# Patient Record
Sex: MALE | Race: BLACK OR AFRICAN AMERICAN | NOT HISPANIC OR LATINO
[De-identification: names, ages, dates, MRNs, and addresses within clinical notes are randomized per-mention and may not be internally consistent; named-entity substitution may affect disease eponyms.]

---

## 2021-12-15 ENCOUNTER — LABORATORY RESULT (OUTPATIENT)
Age: 71
End: 2021-12-15

## 2021-12-16 ENCOUNTER — MED ADMIN CHARGE (OUTPATIENT)
Age: 71
End: 2021-12-16

## 2021-12-16 ENCOUNTER — NON-APPOINTMENT (OUTPATIENT)
Age: 71
End: 2021-12-16

## 2021-12-16 ENCOUNTER — OUTPATIENT (OUTPATIENT)
Dept: OUTPATIENT SERVICES | Facility: HOSPITAL | Age: 71
LOS: 1 days | End: 2021-12-16
Payer: MEDICARE

## 2021-12-16 ENCOUNTER — APPOINTMENT (OUTPATIENT)
Dept: INTERNAL MEDICINE | Facility: CLINIC | Age: 71
End: 2021-12-16

## 2021-12-16 VITALS
HEIGHT: 71 IN | WEIGHT: 160 LBS | BODY MASS INDEX: 22.4 KG/M2 | OXYGEN SATURATION: 99 % | DIASTOLIC BLOOD PRESSURE: 64 MMHG | HEART RATE: 90 BPM | SYSTOLIC BLOOD PRESSURE: 128 MMHG

## 2021-12-16 DIAGNOSIS — I10 ESSENTIAL (PRIMARY) HYPERTENSION: ICD-10-CM

## 2021-12-16 DIAGNOSIS — R20.2 PARESTHESIA OF SKIN: ICD-10-CM

## 2021-12-16 DIAGNOSIS — Z23 ENCOUNTER FOR IMMUNIZATION: ICD-10-CM

## 2021-12-16 DIAGNOSIS — E11.9 TYPE 2 DIABETES MELLITUS WITHOUT COMPLICATIONS: ICD-10-CM

## 2021-12-16 PROCEDURE — 86617 LYME DISEASE ANTIBODY: CPT

## 2021-12-16 PROCEDURE — 84443 ASSAY THYROID STIM HORMONE: CPT

## 2021-12-16 PROCEDURE — 82746 ASSAY OF FOLIC ACID SERUM: CPT

## 2021-12-16 PROCEDURE — 87389 HIV-1 AG W/HIV-1&-2 AB AG IA: CPT

## 2021-12-16 PROCEDURE — 80061 LIPID PANEL: CPT

## 2021-12-16 PROCEDURE — 86618 LYME DISEASE ANTIBODY: CPT

## 2021-12-16 PROCEDURE — 85027 COMPLETE CBC AUTOMATED: CPT

## 2021-12-16 PROCEDURE — 86780 TREPONEMA PALLIDUM: CPT

## 2021-12-16 PROCEDURE — G0438: CPT | Mod: 25

## 2021-12-16 PROCEDURE — 83036 HEMOGLOBIN GLYCOSYLATED A1C: CPT

## 2021-12-16 PROCEDURE — 90715 TDAP VACCINE 7 YRS/> IM: CPT

## 2021-12-16 PROCEDURE — 90471 IMMUNIZATION ADMIN: CPT

## 2021-12-16 PROCEDURE — 82607 VITAMIN B-12: CPT

## 2021-12-16 PROCEDURE — 80053 COMPREHEN METABOLIC PANEL: CPT

## 2021-12-17 LAB
A1C WITH ESTIMATED AVERAGE GLUCOSE RESULT: 6.7 % — HIGH (ref 4–5.6)
ALBUMIN SERPL ELPH-MCNC: 4.5 G/DL — SIGNIFICANT CHANGE UP (ref 3.3–5)
ALP SERPL-CCNC: 78 U/L — SIGNIFICANT CHANGE UP (ref 40–120)
ALT FLD-CCNC: 12 U/L — SIGNIFICANT CHANGE UP (ref 10–45)
ANION GAP SERPL CALC-SCNC: 15 MMOL/L — SIGNIFICANT CHANGE UP (ref 5–17)
AST SERPL-CCNC: 15 U/L — SIGNIFICANT CHANGE UP (ref 10–40)
B BURGDOR C6 AB SER-ACNC: POSITIVE
B BURGDOR IGG+IGM SER-ACNC: 2.97 INDEX — HIGH (ref 0.01–0.89)
BILIRUB SERPL-MCNC: 1 MG/DL — SIGNIFICANT CHANGE UP (ref 0.2–1.2)
BUN SERPL-MCNC: 13 MG/DL — SIGNIFICANT CHANGE UP (ref 7–23)
CALCIUM SERPL-MCNC: 9.3 MG/DL — SIGNIFICANT CHANGE UP (ref 8.4–10.5)
CHLORIDE SERPL-SCNC: 101 MMOL/L — SIGNIFICANT CHANGE UP (ref 96–108)
CHOLEST SERPL-MCNC: 91 MG/DL — SIGNIFICANT CHANGE UP
CO2 SERPL-SCNC: 23 MMOL/L — SIGNIFICANT CHANGE UP (ref 22–31)
CREAT SERPL-MCNC: 0.88 MG/DL — SIGNIFICANT CHANGE UP (ref 0.5–1.3)
ESTIMATED AVERAGE GLUCOSE: 146 MG/DL — HIGH (ref 68–114)
FOLATE SERPL-MCNC: 3.3 NG/ML — LOW
GLUCOSE SERPL-MCNC: 101 MG/DL — HIGH (ref 70–99)
HCT VFR BLD CALC: 26.7 % — LOW (ref 39–50)
HDLC SERPL-MCNC: 39 MG/DL — LOW
HGB BLD-MCNC: 8 G/DL — LOW (ref 13–17)
HIV 1+2 AB+HIV1 P24 AG SERPL QL IA: SIGNIFICANT CHANGE UP
LIPID PNL WITH DIRECT LDL SERPL: 43 MG/DL — SIGNIFICANT CHANGE UP
LYME IGG AB: 0.13 INDEX — SIGNIFICANT CHANGE UP (ref 0.01–0.89)
LYME IGG INTERP: NEGATIVE — SIGNIFICANT CHANGE UP
LYME IGM AB: 3.59 INDEX — HIGH (ref 0.01–0.89)
LYME IGM INTERP: POSITIVE
MCHC RBC-ENTMCNC: 25.6 PG — LOW (ref 27–34)
MCHC RBC-ENTMCNC: 30 GM/DL — LOW (ref 32–36)
MCV RBC AUTO: 85.6 FL — SIGNIFICANT CHANGE UP (ref 80–100)
NON HDL CHOLESTEROL: 52 MG/DL — SIGNIFICANT CHANGE UP
NRBC # BLD: 7 /100 WBCS — HIGH (ref 0–0)
PLATELET # BLD AUTO: 468 K/UL — HIGH (ref 150–400)
POTASSIUM SERPL-MCNC: 5.4 MMOL/L — HIGH (ref 3.5–5.3)
POTASSIUM SERPL-SCNC: 5.4 MMOL/L — HIGH (ref 3.5–5.3)
PROT SERPL-MCNC: 7.6 G/DL — SIGNIFICANT CHANGE UP (ref 6–8.3)
RBC # BLD: 3.12 M/UL — LOW (ref 4.2–5.8)
RBC # FLD: 33.3 % — HIGH (ref 10.3–14.5)
SODIUM SERPL-SCNC: 139 MMOL/L — SIGNIFICANT CHANGE UP (ref 135–145)
T4 FREE+ TSH PNL SERPL: 1.19 UIU/ML — SIGNIFICANT CHANGE UP (ref 0.27–4.2)
TRIGL SERPL-MCNC: 43 MG/DL — SIGNIFICANT CHANGE UP
VIT B12 SERPL-MCNC: 1229 PG/ML — SIGNIFICANT CHANGE UP (ref 232–1245)
WBC # BLD: 5.7 K/UL — SIGNIFICANT CHANGE UP (ref 3.8–10.5)
WBC # FLD AUTO: 5.7 K/UL — SIGNIFICANT CHANGE UP (ref 3.8–10.5)

## 2021-12-21 LAB — T PALLIDUM AB TITR SER: NEGATIVE — SIGNIFICANT CHANGE UP

## 2021-12-31 NOTE — HISTORY OF PRESENT ILLNESS
[FreeTextEntry1] : CPE/re-establish care [de-identified] : 71M w/ no significant PMH, presenting for CPE/re-establish care after being lost to follow up after 2016. Pt notes he has not changed his PCP but merely did not follow-up, partly due to the COVID-19 pandemic. Today, pt reports that his main concern is that he is "colonized with flies on his head." States it started in Sep 2020 without an inciting event. He reports the flies crawl on his head and "attack it," but he cannot see them because they are "too small" and they also "lay eggs" and "settle in the head." Notes they only come out when it is warm like over 70 degrees F, or depending on what he puts in his mouth. Happens with certain foods, mouthwash, toothpaste. States they come out of his scalp as well as sides of head and can travel down to his legs. Reports that he has tried "everything" to get rid of these flies, including creams, bug sprays, and shaving his head every few weeks. Denies any itching. Denies it is lice or bed bugs. Denies infestation related to his place of residence. Lives in an apartment by himself in a building for senior residents. States he brought the flies with him to [facility] when he traveled there in Sep 2021 (returned in Nov 2021). Pt saw a doctor in [facility] who reportedly provoked his anger because the doctor told him the issue was with his mind, which pt does not believe. Denies f/c/n/v, CP, SOB, cough, abdominal pain, dysuria, hematuria, hematochezia, melena, headache, weakness, numbness, unsteady gait, skin rashes, or sick contacts. Notes L-eye nearsightedness and R-eye farsightedness that is corrected with "special glasses" from his eye doctor.

## 2021-12-31 NOTE — ASSESSMENT
[FreeTextEntry1] : 71M w/ no significant PMH, presenting for CPE/re-establish care after being lost to follow up after 2016, now with concerns that he is "colonized with flies on his head" since Sep 2020.\par \par #Formication\par - pt reports feelings of flies crawling and attacking his skin, mainly colonizing his head for >1 yr\par - unclear underlying etiology, possibly 2/2 metabolic vs psychiatric/neurologic\par - unremarkable physical exam, no signs of Parkinson's or Alzheimer's, no signs suggestive of lice, beg bugs, or other bug bites\par - recent travel to [facility], but symptoms started prior\par - f/u labs to workup for reversible causes: CBC, CMP, TSH, A1c, lipid panel, B12, folate, HIV, Syphilis screen, Lyme serologies\par \par #HCM\par - Depression screening: PHQ-2 score of 0 today\par - Immunizations: flu shot and Tdap today (last Tdap in Jul 2011); already vaccinated for COVID-19 x3 doses\par - Colon cancer screening: last colonoscopy in 3/24/10 showed one 4mm polyp in sigmoid colon but poor prep; due for repeat colonoscopy but pt would like to figure out his problem with the flies first\par \par RTC in 5 weeks for follow-up of symptoms\par d/w Dr. Merlos\par

## 2021-12-31 NOTE — PHYSICAL EXAM
[Normal Sclera/Conjunctiva] : normal sclera/conjunctiva [PERRL] : pupils equal round and reactive to light [EOMI] : extraocular movements intact [No Lymphadenopathy] : no lymphadenopathy [Supple] : supple [Thyroid Normal, No Nodules] : the thyroid was normal and there were no nodules present [Pedal Pulses Present] : the pedal pulses are present [No Edema] : there was no peripheral edema [No Extremity Clubbing/Cyanosis] : no extremity clubbing/cyanosis [Soft] : abdomen soft [Non Tender] : non-tender [Non-distended] : non-distended [Normal Bowel Sounds] : normal bowel sounds [Normal] : no rash [No Focal Deficits] : no focal deficits [Normal Gait] : normal gait [de-identified] : no resting tremor, no bradykinesia [de-identified] : +formication, fixated on being colonized with flies in his head that are too small to be seen

## 2021-12-31 NOTE — HEALTH RISK ASSESSMENT
[Never] : Never [0-4] : 0-4 [No] : No [1 or 2 (0 pts)] : 1 or 2 (0 points) [Never (0 pts)] : Never (0 points) [No falls in past year] : Patient reported no falls in the past year [0] : 2) Feeling down, depressed, or hopeless: Not at all (0) [PHQ-2 Negative - No further assessment needed] : PHQ-2 Negative - No further assessment needed [Alone] : lives alone [Fully functional (bathing, dressing, toileting, transferring, walking, feeding)] : Fully functional (bathing, dressing, toileting, transferring, walking, feeding) [Fully functional (using the telephone, shopping, preparing meals, housekeeping, doing laundry, using] : Fully functional and needs no help or supervision to perform IADLs (using the telephone, shopping, preparing meals, housekeeping, doing laundry, using transportation, managing medications and managing finances) [FreeTextEntry1] : "colonized with flies in the head" [de-identified] : walks 1-2 hours/day [Audit-CScore] : 0 [de-identified] : brown rice, stew, brown bread, fruits especially cantaloupe and honeydew, vegetables, a lot of fish almost everyday [[id number]] : 0 [Reports changes in hearing] : Reports no changes in hearing [ColonoscopyDate] : 03/10 [ColonoscopyComments] : poor prep; one 4mm polyp in sigmoid colon [de-identified] : in senior living residence [de-identified] : vision issues as noted in HPI

## 2021-12-31 NOTE — END OF VISIT
[] : Resident [FreeTextEntry3] : C/O sensation of bugs crawling on his head with normal PE.  Will r/o organic causes but need to consider a psychiatric cause given overall demeanor.

## 2022-01-13 ENCOUNTER — APPOINTMENT (OUTPATIENT)
Dept: INFECTIOUS DISEASE | Facility: CLINIC | Age: 72
End: 2022-01-13
Payer: MEDICARE

## 2022-01-13 VITALS
OXYGEN SATURATION: 99 % | HEIGHT: 71 IN | HEART RATE: 75 BPM | SYSTOLIC BLOOD PRESSURE: 144 MMHG | WEIGHT: 160 LBS | DIASTOLIC BLOOD PRESSURE: 70 MMHG | BODY MASS INDEX: 22.4 KG/M2 | TEMPERATURE: 97.9 F

## 2022-01-13 PROCEDURE — 99202 OFFICE O/P NEW SF 15 MIN: CPT

## 2022-01-14 NOTE — ASSESSMENT
[FreeTextEntry1] : 72 y/o male comes for visit for positive Lyme IgM.\par \par No specific lyme symptoms and no significant risk factors suggestive of Lyme.\par \par No obvious infection of his scalp area.\par \par Hold off abx.\par \par Will check lyme western blot.

## 2022-01-14 NOTE — PHYSICAL EXAM
[General Appearance - Alert] : alert [General Appearance - In No Acute Distress] : in no acute distress [Sclera] : the sclera and conjunctiva were normal [PERRL With Normal Accommodation] : pupils were equal in size, round, reactive to light [Extraocular Movements] : extraocular movements were intact [Outer Ear] : the ears and nose were normal in appearance [Oropharynx] : the oropharynx was normal with no thrush [Neck Appearance] : the appearance of the neck was normal [Neck Cervical Mass (___cm)] : no neck mass was observed [Jugular Venous Distention Increased] : there was no jugular-venous distention [Thyroid Diffuse Enlargement] : the thyroid was not enlarged [Auscultation Breath Sounds / Voice Sounds] : lungs were clear to auscultation bilaterally [Heart Sounds] : normal S1 and S2 [Full Pulse] : the pedal pulses are present [Edema] : there was no peripheral edema [Bowel Sounds] : normal bowel sounds [Abdomen Soft] : soft [Abdomen Tenderness] : non-tender [Abdomen Mass (___ Cm)] : no abdominal mass palpated [Costovertebral Angle Tenderness] : no CVA tenderness [Musculoskeletal - Swelling] : no joint swelling [Skin Color & Pigmentation] : normal skin color and pigmentation [] : no rash [No Focal Deficits] : no focal deficits [Oriented To Time, Place, And Person] : oriented to person, place, and time

## 2022-01-14 NOTE — HISTORY OF PRESENT ILLNESS
[FreeTextEntry1] : 72 y/o male comes for positive lyme serology\par \par His primary complaint is feeling bugs inside his head. No fever or bug bites. Has no pets.\par \par No travel. Walks in the park for exercise. No rash or hunting/coming or trips to [facility] or [facility]. \par \par His workup showed a positive Lyme IgM. Not on abx.\par \par No Cp, sob, nausea, vomiting, diarrhea, abd pain, sore throat. No eye symptoms. No dysuria or hematuria. \par

## 2022-01-20 ENCOUNTER — NON-APPOINTMENT (OUTPATIENT)
Age: 72
End: 2022-01-20

## 2022-01-21 ENCOUNTER — LABORATORY RESULT (OUTPATIENT)
Age: 72
End: 2022-01-21

## 2022-01-21 ENCOUNTER — OUTPATIENT (OUTPATIENT)
Dept: OUTPATIENT SERVICES | Facility: HOSPITAL | Age: 72
LOS: 1 days | End: 2022-01-21
Payer: MEDICARE

## 2022-01-21 ENCOUNTER — APPOINTMENT (OUTPATIENT)
Dept: INTERNAL MEDICINE | Facility: CLINIC | Age: 72
End: 2022-01-21
Payer: COMMERCIAL

## 2022-01-21 VITALS
SYSTOLIC BLOOD PRESSURE: 118 MMHG | BODY MASS INDEX: 22.68 KG/M2 | OXYGEN SATURATION: 98 % | DIASTOLIC BLOOD PRESSURE: 60 MMHG | WEIGHT: 162 LBS | HEART RATE: 71 BPM | HEIGHT: 71 IN

## 2022-01-21 DIAGNOSIS — I10 ESSENTIAL (PRIMARY) HYPERTENSION: ICD-10-CM

## 2022-01-21 DIAGNOSIS — R76.8 OTHER SPECIFIED ABNORMAL IMMUNOLOGICAL FINDINGS IN SERUM: ICD-10-CM

## 2022-01-21 DIAGNOSIS — A69.20 LYME DISEASE, UNSPECIFIED: ICD-10-CM

## 2022-01-21 PROCEDURE — ZZZZZ: CPT

## 2022-01-22 ENCOUNTER — LABORATORY RESULT (OUTPATIENT)
Age: 72
End: 2022-01-22

## 2022-01-22 LAB
ANION GAP SERPL CALC-SCNC: 10 MMOL/L — SIGNIFICANT CHANGE UP (ref 5–17)
BILIRUB SERPL-MCNC: 1 MG/DL — SIGNIFICANT CHANGE UP (ref 0.2–1.2)
BUN SERPL-MCNC: 14 MG/DL — SIGNIFICANT CHANGE UP (ref 7–23)
CALCIUM SERPL-MCNC: 9.1 MG/DL — SIGNIFICANT CHANGE UP (ref 8.4–10.5)
CHLORIDE SERPL-SCNC: 102 MMOL/L — SIGNIFICANT CHANGE UP (ref 96–108)
CO2 SERPL-SCNC: 26 MMOL/L — SIGNIFICANT CHANGE UP (ref 22–31)
CREAT SERPL-MCNC: 0.97 MG/DL — SIGNIFICANT CHANGE UP (ref 0.5–1.3)
FERRITIN SERPL-MCNC: 485 NG/ML — HIGH (ref 30–400)
FOLATE SERPL-MCNC: >20 NG/ML — SIGNIFICANT CHANGE UP
GLUCOSE SERPL-MCNC: 97 MG/DL — SIGNIFICANT CHANGE UP (ref 70–99)
HAPTOGLOB SERPL-MCNC: 74 MG/DL — SIGNIFICANT CHANGE UP (ref 34–200)
HCT VFR BLD CALC: 28.5 % — LOW (ref 39–50)
HGB BLD-MCNC: 8.6 G/DL — LOW (ref 13–17)
IRON SATN MFR SERPL: 32 % — SIGNIFICANT CHANGE UP (ref 16–55)
IRON SATN MFR SERPL: 71 UG/DL — SIGNIFICANT CHANGE UP (ref 45–165)
LDH SERPL L TO P-CCNC: 776 U/L — HIGH (ref 50–242)
MCHC RBC-ENTMCNC: 25.4 PG — LOW (ref 27–34)
MCHC RBC-ENTMCNC: 30.2 GM/DL — LOW (ref 32–36)
MCV RBC AUTO: 84.1 FL — SIGNIFICANT CHANGE UP (ref 80–100)
NRBC # BLD: 6 /100 WBCS — HIGH (ref 0–0)
PLATELET # BLD AUTO: 506 K/UL — HIGH (ref 150–400)
POTASSIUM SERPL-MCNC: 5.7 MMOL/L — HIGH (ref 3.5–5.3)
POTASSIUM SERPL-SCNC: 5.7 MMOL/L — HIGH (ref 3.5–5.3)
RBC # BLD: 3.39 M/UL — LOW (ref 4.2–5.8)
RBC # BLD: 3.39 M/UL — LOW (ref 4.2–5.8)
RBC # FLD: 31.8 % — HIGH (ref 10.3–14.5)
RETICS #: 41.7 K/UL — SIGNIFICANT CHANGE UP (ref 25–125)
RETICS/RBC NFR: 1.3 % — SIGNIFICANT CHANGE UP (ref 0.5–2.5)
SODIUM SERPL-SCNC: 139 MMOL/L — SIGNIFICANT CHANGE UP (ref 135–145)
TIBC SERPL-MCNC: 225 UG/DL — SIGNIFICANT CHANGE UP (ref 220–430)
TRANSFERRIN SERPL-MCNC: 173 MG/DL — LOW (ref 200–360)
UIBC SERPL-MCNC: 154 UG/DL — SIGNIFICANT CHANGE UP (ref 110–370)
WBC # BLD: 6.1 K/UL — SIGNIFICANT CHANGE UP (ref 3.8–10.5)
WBC # FLD AUTO: 6.1 K/UL — SIGNIFICANT CHANGE UP (ref 3.8–10.5)

## 2022-01-22 PROCEDURE — 80048 BASIC METABOLIC PNL TOTAL CA: CPT

## 2022-01-22 PROCEDURE — 82746 ASSAY OF FOLIC ACID SERUM: CPT

## 2022-01-22 PROCEDURE — 84466 ASSAY OF TRANSFERRIN: CPT

## 2022-01-22 PROCEDURE — 83010 ASSAY OF HAPTOGLOBIN QUANT: CPT

## 2022-01-22 PROCEDURE — 85027 COMPLETE CBC AUTOMATED: CPT

## 2022-01-22 PROCEDURE — 86880 COOMBS TEST DIRECT: CPT

## 2022-01-22 PROCEDURE — 83540 ASSAY OF IRON: CPT

## 2022-01-22 PROCEDURE — 82728 ASSAY OF FERRITIN: CPT

## 2022-01-22 PROCEDURE — 85045 AUTOMATED RETICULOCYTE COUNT: CPT

## 2022-01-22 PROCEDURE — 83550 IRON BINDING TEST: CPT

## 2022-01-22 PROCEDURE — G0463: CPT

## 2022-01-22 PROCEDURE — 82247 BILIRUBIN TOTAL: CPT

## 2022-01-22 PROCEDURE — 83615 LACTATE (LD) (LDH) ENZYME: CPT

## 2022-01-24 PROBLEM — R76.8 POSITIVE LYME DISEASE SEROLOGY: Status: ACTIVE | Noted: 2022-01-13

## 2022-01-24 PROBLEM — A69.20 LYME DISEASE: Noted: 2021-12-28

## 2022-01-24 LAB
B BURGDOR AB SER-IMP: NEGATIVE
B BURGDOR IGM PATRN SER IB-IMP: POSITIVE
B BURGDOR18KD IGG SER QL IB: PRESENT
B BURGDOR23KD IGG SER QL IB: NORMAL
B BURGDOR23KD IGM SER QL IB: NORMAL
B BURGDOR28KD IGG SER QL IB: NORMAL
B BURGDOR30KD IGG SER QL IB: NORMAL
B BURGDOR31KD IGG SER QL IB: NORMAL
B BURGDOR39KD IGG SER QL IB: NORMAL
B BURGDOR39KD IGM SER QL IB: PRESENT
B BURGDOR41KD IGG SER QL IB: PRESENT
B BURGDOR41KD IGM SER QL IB: PRESENT
B BURGDOR45KD IGG SER QL IB: NORMAL
B BURGDOR58KD IGG SER QL IB: NORMAL
B BURGDOR66KD IGG SER QL IB: NORMAL
B BURGDOR93KD IGG SER QL IB: NORMAL

## 2022-01-24 NOTE — PHYSICAL EXAM
[Normal TMs] : both tympanic membranes were normal [Normal] : normal rate, regular rhythm, normal S1 and S2 and no murmur heard [Pedal Pulses Present] : the pedal pulses are present [No Edema] : there was no peripheral edema [No Extremity Clubbing/Cyanosis] : no extremity clubbing/cyanosis [Soft] : abdomen soft [Non Tender] : non-tender [Non-distended] : non-distended [Normal Bowel Sounds] : normal bowel sounds [No Rash] : no rash [No Focal Deficits] : no focal deficits [Normal Gait] : normal gait [de-identified] : no resting tremor, no bradykinesia [de-identified] : +formication, fixated on being colonized with flies in his head that are too small to be seen

## 2022-01-24 NOTE — PHYSICAL EXAM
[Normal TMs] : both tympanic membranes were normal [Normal] : normal rate, regular rhythm, normal S1 and S2 and no murmur heard [Pedal Pulses Present] : the pedal pulses are present [No Edema] : there was no peripheral edema [No Extremity Clubbing/Cyanosis] : no extremity clubbing/cyanosis [Soft] : abdomen soft [Non Tender] : non-tender [Non-distended] : non-distended [Normal Bowel Sounds] : normal bowel sounds [No Rash] : no rash [No Focal Deficits] : no focal deficits [Normal Gait] : normal gait [de-identified] : no resting tremor, no bradykinesia [de-identified] : +formication, fixated on being colonized with flies in his head that are too small to be seen

## 2022-01-24 NOTE — REVIEW OF SYSTEMS
[Fever] : no fever [Chills] : no chills [Recent Change In Weight] : ~T no recent weight change [Vision Problems] : no vision problems [Hearing Loss] : no hearing loss [Sore Throat] : no sore throat [Chest Pain] : no chest pain [Palpitations] : no palpitations [Lower Ext Edema] : no lower extremity edema [Shortness Of Breath] : no shortness of breath [Wheezing] : no wheezing [Cough] : no cough [Abdominal Pain] : no abdominal pain [Nausea] : no nausea [Constipation] : no constipation [Diarrhea] : no diarrhea [Vomiting] : no vomiting [Melena] : no melena [Dysuria] : no dysuria [Hematuria] : no hematuria [Joint Pain] : no joint pain [Back Pain] : no back pain [Itching] : no itching [Skin Rash] : no skin rash [Headache] : no headache [Dizziness] : no dizziness [de-identified] : + [de-identified] : +"bugs on skin" sensation (see HPI)

## 2022-01-24 NOTE — ASSESSMENT
[FreeTextEntry1] : 71M w/ previously no significant PMH, but recently diagnosed with DM2, anemia, folate deficiency, positive Lyme IgM serology, presenting for follow-up of his formication symptoms since Sep 2021 (previously explained it started Sep 2020 but today states it started Sep 2021). \par \par #Formication\par - pt reports symptoms of being "colonized with flies in his head" are unchanged despite taking daily folate supplements as directed\par - unclear underlying etiology, possibly 2/2 folate deficiency vs other metabolic etiology vs psychiatric/neurologic vs structural vs malignancy vs unlikely Lyme disease\par - physical exam continues to be unremarkable, no clear signs of Parkinson's or Alzheimer's, no signs suggestive of lice, beg bugs, or other bug bites\par - offered to speak to pt's daughter for collateral information, but pt seemed unwilling and stated she has not been to his apartment since it started\par - pt was previously resistant to idea of primary psych issue, but today was more open to starting psych meds if it turns out to be the case\par - previous workup found negative HIV, TSH, B12, syphilis screen, though positive lyme IgM serology (see below)\par - will obtain further labs (namely repeat folate) today and obtain MRI head if unrevealing\par \par #Positive Lyme IgM serology\par - recently seen by ID (Dr. Kayden Pagan), obtained Lyme western blot which showed IgM positive but IgG negative (despite reports of symptoms for several months)\par - discussed case with Dr. Pagan today shortly, unlikely to be active Lyme infection given no specific symptoms and no significant risk factors\par - suspect results are false positives\par \par #Anemia\par - Last CBC on 12/16/21 found Hgb 8.0, which is significantly lowered than previous baseline of Hgb 11-12 in ~2016\par - MCV in normal range despite folate deficiency, presumably due to mixed macrocytic/microcytic picture with possible iron deficiency\par - remains asymptomatic; denies weight loss, lack of appetite, fatigue, [place], and any bleeding\par - concern for occult blood loss 2/2 possible colon cancer (had 4mm sigmoid colon polyp in the past)\par - will pursue stool FIT test at this time, but also advised to schedule earliest appointment for colonoscopy with GI (referral given and tasked GI fellow to see if able to expedite)\par - f/u repeat CBC, iron panel, hemolysis labs\par \par #Folate deficiency \par - folate level 3.3 on 12/16/21\par - c/w supplemental folic acid 1mg daily\par - f/u repeat folate level today\par \par #DM, recent dx\par - A1c 6.7% on 12/16/21\par - advised to continue lifestyle modifications including improving diet/exercise (pt reports he has increased vegetable intake and reduced carbs/sugary drinks)\par - A1c to be repeated in ~March 2022\par \par #Hyperkalemia, mild\par - Last CMP on 12/16/21 found K 5.4\par - advised to reduce intake of high potassium foods such as bananas\par - f/u repeat BMP\par \par #HCM\par - Depression screening: PHQ-2 score of 0 on 12/16/21\par - Immunizations: vaccinated for flu and Tdap on 12/16/21; already vaccinated for COVID-19 x3 doses (last in Dec 2021)\par - Colon cancer screening: last colonoscopy in 3/24/10 showed one 4mm polyp in sigmoid colon but poor prep; pt amenable to pursuing colonoscopy; GI referral given; FIT stool test also ordered in the meantime given unclear certainty of next available GI appointment\par \par RTC in 10 weeks for f/u of symptoms & DM management, or sooner as needed\par d/w Dr. Beckford\par

## 2022-01-24 NOTE — END OF VISIT
[] : Resident [FreeTextEntry3] : Folate deficiency (?etiology, B12 levels quite adequate) can result in neuropsychiatric disturbance, ?basis of the formication.  Has no EtOH hx, syphilis screen negative.  Has no other metabolic contributor found so far; does have a normal MCV anemia in the face of the folate deficiency, which is down trending.  Needs Fe indices, needs endoscopies upper and lower to work this up.  Not symptomatic enough for transfusion.  GI referral for asap scopes made.

## 2022-01-24 NOTE — HISTORY OF PRESENT ILLNESS
[FreeTextEntry1] : follow-up for formication [de-identified] : 71M w/ previously no significant PMH, but recently diagnosed with DM2, anemia, folate deficiency, positive Lyme IgM serology, presenting for follow-up of his formication symptoms since Sep 2021 (previously explained it started Sep 2020 but today states it started Sep 2021). \par \par #Formication\par - Pt continues to report being "colonized with flies on his head" and symptoms have not changed despite taking Folate supplements daily as directed. Continues to mainly be feelings of crawling, hitting, or stinging of his head, and at times moving to his legs. He still cannot see them. States no one else in his building deals with this issue, no one else has seen any flies or other bugs, and  that he hired couldn't find anything either. Last visit stated this has not happened before, but this time he notes that something similar (but still different) happened when he took some sort of medication in [facility] as a child to help study. Those symptoms stopped after taking the medication and he hasn't had anything similar until these symptoms. Continues to deny any smoking, drinking of EtOH, or use of any other drugs or medications aside from the prescribed folate. Last visit pt was resistant to the idea of a possible primary psych issue, but upon further discussion today, pt noted that he is open to the idea of psych issue, in which case he would be amenable to starting a medication since he just wants help with these symptoms.\par \par #Positive Lyme IgM serology\par - Saw ID (Dr. Kayden Pagan) since last visit for positive Lyme IgM serology. Obtained Lyme western blot which showed IgM positive but IgG negative. Discussed case with Dr. Pagan shortly prior to today's visit, unlikely to be active Lyme infection given no specific symptoms and no significant risk factors.\par \par #Anemia\par - Notes he has had a hx of anemia in the past but not as severe. Denies weight loss, lack of appetite, fatigue, or [place]. Denies f/c/n/v, CP, SOB, abdominal pain, dysuria, hematuria, hematochezia, melena, diarrhea, constipation. Of note, pt reports not eating red meat since ~1990. Still eats fish and chicken.\par \par #DM2\par - Since revealing pt he has DM, pt reports he has made changes to his diet to eat more vegetables and reduce intake of carbohydrates and sugary drinks.\par \par #HCM\par - initially, pt continued to stated he wanted to figure out this issue first prior to pursuing a colonoscopy. However, upon further discussion, pt was agreeable to schedule appointment with GI for colonoscopy. Pt also willing to do FIT stool test in the meantime given unclear certainty of next available GI appointment.\par

## 2022-01-24 NOTE — REVIEW OF SYSTEMS
[Fever] : no fever [Chills] : no chills [Recent Change In Weight] : ~T no recent weight change [Vision Problems] : no vision problems [Hearing Loss] : no hearing loss [Sore Throat] : no sore throat [Chest Pain] : no chest pain [Palpitations] : no palpitations [Lower Ext Edema] : no lower extremity edema [Shortness Of Breath] : no shortness of breath [Wheezing] : no wheezing [Cough] : no cough [Abdominal Pain] : no abdominal pain [Nausea] : no nausea [Constipation] : no constipation [Diarrhea] : no diarrhea [Vomiting] : no vomiting [Melena] : no melena [Dysuria] : no dysuria [Hematuria] : no hematuria [Joint Pain] : no joint pain [Back Pain] : no back pain [Itching] : no itching [Skin Rash] : no skin rash [Headache] : no headache [Dizziness] : no dizziness [de-identified] : + [de-identified] : +"bugs on skin" sensation (see HPI)

## 2022-01-24 NOTE — HISTORY OF PRESENT ILLNESS
[FreeTextEntry1] : follow-up for formication [de-identified] : 71M w/ previously no significant PMH, but recently diagnosed with DM2, anemia, folate deficiency, positive Lyme IgM serology, presenting for follow-up of his formication symptoms since Sep 2021 (previously explained it started Sep 2020 but today states it started Sep 2021). \par \par #Formication\par - Pt continues to report being "colonized with flies on his head" and symptoms have not changed despite taking Folate supplements daily as directed. Continues to mainly be feelings of crawling, hitting, or stinging of his head, and at times moving to his legs. He still cannot see them. States no one else in his building deals with this issue, no one else has seen any flies or other bugs, and  that he hired couldn't find anything either. Last visit stated this has not happened before, but this time he notes that something similar (but still different) happened when he took some sort of medication in [facility] as a child to help study. Those symptoms stopped after taking the medication and he hasn't had anything similar until these symptoms. Continues to deny any smoking, drinking of EtOH, or use of any other drugs or medications aside from the prescribed folate. Last visit pt was resistant to the idea of a possible primary psych issue, but upon further discussion today, pt noted that he is open to the idea of psych issue, in which case he would be amenable to starting a medication since he just wants help with these symptoms.\par \par #Positive Lyme IgM serology\par - Saw ID (Dr. Kayden Pagan) since last visit for positive Lyme IgM serology. Obtained Lyme western blot which showed IgM positive but IgG negative. Discussed case with Dr. Pagan shortly prior to today's visit, unlikely to be active Lyme infection given no specific symptoms and no significant risk factors.\par \par #Anemia\par - Notes he has had a hx of anemia in the past but not as severe. Denies weight loss, lack of appetite, fatigue, or [place]. Denies f/c/n/v, CP, SOB, abdominal pain, dysuria, hematuria, hematochezia, melena, diarrhea, constipation. Of note, pt reports not eating red meat since ~1990. Still eats fish and chicken.\par \par #DM2\par - Since revealing pt he has DM, pt reports he has made changes to his diet to eat more vegetables and reduce intake of carbohydrates and sugary drinks.\par \par #HCM\par - initially, pt continued to stated he wanted to figure out this issue first prior to pursuing a colonoscopy. However, upon further discussion, pt was agreeable to schedule appointment with GI for colonoscopy. Pt also willing to do FIT stool test in the meantime given unclear certainty of next available GI appointment.\par

## 2022-01-28 DIAGNOSIS — R20.2 PARESTHESIA OF SKIN: ICD-10-CM

## 2022-01-28 DIAGNOSIS — E11.9 TYPE 2 DIABETES MELLITUS WITHOUT COMPLICATIONS: ICD-10-CM

## 2022-01-28 DIAGNOSIS — E87.5 HYPERKALEMIA: ICD-10-CM

## 2022-01-28 DIAGNOSIS — R76.8 OTHER SPECIFIED ABNORMAL IMMUNOLOGICAL FINDINGS IN SERUM: ICD-10-CM

## 2022-01-28 DIAGNOSIS — R71.8 OTHER ABNORMALITY OF RED BLOOD CELLS: ICD-10-CM

## 2022-01-28 DIAGNOSIS — D64.9 ANEMIA, UNSPECIFIED: ICD-10-CM

## 2022-01-28 DIAGNOSIS — E53.8 DEFICIENCY OF OTHER SPECIFIED B GROUP VITAMINS: ICD-10-CM

## 2022-02-03 ENCOUNTER — OUTPATIENT (OUTPATIENT)
Dept: OUTPATIENT SERVICES | Facility: HOSPITAL | Age: 72
LOS: 1 days | Discharge: HOME | End: 2022-02-03

## 2022-02-03 DIAGNOSIS — D69.6 THROMBOCYTOPENIA, UNSPECIFIED: ICD-10-CM

## 2022-02-04 ENCOUNTER — RESULT REVIEW (OUTPATIENT)
Age: 72
End: 2022-02-04

## 2022-02-04 ENCOUNTER — APPOINTMENT (OUTPATIENT)
Dept: HEMATOLOGY ONCOLOGY | Facility: CLINIC | Age: 72
End: 2022-02-04
Payer: MEDICARE

## 2022-02-04 VITALS
WEIGHT: 165.35 LBS | OXYGEN SATURATION: 94 % | BODY MASS INDEX: 24.49 KG/M2 | HEIGHT: 69 IN | RESPIRATION RATE: 15 BRPM | TEMPERATURE: 97.9 F | DIASTOLIC BLOOD PRESSURE: 68 MMHG | SYSTOLIC BLOOD PRESSURE: 115 MMHG | HEART RATE: 76 BPM

## 2022-02-04 LAB
ANISOCYTOSIS BLD QL: SLIGHT — SIGNIFICANT CHANGE UP
BASOPHILS # BLD AUTO: 0 K/UL — SIGNIFICANT CHANGE UP (ref 0–0.2)
BASOPHILS NFR BLD AUTO: 0 % — SIGNIFICANT CHANGE UP (ref 0–2)
DACRYOCYTES BLD QL SMEAR: SLIGHT — SIGNIFICANT CHANGE UP
EOSINOPHIL # BLD AUTO: 0.05 K/UL — SIGNIFICANT CHANGE UP (ref 0–0.5)
EOSINOPHIL NFR BLD AUTO: 1 % — SIGNIFICANT CHANGE UP (ref 0–6)
HCT VFR BLD CALC: 28.2 % — LOW (ref 39–50)
HGB BLD-MCNC: 8.7 G/DL — LOW (ref 13–17)
LYMPHOCYTES # BLD AUTO: 0.66 K/UL — LOW (ref 1–3.3)
LYMPHOCYTES # BLD AUTO: 13 % — SIGNIFICANT CHANGE UP (ref 13–44)
MCHC RBC-ENTMCNC: 26.8 PG — LOW (ref 27–34)
MCHC RBC-ENTMCNC: 30.9 G/DL — LOW (ref 32–36)
MCV RBC AUTO: 86.8 FL — SIGNIFICANT CHANGE UP (ref 80–100)
METAMYELOCYTES # FLD: 3 % — HIGH (ref 0–0)
MICROCYTES BLD QL: SLIGHT — SIGNIFICANT CHANGE UP
MONOCYTES # BLD AUTO: 0.2 K/UL — SIGNIFICANT CHANGE UP (ref 0–0.9)
MONOCYTES NFR BLD AUTO: 4 % — SIGNIFICANT CHANGE UP (ref 2–14)
MYELOCYTES NFR BLD: 1 % — HIGH (ref 0–0)
NEUTROPHILS # BLD AUTO: 3.93 K/UL — SIGNIFICANT CHANGE UP (ref 1.8–7.4)
NEUTROPHILS NFR BLD AUTO: 78 % — HIGH (ref 43–77)
NRBC # BLD: 10 /100 — HIGH (ref 0–0)
NRBC # BLD: SIGNIFICANT CHANGE UP /100 WBCS (ref 0–0)
PLAT MORPH BLD: NORMAL — SIGNIFICANT CHANGE UP
PLATELET # BLD AUTO: 589 K/UL — HIGH (ref 150–400)
POIKILOCYTOSIS BLD QL AUTO: SLIGHT — SIGNIFICANT CHANGE UP
RBC # BLD: 3.25 M/UL — LOW (ref 4.2–5.8)
RBC # FLD: 31.4 % — HIGH (ref 10.3–14.5)
RBC BLD AUTO: ABNORMAL
RETICS #: 43.2 K/UL — SIGNIFICANT CHANGE UP (ref 25–125)
RETICS/RBC NFR: 1.3 % — SIGNIFICANT CHANGE UP (ref 0.5–2.5)
SCHISTOCYTES BLD QL AUTO: SLIGHT — SIGNIFICANT CHANGE UP
WBC # BLD: 5.04 K/UL — SIGNIFICANT CHANGE UP (ref 3.8–10.5)
WBC # FLD AUTO: 5.04 K/UL — SIGNIFICANT CHANGE UP (ref 3.8–10.5)

## 2022-02-04 PROCEDURE — 99205 OFFICE O/P NEW HI 60 MIN: CPT

## 2022-02-04 NOTE — CONSULT LETTER
[Dear  ___] : Dear  [unfilled], [Consult Letter:] : I had the pleasure of evaluating your patient, [unfilled]. [Please see my note below.] : Please see my note below. [Consult Closing:] : Thank you very much for allowing me to participate in the care of this patient.  If you have any questions, please do not hesitate to contact me. [Sincerely,] : Sincerely, [FreeTextEntry3] : Jose Mayers MD, PhD, MS \par Attending Physician \par Hematology-Oncology \par [facility]\par

## 2022-02-04 NOTE — ASSESSMENT
[FreeTextEntry1] : 71 year-old Mr. BAIN is seen in consult for anemia, thrombocytosis and rule out myelofibrosis. Patient is completely asymptomatic.His anemia is normocytic, he has had low folic acid that has normalized after folate supplementation. Noted to have mild high ferritin and moderately high LDH in the absence evidence of hemolysis. He does not have splenomegaly on physical exam. Intramedullary may have happened due to folate deficiency, however a primary bone marrow process cannot be ruled out. Will repeat some of the labs and plan on a bone marrow biopsy. Of note patient has some kind of hallucination/delusion and may benefit from a psych evaluation. \par \par # Normocytic Anemia (8.6 g) of unclear etiology \par - Patient is asymptomatic.\par - Repeat CBC, LDH, Folate\par - Send TSH, Copper, Zinc, EPO level \par - Cannot rule out prefibrotic PMF\par - Will consider a bone marrow biopsy after lab review \par - Continue Folic acid \par - RTC in 1 month\par  \par

## 2022-02-04 NOTE — HISTORY OF PRESENT ILLNESS
[de-identified] : 71 year-old Mr. BAIN is seen in consult for "anemia, thrombocytosis, r/o myelofibrosis". Patient states that since Sept 2021 he has been feeling like flies crawling inside his head when he is smelling food or eating; then the flies crawl to his legs. He went to his PCP for this symptom where after a lot of blood work he was found to be anemic, folate deficient. He has been on folic acid since mid January. Lab work also showed thrombocytosis (506). His Hgb was 8.6, MCV normal range, no  retic response, normal iron studies, high ferritin (485), normal  B12. Of note LDH was high at ~775, haptoglobin was normal range (~75). The patient denies any fatigue, weight loss, night sweats, fever. He has no complaints other than the "flies" which have long been abundant in his apartment and now they are crawling in his brain. Of note, patient has no other medical condition and he exercises regularly.

## 2022-02-04 NOTE — RESULTS/DATA
Anesthesia Transfer of Care Note    Patient: Naila Huerta    Procedure(s) Performed: Procedure(s) (LRB):  ARTHROSCOPY, KNEE (Left)  MENISCECTOMY, KNEE, MEDIAL AND LATERAL (Left)    Patient location: PACU    Anesthesia Type: general    Transport from OR: Transported from OR on room air with adequate spontaneous ventilation    Post pain: adequate analgesia    Post assessment: no apparent anesthetic complications    Post vital signs: stable    Level of consciousness: awake and alert    Nausea/Vomiting: no nausea/vomiting    Complications: none    Transfer of care protocol was followed      Last vitals:   Visit Vitals  /79 (BP Location: Right arm, Patient Position: Lying)   Pulse 67   Temp 36.5 °C (97.7 °F) (Oral)   Resp 16   Wt 65.8 kg (145 lb 1 oz)   SpO2 97%   BMI 22.72 kg/m²      [FreeTextEntry1] : 2/4/2022 \par Available labs reviewed, relevant data as below:\par Anemia, Hgb 8.6, normal MCV (84); no retic response,\par Normal iron studies, high ferritin (485), \par Low Folic acid  (3.3), normal  B12\par Thrombocytosis (506).  \par LDH high at ~750 \par Haptoglobin normal range.

## 2022-02-04 NOTE — REASON FOR VISIT
[Initial Consultation] : an initial consultation for [FreeTextEntry2] : Anemia, thrombocytosis, r/o myelofibrosis

## 2022-02-07 LAB
ALBUMIN SERPL ELPH-MCNC: 4.7 G/DL
ALP BLD-CCNC: 87 U/L
ALT SERPL-CCNC: 27 U/L
ANION GAP SERPL CALC-SCNC: 8 MMOL/L
AST SERPL-CCNC: 21 U/L
B19V IGG SER QL IA: 1.76 INDEX
B19V IGG+IGM SER-IMP: NORMAL
B19V IGG+IGM SER-IMP: POSITIVE
B19V IGM FLD-ACNC: 0.16 INDEX
B19V IGM SER-ACNC: NEGATIVE
BILIRUB SERPL-MCNC: 0.9 MG/DL
BUN SERPL-MCNC: 18 MG/DL
CALCIUM SERPL-MCNC: 8.9 MG/DL
CHLORIDE SERPL-SCNC: 106 MMOL/L
CO2 SERPL-SCNC: 26 MMOL/L
COPPER SERPL-MCNC: 121 UG/DL
CREAT SERPL-MCNC: 0.97 MG/DL
EPO SERPL-MCNC: 39 MIU/ML
FOLATE SERPL-MCNC: >20 NG/ML
GLUCOSE SERPL-MCNC: 189 MG/DL
LDH SERPL-CCNC: 739 U/L
POTASSIUM SERPL-SCNC: 4.8 MMOL/L
PROT SERPL-MCNC: 6.8 G/DL
SODIUM SERPL-SCNC: 140 MMOL/L
TSH SERPL-ACNC: 1.72 UIU/ML

## 2022-02-09 LAB
ANA PAT FLD IF-IMP: ABNORMAL
ANA SER IF-ACNC: ABNORMAL
ZINC SERPL-MCNC: 55 UG/DL

## 2022-02-25 ENCOUNTER — APPOINTMENT (OUTPATIENT)
Dept: RHEUMATOLOGY | Facility: CLINIC | Age: 72
End: 2022-02-25

## 2022-02-28 ENCOUNTER — APPOINTMENT (OUTPATIENT)
Dept: RHEUMATOLOGY | Facility: CLINIC | Age: 72
End: 2022-02-28
Payer: MEDICARE

## 2022-02-28 ENCOUNTER — LABORATORY RESULT (OUTPATIENT)
Age: 72
End: 2022-02-28

## 2022-02-28 VITALS
BODY MASS INDEX: 23.19 KG/M2 | HEART RATE: 88 BPM | HEIGHT: 70 IN | SYSTOLIC BLOOD PRESSURE: 137 MMHG | RESPIRATION RATE: 16 BRPM | TEMPERATURE: 97.2 F | OXYGEN SATURATION: 96 % | DIASTOLIC BLOOD PRESSURE: 73 MMHG | WEIGHT: 162 LBS

## 2022-02-28 DIAGNOSIS — R76.8 OTHER SPECIFIED ABNORMAL IMMUNOLOGICAL FINDINGS IN SERUM: ICD-10-CM

## 2022-02-28 PROCEDURE — 99204 OFFICE O/P NEW MOD 45 MIN: CPT

## 2022-03-04 LAB
ALBUMIN MFR SERPL ELPH: 53.8 %
ALBUMIN SERPL-MCNC: 3.8 G/DL
ALBUMIN/GLOB SERPL: 1.2 RATIO
ALPHA1 GLOB MFR SERPL ELPH: 5.1 %
ALPHA1 GLOB SERPL ELPH-MCNC: 0.4 G/DL
ALPHA2 GLOB MFR SERPL ELPH: 8.5 %
ALPHA2 GLOB SERPL ELPH-MCNC: 0.6 G/DL
ANA PAT FLD IF-IMP: ABNORMAL
ANA SER IF-ACNC: ABNORMAL
APPEARANCE: CLEAR
B-GLOBULIN MFR SERPL ELPH: 9.4 %
B-GLOBULIN SERPL ELPH-MCNC: 0.7 G/DL
B2 GLYCOPROT1 IGA SERPL IA-ACNC: 5.3 SAU
B2 GLYCOPROT1 IGG SER-ACNC: <5 SGU
B2 GLYCOPROT1 IGM SER-ACNC: 10.1 SMU
BILIRUBIN URINE: NEGATIVE
BLOOD URINE: NEGATIVE
C3 SERPL-MCNC: 83 MG/DL
C4 SERPL-MCNC: 28 MG/DL
CARDIOLIPIN IGM SER-MCNC: 44.6 MPL
CARDIOLIPIN IGM SER-MCNC: 7.4 GPL
CCP AB SER IA-ACNC: 15 UNITS
CENTROMERE IGG SER-ACNC: <0.2 CD:130001892
CK SERPL-CCNC: 68 U/L
COLOR: YELLOW
CONFIRM: 35.7 SEC
CREAT SPEC-SCNC: 102 MG/DL
CREAT/PROT UR: 0.2 RATIO
DEPRECATED CARDIOLIPIN IGA SER: <5 APL
DEPRECATED KAPPA LC FREE/LAMBDA SER: 1.08 RATIO
DRVVT IMM 1:2 NP PPP: NORMAL
DRVVT SCREEN TO CONFIRM RATIO: 0.92 RATIO
DSDNA AB SER-ACNC: <12 IU/ML
ENA RNP AB SER IA-ACNC: 0.2 AL
ENA SCL70 IGG SER IA-ACNC: <0.2 AL
ENA SM AB SER IA-ACNC: <0.2 AL
ENA SS-A AB SER IA-ACNC: <0.2 AL
ENA SS-B AB SER IA-ACNC: <0.2 AL
GAMMA GLOB FLD ELPH-MCNC: 1.6 G/DL
GAMMA GLOB MFR SERPL ELPH: 23.2 %
GLUCOSE QUALITATIVE U: NEGATIVE
IGA SER QL IEP: 122 MG/DL
IGG SER QL IEP: 1550 MG/DL
IGM SER QL IEP: 319 MG/DL
IL6 SERPL-MCNC: 5.8 PG/ML
INTERPRETATION SERPL IEP-IMP: NORMAL
KAPPA LC CSF-MCNC: 3.99 MG/DL
KAPPA LC SERPL-MCNC: 4.29 MG/DL
KETONES URINE: NEGATIVE
LEUKOCYTE ESTERASE URINE: NEGATIVE
M PROTEIN SPEC IFE-MCNC: NORMAL
MYELOPEROXIDASE AB SER QL IA: <5 UNITS
MYELOPEROXIDASE CELLS FLD QL: NEGATIVE
NITRITE URINE: NEGATIVE
PH URINE: 6
PROT SERPL-MCNC: 7 G/DL
PROT SERPL-MCNC: 7 G/DL
PROT UR-MCNC: 25 MG/DL
PROTEIN URINE: ABNORMAL
PROTEINASE3 AB SER IA-ACNC: 7 UNITS
PROTEINASE3 AB SER-ACNC: NEGATIVE
RF+CCP IGG SER-IMP: NEGATIVE
RHEUMATOID FACT SER QL: 14 IU/ML
SCREEN DRVVT: 44 SEC
SPECIFIC GRAVITY URINE: 1.02
THYROGLOB AB SERPL-ACNC: <20 IU/ML
THYROPEROXIDASE AB SERPL IA-ACNC: <10 IU/ML
UROBILINOGEN URINE: ABNORMAL

## 2022-03-08 LAB
IGG SUBSET TOTAL IGG: 1604 MG/DL
IGG1 SER-MCNC: 988 MG/DL
IGG2 SER-MCNC: 405 MG/DL
IGG3 SER-MCNC: >220 MG/DL
IGG4 SER-MCNC: 62 MG/DL

## 2022-03-17 ENCOUNTER — OUTPATIENT (OUTPATIENT)
Dept: OUTPATIENT SERVICES | Facility: HOSPITAL | Age: 72
LOS: 1 days | Discharge: HOME | End: 2022-03-17

## 2022-03-17 DIAGNOSIS — D69.6 THROMBOCYTOPENIA, UNSPECIFIED: ICD-10-CM

## 2022-03-21 ENCOUNTER — APPOINTMENT (OUTPATIENT)
Dept: HEMATOLOGY ONCOLOGY | Facility: CLINIC | Age: 72
End: 2022-03-21

## 2022-05-04 ENCOUNTER — OUTPATIENT (OUTPATIENT)
Dept: OUTPATIENT SERVICES | Facility: HOSPITAL | Age: 72
LOS: 1 days | End: 2022-05-04
Payer: MEDICARE

## 2022-05-04 ENCOUNTER — APPOINTMENT (OUTPATIENT)
Dept: INTERNAL MEDICINE | Facility: CLINIC | Age: 72
End: 2022-05-04
Payer: MEDICARE

## 2022-05-04 ENCOUNTER — LABORATORY RESULT (OUTPATIENT)
Age: 72
End: 2022-05-04

## 2022-05-04 VITALS
WEIGHT: 163 LBS | HEIGHT: 70 IN | BODY MASS INDEX: 23.34 KG/M2 | HEART RATE: 98 BPM | SYSTOLIC BLOOD PRESSURE: 126 MMHG | OXYGEN SATURATION: 98 % | DIASTOLIC BLOOD PRESSURE: 78 MMHG

## 2022-05-04 DIAGNOSIS — E53.8 DEFICIENCY OF OTHER SPECIFIED B GROUP VITAMINS: ICD-10-CM

## 2022-05-04 DIAGNOSIS — I10 ESSENTIAL (PRIMARY) HYPERTENSION: ICD-10-CM

## 2022-05-04 DIAGNOSIS — Z86.39 PERSONAL HISTORY OF OTHER ENDOCRINE, NUTRITIONAL AND METABOLIC DISEASE: ICD-10-CM

## 2022-05-04 PROCEDURE — G0463: CPT | Mod: 25

## 2022-05-04 PROCEDURE — 85025 COMPLETE CBC W/AUTO DIFF WBC: CPT

## 2022-05-04 PROCEDURE — G0009: CPT

## 2022-05-04 PROCEDURE — 90670 PCV13 VACCINE IM: CPT

## 2022-05-04 PROCEDURE — 99213 OFFICE O/P EST LOW 20 MIN: CPT | Mod: GE

## 2022-05-04 PROCEDURE — 83036 HEMOGLOBIN GLYCOSYLATED A1C: CPT

## 2022-05-06 ENCOUNTER — NON-APPOINTMENT (OUTPATIENT)
Age: 72
End: 2022-05-06

## 2022-05-06 LAB
BASOPHILS # BLD AUTO: 0.34 K/UL
BASOPHILS NFR BLD AUTO: 5.3 %
EOSINOPHIL # BLD AUTO: 0 K/UL
EOSINOPHIL NFR BLD AUTO: 0 %
HBA1C MFR BLD HPLC: 7.1
HCT VFR BLD CALC: 29.2 %
HGB BLD-MCNC: 8.7 G/DL
LYMPHOCYTES # BLD AUTO: 1.14 K/UL
LYMPHOCYTES NFR BLD AUTO: 17.5 %
MAN DIFF?: NORMAL
MCHC RBC-ENTMCNC: 26.2 PG
MCHC RBC-ENTMCNC: 29.8 GM/DL
MCV RBC AUTO: 88 FL
MONOCYTES # BLD AUTO: 0.17 K/UL
MONOCYTES NFR BLD AUTO: 2.6 %
NEUTROPHILS # BLD AUTO: 4.27 K/UL
NEUTROPHILS NFR BLD AUTO: 59.6 %
PLATELET # BLD AUTO: 425 K/UL
RBC # BLD: 3.32 M/UL
RBC # FLD: 30.8 %
WBC # FLD AUTO: 6.5 K/UL

## 2022-05-09 PROBLEM — E53.8 FOLATE DEFICIENCY: Status: ACTIVE | Noted: 2021-12-17

## 2022-05-09 PROBLEM — Z86.39 HISTORY OF HYPERKALEMIA: Status: RESOLVED | Noted: 2022-01-24 | Resolved: 2022-05-09

## 2022-05-09 NOTE — REVIEW OF SYSTEMS
[Fever] : no fever [Chills] : no chills [Recent Change In Weight] : ~T no recent weight change [Vision Problems] : no vision problems [Hearing Loss] : no hearing loss [Sore Throat] : no sore throat [Chest Pain] : no chest pain [Palpitations] : no palpitations [Lower Ext Edema] : no lower extremity edema [Shortness Of Breath] : no shortness of breath [Wheezing] : no wheezing [Cough] : no cough [Abdominal Pain] : no abdominal pain [Nausea] : no nausea [Constipation] : no constipation [Diarrhea] : no diarrhea [Vomiting] : no vomiting [Melena] : no melena [Dysuria] : no dysuria [Hematuria] : no hematuria [Joint Pain] : no joint pain [Back Pain] : no back pain [Itching] : no itching [Skin Rash] : no skin rash [Headache] : no headache [Dizziness] : no dizziness [de-identified] : +"bugs on skin" sensation (improved compared to prior)

## 2022-05-09 NOTE — HISTORY OF PRESENT ILLNESS
[FreeTextEntry1] : follow-up [de-identified] : 71M w/ hx of recently diagnosed DM2, normocytic anemia of unclear etiology, folate deficiency, presenting for follow-up of his formication symptoms since Sep 2021. Today, pt reports that although he is still having the same symptoms of "flies on his head," he is feeling better. States it is "10% of what it was" and also notes that he has been feeling more energy.\par \par #Formication\par - Still taking folic acid as directed. Since last visit, has been seen by Hematology and Rheumatology. Per Heme, cannot r/o prefibrotic primary myelofibrosis; will consider bone marrow biopsy. Per Rheum, doubt autoimmune condition and notes he will likely need brain imaging to address his concerns. Of note, pt states he will be flying to [facility] in 3 days and will be staying there for 3 months. Requesting larger supply of folic acid.\par \par #DM2\par - POCT A1c 7.1% today (was previously 6.7% in Dec 2021)\par - Reports exercising regularly and does not believe he has changed his diet much; notes he still has has brown rice, bread, and fruits, but has reduced the amount\par \par #HCM\par - already vaccinated for flu and Tdap in Dec 2021; also vaccinated for COVID-19 x3 doses (last in Dec 2021)\par - amenable to receiving [id number] vaccine today\par - GI referral previously given for colonoscopy but pt has not scheduled appointment; FIT test was negative in Jan 2022

## 2022-05-09 NOTE — ASSESSMENT
[FreeTextEntry1] : 71M w/ hx of recently diagnosed DM2, normocytic anemia of unclear etiology, folate deficiency, presenting for follow-up of his formication symptoms since Sep 2021. \par \par #Formication\par - reports symptoms of being "colonized with flies in his head" have significantly improved since last visit in Jan 2022; now only 10% of what it was\par - unclear underlying etiology, possibly 2/2 folate deficiency vs other metabolic etiology vs psychiatric/neurologic vs structural vs malignancy vs unlikely Lyme disease\par - physical exam continues to be unremarkable, no clear signs of Parkinson's or Alzheimer's, no signs suggestive of lice, beg bugs, or other bug bites\par - s/p extensive workup without convincing etiology thus far\par - will obtain CTH non con for now; may consider MRI head if needed\par - following with Heme and Rheum\par - c/w supplemental folic acid 1mg daily\par \par #Anemia, normocytic\par - possibly 2/2 primary heme etiology\par - less likely occult bleed given recent FIT test in Jan 2022 was negative\par - Last CBC in Feb 2022 was 8.7, appears to be stable but still significantly lower than previous baseline of Hgb 11-12 in ~2016\par - remains asymptomatic; denies weight loss, lack of appetite, fatigue, [place], and any bleeding\par - f/u Hematology, may consider BM biopsy\par - f/u repeat CBC today\par \par #Folate deficiency \par - folate level 3.3 in Dec 2021 but has since been normal x2 after folate supplementation\par - c/w supplemental folic acid 1mg daily\par \par #DM2\par - POCT A1c 7.1% today; last A1c 6.7% on 12/16/21\par - unclear accuracy of A1c given anemia\par - advised to continue lifestyle modifications for now including improving diet/exercise (pt reports he has increased vegetable intake and reduced carbs/sugary drinks)\par \par #HCM\par - Depression screening: PHQ-2 score of 0 today\par - Immunizations: received [id number] vaccine today; already vaccinated for flu and Tdap on 12/16/21; already vaccinated for COVID-19 x3 doses (last in Dec 2021)\par - Colon cancer screening: FIT stool test negative on 1/22/22; last colonoscopy in 3/24/10 showed one 4mm polyp in sigmoid colon but poor prep; GI referral was previously given as pt was amenable for repeat colonoscopy\par \par RTC in 3 months (or whenever pt returns after 3-month trip to [facility]) for f/u of symptoms and DM management\par d/w Dr. Hernandez\par \par

## 2022-05-09 NOTE — PHYSICAL EXAM
[Normal] : normal rate, regular rhythm, normal S1 and S2 and no murmur heard [Pedal Pulses Present] : the pedal pulses are present [No Edema] : there was no peripheral edema [No Extremity Clubbing/Cyanosis] : no extremity clubbing/cyanosis [Soft] : abdomen soft [Non Tender] : non-tender [Non-distended] : non-distended [Normal Bowel Sounds] : normal bowel sounds [No Rash] : no rash [No Focal Deficits] : no focal deficits [Normal Gait] : normal gait [de-identified] : no resting tremor, no bradykinesia [de-identified] : +formication (sensations have largely improved though still believes colonized with flies on head)

## 2022-05-10 DIAGNOSIS — D64.9 ANEMIA, UNSPECIFIED: ICD-10-CM

## 2022-05-10 DIAGNOSIS — E53.8 DEFICIENCY OF OTHER SPECIFIED B GROUP VITAMINS: ICD-10-CM

## 2022-05-10 DIAGNOSIS — Z23 ENCOUNTER FOR IMMUNIZATION: ICD-10-CM

## 2022-05-10 DIAGNOSIS — E11.9 TYPE 2 DIABETES MELLITUS WITHOUT COMPLICATIONS: ICD-10-CM

## 2022-05-10 DIAGNOSIS — R20.2 PARESTHESIA OF SKIN: ICD-10-CM

## 2022-08-24 ENCOUNTER — OUTPATIENT (OUTPATIENT)
Dept: OUTPATIENT SERVICES | Facility: HOSPITAL | Age: 72
LOS: 1 days | End: 2022-08-24
Payer: MEDICARE

## 2022-08-24 ENCOUNTER — APPOINTMENT (OUTPATIENT)
Dept: CT IMAGING | Facility: CLINIC | Age: 72
End: 2022-08-24

## 2022-08-24 DIAGNOSIS — R20.2 PARESTHESIA OF SKIN: ICD-10-CM

## 2022-08-24 PROCEDURE — 70450 CT HEAD/BRAIN W/O DYE: CPT | Mod: MG

## 2022-08-24 PROCEDURE — G1004: CPT

## 2022-08-24 PROCEDURE — 70450 CT HEAD/BRAIN W/O DYE: CPT | Mod: 26,MG

## 2023-07-03 ENCOUNTER — APPOINTMENT (OUTPATIENT)
Dept: INTERNAL MEDICINE | Facility: CLINIC | Age: 73
End: 2023-07-03

## 2023-08-21 ENCOUNTER — APPOINTMENT (OUTPATIENT)
Dept: INTERNAL MEDICINE | Facility: CLINIC | Age: 73
End: 2023-08-21
Payer: MEDICARE

## 2023-08-21 ENCOUNTER — OUTPATIENT (OUTPATIENT)
Dept: OUTPATIENT SERVICES | Facility: HOSPITAL | Age: 73
LOS: 1 days | End: 2023-08-21
Payer: MEDICARE

## 2023-08-21 VITALS
HEIGHT: 70 IN | OXYGEN SATURATION: 98 % | SYSTOLIC BLOOD PRESSURE: 140 MMHG | DIASTOLIC BLOOD PRESSURE: 70 MMHG | BODY MASS INDEX: 21.62 KG/M2 | HEART RATE: 85 BPM | WEIGHT: 151 LBS

## 2023-08-21 DIAGNOSIS — K63.5 POLYP OF COLON: ICD-10-CM

## 2023-08-21 DIAGNOSIS — E11.9 TYPE 2 DIABETES MELLITUS WITHOUT COMPLICATIONS: ICD-10-CM

## 2023-08-21 DIAGNOSIS — E11.9 TYPE 2 DIABETES MELLITUS W/OUT COMPLICATIONS: ICD-10-CM

## 2023-08-21 DIAGNOSIS — I10 ESSENTIAL (PRIMARY) HYPERTENSION: ICD-10-CM

## 2023-08-21 DIAGNOSIS — D64.9 ANEMIA, UNSPECIFIED: ICD-10-CM

## 2023-08-21 DIAGNOSIS — Z00.00 ENCOUNTER FOR GENERAL ADULT MEDICAL EXAMINATION WITHOUT ABNORMAL FINDINGS: ICD-10-CM

## 2023-08-21 DIAGNOSIS — A69.20 LYME DISEASE, UNSPECIFIED: ICD-10-CM

## 2023-08-21 DIAGNOSIS — Z00.00 ENCOUNTER FOR GENERAL ADULT MEDICAL EXAMINATION W/OUT ABNORMAL FINDINGS: ICD-10-CM

## 2023-08-21 LAB — HBA1C MFR BLD HPLC: 7

## 2023-08-21 PROCEDURE — G0463: CPT | Mod: 25

## 2023-08-21 PROCEDURE — 85025 COMPLETE CBC W/AUTO DIFF WBC: CPT

## 2023-08-21 PROCEDURE — 83036 HEMOGLOBIN GLYCOSYLATED A1C: CPT

## 2023-08-21 PROCEDURE — 83036 HEMOGLOBIN GLYCOSYLATED A1C: CPT | Mod: QW

## 2023-08-21 PROCEDURE — 99213 OFFICE O/P EST LOW 20 MIN: CPT | Mod: 25

## 2023-08-21 PROCEDURE — 82607 VITAMIN B-12: CPT

## 2023-08-21 NOTE — PHYSICAL EXAM
[No Acute Distress] : no acute distress [No Lymphadenopathy] : no lymphadenopathy [No Respiratory Distress] : no respiratory distress  [No Accessory Muscle Use] : no accessory muscle use [Clear to Auscultation] : lungs were clear to auscultation bilaterally [Normal Rate] : normal rate  [Regular Rhythm] : with a regular rhythm [Normal S1, S2] : normal S1 and S2 [Normal Supraclavicular Nodes] : no supraclavicular lymphadenopathy [Normal Posterior Cervical Nodes] : no posterior cervical lymphadenopathy [Normal Anterior Cervical Nodes] : no anterior cervical lymphadenopathy [Speech Grossly Normal] : speech grossly normal [Memory Grossly Normal] : memory grossly normal [Normal Affect] : the affect was normal

## 2023-08-22 PROBLEM — K63.5 COLON POLYP: Status: ACTIVE | Noted: 2023-08-21

## 2023-08-22 NOTE — END OF VISIT
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 will tx with  oral doxy x 14d risks discussed and pt would like ot proceed. fu neuro thereafter.  due for heme fu and colonoscopy

## 2023-08-22 NOTE — HISTORY OF PRESENT ILLNESS
[FreeTextEntry1] : Follow up [de-identified] : 73 M history DMT2, anemia, and chronic formication presenting for follow up regarding treatment for his formication (sensation of flies crawling on his scalp and legs), renewal of glaucoma medication from [facility], and whether he needs to continue taking folic acid. Patient explains that folic acid makes his stools hard, and he wants to know if taking folic acid is necessary.  He also states that he needs Timonidin eye drops renewed for his bilateral eye pressure. Patient says he obtained his prescription from an eye doctor in [facility]. It has been over a year since he last saw an eye doctor. Of note, patient says that although the sensation of flies crawling on his scalp has improved, it continues to negatively impact his life as it has been ongoing for nearly 3 years. He says that he does NOT see flies but feels it crawling on his scalp, and he touches it, then he feels similar formication but on his legs. Patient says that hot shower alleviates such sensation. Patient further recounted that in 1968, he had taken a drug/medication to that made him stay awake as schooling was very competitive in [facility]. He develops formication shortly thereafter. Then, a couple of decades later, after drinking [employer] coffee, he felt the formication return. Otherwise, he reports absence of fevers, chest pain, palpitations, abdominal pain, nausea, vomiting, hematochezia, melena, dysuria, hematuria, or weight loss. However, he notes that he has known lower extremity swelling that is worsen with sitting down but alleviated when elevating legs.

## 2023-08-22 NOTE — REVIEW OF SYSTEMS
[Lower Ext Edema] : lower extremity edema [Fever] : no fever [Chest Pain] : no chest pain [Abdominal Pain] : no abdominal pain [Nausea] : no nausea [Vomiting] : no vomiting [Melena] : no melena [Dysuria] : no dysuria [Hematuria] : no hematuria

## 2023-08-22 NOTE — ASSESSMENT
[FreeTextEntry1] : 73 M history DMT2, anemia, and chronic formication presenting for follow up regarding treatment for his formication (sensation of flies crawling on his scalp and legs), [facility], and whether he needs to continue taking folic acid. Found to have positive Lyme serology with positive Western blot and CBC manual differential concerning for possible early onset AML/PML.  #Anemia with CBC manual differential showing metamyelocytes and promyelocytes concerning for AML/PML and/or occult bleed  -Repeat CBC with manual differential -Heme/onc referral provided -Continue to monitor  #History of colonic polyp in 2010 on colonoscopy. Patient at risk for malignancy given anemia and colon polyp history -GI colonoscopy referral provided -Continue to monitor  #Lyme disease #Formication may be b12 related ? vs untreated Lyme disease -Hold folic acid for now as previous serum levels were wnl -Follow up serum B12 -Follow up serum folate -Prescribed doxycycline 100mg q12hrs x 14 days   #DMT2 non-insulin dependent -A1c ~7 and has been consistently 7's with prior results -Encourage patient to keep exercising   #Glaucoma -Ophthalmology referral provided -Continue to monitor  #History of lower extremity edema consistent with chronic venous insufficiency -Continue to monitor  #HCM -Ordered CBC with diff and manual diff.; CMP,  -Referrals provided  -Follow up as per routine or earlier as needed for medical concerns  Discussed case and management with Dr. Celena Reyes.

## 2023-08-25 LAB
FOLATE SERPL-MCNC: >20 NG/ML
VIT B12 SERPL-MCNC: >2000 PG/ML

## 2023-09-25 ENCOUNTER — OUTPATIENT (OUTPATIENT)
Dept: OUTPATIENT SERVICES | Facility: HOSPITAL | Age: 73
LOS: 1 days | End: 2023-09-25
Payer: MEDICARE

## 2023-09-25 ENCOUNTER — APPOINTMENT (OUTPATIENT)
Dept: INTERNAL MEDICINE | Facility: CLINIC | Age: 73
End: 2023-09-25
Payer: MEDICARE

## 2023-09-25 VITALS
DIASTOLIC BLOOD PRESSURE: 68 MMHG | HEART RATE: 85 BPM | OXYGEN SATURATION: 98 % | BODY MASS INDEX: 21.33 KG/M2 | SYSTOLIC BLOOD PRESSURE: 132 MMHG | WEIGHT: 149 LBS | HEIGHT: 70 IN

## 2023-09-25 DIAGNOSIS — R20.2 PARESTHESIA OF SKIN: ICD-10-CM

## 2023-09-25 DIAGNOSIS — A69.20 LYME DISEASE, UNSPECIFIED: ICD-10-CM

## 2023-09-25 DIAGNOSIS — I10 ESSENTIAL (PRIMARY) HYPERTENSION: ICD-10-CM

## 2023-09-25 PROCEDURE — G0463: CPT

## 2023-09-25 PROCEDURE — 99213 OFFICE O/P EST LOW 20 MIN: CPT | Mod: GE

## 2023-09-29 DIAGNOSIS — R20.2 PARESTHESIA OF SKIN: ICD-10-CM

## 2023-09-29 DIAGNOSIS — A69.20 LYME DISEASE, UNSPECIFIED: ICD-10-CM

## 2023-10-10 ENCOUNTER — OUTPATIENT (OUTPATIENT)
Dept: OUTPATIENT SERVICES | Facility: HOSPITAL | Age: 73
LOS: 1 days | Discharge: HOME | End: 2023-10-10

## 2023-10-10 DIAGNOSIS — D69.6 THROMBOCYTOPENIA, UNSPECIFIED: ICD-10-CM

## 2023-10-11 ENCOUNTER — RESULT REVIEW (OUTPATIENT)
Age: 73
End: 2023-10-11

## 2023-10-11 ENCOUNTER — NON-APPOINTMENT (OUTPATIENT)
Age: 73
End: 2023-10-11

## 2023-10-11 ENCOUNTER — APPOINTMENT (OUTPATIENT)
Dept: OPHTHALMOLOGY | Facility: CLINIC | Age: 73
End: 2023-10-11
Payer: MEDICARE

## 2023-10-11 ENCOUNTER — APPOINTMENT (OUTPATIENT)
Dept: HEMATOLOGY ONCOLOGY | Facility: CLINIC | Age: 73
End: 2023-10-11
Payer: MEDICARE

## 2023-10-11 VITALS
HEART RATE: 90 BPM | TEMPERATURE: 97.1 F | DIASTOLIC BLOOD PRESSURE: 71 MMHG | SYSTOLIC BLOOD PRESSURE: 144 MMHG | BODY MASS INDEX: 21.62 KG/M2 | RESPIRATION RATE: 17 BRPM | OXYGEN SATURATION: 98 % | HEIGHT: 70 IN | WEIGHT: 150.99 LBS

## 2023-10-11 LAB
ANISOCYTOSIS BLD QL: SLIGHT — SIGNIFICANT CHANGE UP
BASOPHILS # BLD AUTO: 0 K/UL — SIGNIFICANT CHANGE UP (ref 0–0.2)
BASOPHILS NFR BLD AUTO: 0 % — SIGNIFICANT CHANGE UP (ref 0–2)
BLASTS # FLD: 3.5 % — HIGH (ref 0–0)
DACRYOCYTES BLD QL SMEAR: SLIGHT — SIGNIFICANT CHANGE UP
ELLIPTOCYTES BLD QL SMEAR: SLIGHT — SIGNIFICANT CHANGE UP
EOSINOPHIL # BLD AUTO: 0.22 K/UL — SIGNIFICANT CHANGE UP (ref 0–0.5)
EOSINOPHIL NFR BLD AUTO: 4 % — SIGNIFICANT CHANGE UP (ref 0–6)
HCT VFR BLD CALC: 26.5 % — LOW (ref 39–50)
HGB BLD-MCNC: 8.5 G/DL — LOW (ref 13–17)
HYPOCHROMIA BLD QL: SLIGHT — SIGNIFICANT CHANGE UP
LG PLATELETS BLD QL AUTO: SLIGHT — SIGNIFICANT CHANGE UP
LYMPHOCYTES # BLD AUTO: 0.77 K/UL — LOW (ref 1–3.3)
LYMPHOCYTES # BLD AUTO: 14 % — SIGNIFICANT CHANGE UP (ref 13–44)
MCHC RBC-ENTMCNC: 26.8 PG — LOW (ref 27–34)
MCHC RBC-ENTMCNC: 32.1 G/DL — SIGNIFICANT CHANGE UP (ref 32–36)
MCV RBC AUTO: 83.6 FL — SIGNIFICANT CHANGE UP (ref 80–100)
METAMYELOCYTES # FLD: 3.5 % — HIGH (ref 0–0)
MONOCYTES # BLD AUTO: 0.19 K/UL — SIGNIFICANT CHANGE UP (ref 0–0.9)
MONOCYTES NFR BLD AUTO: 3.5 % — SIGNIFICANT CHANGE UP (ref 2–14)
MYELOCYTES NFR BLD: 11 % — HIGH (ref 0–0)
NEUTROPHILS # BLD AUTO: 3.32 K/UL — SIGNIFICANT CHANGE UP (ref 1.8–7.4)
NEUTROPHILS NFR BLD AUTO: 60.5 % — SIGNIFICANT CHANGE UP (ref 43–77)
NRBC # BLD: 10 /100 — HIGH (ref 0–0)
NRBC # BLD: SIGNIFICANT CHANGE UP /100 WBCS (ref 0–0)
PLAT MORPH BLD: ABNORMAL
PLATELET # BLD AUTO: 573 K/UL — HIGH (ref 150–400)
POIKILOCYTOSIS BLD QL AUTO: SIGNIFICANT CHANGE UP
POLYCHROMASIA BLD QL SMEAR: SLIGHT — SIGNIFICANT CHANGE UP
RBC # BLD: 3.17 M/UL — LOW (ref 4.2–5.8)
RBC # FLD: 30.7 % — HIGH (ref 10.3–14.5)
RBC BLD AUTO: ABNORMAL
RETICS #: 70.1 K/UL — SIGNIFICANT CHANGE UP (ref 25–125)
RETICS/RBC NFR: 2.2 % — SIGNIFICANT CHANGE UP (ref 0.5–2.5)
SCHISTOCYTES BLD QL AUTO: SLIGHT — SIGNIFICANT CHANGE UP
WBC # BLD: 5.49 K/UL — SIGNIFICANT CHANGE UP (ref 3.8–10.5)
WBC # FLD AUTO: 5.49 K/UL — SIGNIFICANT CHANGE UP (ref 3.8–10.5)

## 2023-10-11 PROCEDURE — 99214 OFFICE O/P EST MOD 30 MIN: CPT

## 2023-10-11 PROCEDURE — 92133 CPTRZD OPH DX IMG PST SGM ON: CPT

## 2023-10-11 PROCEDURE — 92004 COMPRE OPH EXAM NEW PT 1/>: CPT

## 2023-10-11 PROCEDURE — 92136 OPHTHALMIC BIOMETRY: CPT

## 2023-10-11 PROCEDURE — 76514 ECHO EXAM OF EYE THICKNESS: CPT

## 2023-10-12 LAB
ALBUMIN SERPL ELPH-MCNC: 4.5 G/DL
ALP BLD-CCNC: 92 U/L
ALT SERPL-CCNC: 13 U/L
ANION GAP SERPL CALC-SCNC: 13 MMOL/L
AST SERPL-CCNC: 18 U/L
BILIRUB SERPL-MCNC: 0.8 MG/DL
BUN SERPL-MCNC: 10 MG/DL
CALCIUM SERPL-MCNC: 8.4 MG/DL
CHLORIDE SERPL-SCNC: 103 MMOL/L
CO2 SERPL-SCNC: 24 MMOL/L
CREAT SERPL-MCNC: 0.89 MG/DL
EGFR: 90 ML/MIN/1.73M2
FERRITIN SERPL-MCNC: 483 NG/ML
FOLATE SERPL-MCNC: 18.1 NG/ML
GLUCOSE SERPL-MCNC: 172 MG/DL
IRON SATN MFR SERPL: 22 %
IRON SERPL-MCNC: 53 UG/DL
LDH SERPL-CCNC: 661 U/L
MANUAL DIF COMMENT BLD-IMP: SIGNIFICANT CHANGE UP
MANUAL DIF COMMENT BLD-IMP: SIGNIFICANT CHANGE UP
POTASSIUM SERPL-SCNC: 5 MMOL/L
PROT SERPL-MCNC: 6.9 G/DL
SODIUM SERPL-SCNC: 140 MMOL/L
TIBC SERPL-MCNC: 244 UG/DL
UIBC SERPL-MCNC: 191 UG/DL
VIT B12 SERPL-MCNC: >2000 PG/ML

## 2023-10-16 ENCOUNTER — RESULT REVIEW (OUTPATIENT)
Age: 73
End: 2023-10-16

## 2023-10-16 ENCOUNTER — LABORATORY RESULT (OUTPATIENT)
Age: 73
End: 2023-10-16

## 2023-10-16 ENCOUNTER — APPOINTMENT (OUTPATIENT)
Dept: HEMATOLOGY ONCOLOGY | Facility: CLINIC | Age: 73
End: 2023-10-16
Payer: MEDICARE

## 2023-10-16 VITALS
OXYGEN SATURATION: 96 % | TEMPERATURE: 96.8 F | RESPIRATION RATE: 16 BRPM | SYSTOLIC BLOOD PRESSURE: 118 MMHG | HEART RATE: 77 BPM | BODY MASS INDEX: 21.98 KG/M2 | WEIGHT: 153.22 LBS | DIASTOLIC BLOOD PRESSURE: 68 MMHG

## 2023-10-16 LAB
ANISOCYTOSIS BLD QL: SLIGHT — SIGNIFICANT CHANGE UP
ANISOCYTOSIS BLD QL: SLIGHT — SIGNIFICANT CHANGE UP
BASOPHILS # BLD AUTO: 0 K/UL — SIGNIFICANT CHANGE UP (ref 0–0.2)
BASOPHILS # BLD AUTO: 0 K/UL — SIGNIFICANT CHANGE UP (ref 0–0.2)
BASOPHILS NFR BLD AUTO: 0 % — SIGNIFICANT CHANGE UP (ref 0–2)
BASOPHILS NFR BLD AUTO: 0 % — SIGNIFICANT CHANGE UP (ref 0–2)
BLASTS # FLD: 3 % — HIGH (ref 0–0)
BLASTS # FLD: 3 % — HIGH (ref 0–0)
DACRYOCYTES BLD QL SMEAR: SLIGHT — SIGNIFICANT CHANGE UP
DACRYOCYTES BLD QL SMEAR: SLIGHT — SIGNIFICANT CHANGE UP
ELLIPTOCYTES BLD QL SMEAR: SLIGHT — SIGNIFICANT CHANGE UP
ELLIPTOCYTES BLD QL SMEAR: SLIGHT — SIGNIFICANT CHANGE UP
EOSINOPHIL # BLD AUTO: 0.11 K/UL — SIGNIFICANT CHANGE UP (ref 0–0.5)
EOSINOPHIL # BLD AUTO: 0.11 K/UL — SIGNIFICANT CHANGE UP (ref 0–0.5)
EOSINOPHIL NFR BLD AUTO: 2 % — SIGNIFICANT CHANGE UP (ref 0–6)
EOSINOPHIL NFR BLD AUTO: 2 % — SIGNIFICANT CHANGE UP (ref 0–6)
EPO SERPL-MCNC: 25.4 MIU/ML
HCT VFR BLD CALC: 25 % — LOW (ref 39–50)
HGB BLD-MCNC: 8.1 G/DL — LOW (ref 13–17)
HYPOCHROMIA BLD QL: SLIGHT — SIGNIFICANT CHANGE UP
HYPOCHROMIA BLD QL: SLIGHT — SIGNIFICANT CHANGE UP
LG PLATELETS BLD QL AUTO: SLIGHT — SIGNIFICANT CHANGE UP
LG PLATELETS BLD QL AUTO: SLIGHT — SIGNIFICANT CHANGE UP
LYMPHOCYTES # BLD AUTO: 1.01 K/UL — SIGNIFICANT CHANGE UP (ref 1–3.3)
LYMPHOCYTES # BLD AUTO: 1.01 K/UL — SIGNIFICANT CHANGE UP (ref 1–3.3)
LYMPHOCYTES # BLD AUTO: 19 % — SIGNIFICANT CHANGE UP (ref 13–44)
LYMPHOCYTES # BLD AUTO: 19 % — SIGNIFICANT CHANGE UP (ref 13–44)
MCHC RBC-ENTMCNC: 26.6 PG — LOW (ref 27–34)
MCHC RBC-ENTMCNC: 32.4 G/DL — SIGNIFICANT CHANGE UP (ref 32–36)
MCV RBC AUTO: 82 FL — SIGNIFICANT CHANGE UP (ref 80–100)
METAMYELOCYTES # FLD: 3 % — HIGH (ref 0–0)
METAMYELOCYTES # FLD: 3 % — HIGH (ref 0–0)
MONOCYTES # BLD AUTO: 0.11 K/UL — SIGNIFICANT CHANGE UP (ref 0–0.9)
MONOCYTES # BLD AUTO: 0.11 K/UL — SIGNIFICANT CHANGE UP (ref 0–0.9)
MONOCYTES NFR BLD AUTO: 2 % — SIGNIFICANT CHANGE UP (ref 2–14)
MONOCYTES NFR BLD AUTO: 2 % — SIGNIFICANT CHANGE UP (ref 2–14)
MYELOCYTES NFR BLD: 13 % — HIGH (ref 0–0)
MYELOCYTES NFR BLD: 13 % — HIGH (ref 0–0)
NEUTROPHILS # BLD AUTO: 3.1 K/UL — SIGNIFICANT CHANGE UP (ref 1.8–7.4)
NEUTROPHILS # BLD AUTO: 3.1 K/UL — SIGNIFICANT CHANGE UP (ref 1.8–7.4)
NEUTROPHILS NFR BLD AUTO: 58 % — SIGNIFICANT CHANGE UP (ref 43–77)
NEUTROPHILS NFR BLD AUTO: 58 % — SIGNIFICANT CHANGE UP (ref 43–77)
NRBC # BLD: 6 /100 — HIGH (ref 0–0)
NRBC # BLD: 6 /100 — HIGH (ref 0–0)
NRBC # BLD: SIGNIFICANT CHANGE UP /100 WBCS (ref 0–0)
NRBC # BLD: SIGNIFICANT CHANGE UP /100 WBCS (ref 0–0)
PLAT MORPH BLD: ABNORMAL
PLAT MORPH BLD: ABNORMAL
PLATELET # BLD AUTO: 471 K/UL — HIGH (ref 150–400)
POIKILOCYTOSIS BLD QL AUTO: SIGNIFICANT CHANGE UP
POIKILOCYTOSIS BLD QL AUTO: SIGNIFICANT CHANGE UP
POLYCHROMASIA BLD QL SMEAR: SLIGHT — SIGNIFICANT CHANGE UP
POLYCHROMASIA BLD QL SMEAR: SLIGHT — SIGNIFICANT CHANGE UP
RBC # BLD: 3.05 M/UL — LOW (ref 4.2–5.8)
RBC # FLD: 31.1 % — HIGH (ref 10.3–14.5)
RBC BLD AUTO: ABNORMAL
RBC BLD AUTO: ABNORMAL
SCHISTOCYTES BLD QL AUTO: SLIGHT — SIGNIFICANT CHANGE UP
SCHISTOCYTES BLD QL AUTO: SLIGHT — SIGNIFICANT CHANGE UP
SMUDGE CELLS # BLD: PRESENT — SIGNIFICANT CHANGE UP
SMUDGE CELLS # BLD: PRESENT — SIGNIFICANT CHANGE UP
STFR SERPL-MCNC: 39.6 NMOL/L
WBC # BLD: 5.34 K/UL — SIGNIFICANT CHANGE UP (ref 3.8–10.5)
WBC # FLD AUTO: 5.34 K/UL — SIGNIFICANT CHANGE UP (ref 3.8–10.5)

## 2023-10-16 PROCEDURE — 38222 DX BONE MARROW BX & ASPIR: CPT | Mod: LT

## 2023-10-16 RX ORDER — FOLIC ACID 1 MG/1
1 TABLET ORAL
Qty: 90 | Refills: 1 | Status: COMPLETED | COMMUNITY
Start: 2021-12-17 | End: 2023-10-16

## 2023-10-16 RX ORDER — DOXYCYCLINE HYCLATE 100 MG/1
100 CAPSULE ORAL
Qty: 28 | Refills: 0 | Status: COMPLETED | COMMUNITY
Start: 2023-08-21 | End: 2023-10-16

## 2023-10-17 LAB
JAK2 P.V617F BLD/T QL: NORMAL
REFLEX:: NORMAL

## 2023-11-08 ENCOUNTER — APPOINTMENT (OUTPATIENT)
Dept: OPHTHALMOLOGY | Facility: CLINIC | Age: 73
End: 2023-11-08

## 2023-11-08 ENCOUNTER — LABORATORY RESULT (OUTPATIENT)
Age: 73
End: 2023-11-08

## 2023-11-10 LAB
BASOPHILS # BLD AUTO: 0.21 K/UL
BASOPHILS NFR BLD AUTO: 3.6 %
EOSINOPHIL # BLD AUTO: 0.16 K/UL
EOSINOPHIL NFR BLD AUTO: 2.7 %
HCT VFR BLD CALC: 27 %
HGB BLD-MCNC: 8.5 G/DL
LYMPHOCYTES # BLD AUTO: 0.64 K/UL
LYMPHOCYTES NFR BLD AUTO: 10.8 %
MAN DIFF?: NORMAL
MCHC RBC-ENTMCNC: 26.6 PG
MCHC RBC-ENTMCNC: 31.5 GM/DL
MCV RBC AUTO: 84.6 FL
MONOCYTES # BLD AUTO: 0.11 K/UL
MONOCYTES NFR BLD AUTO: 1.8 %
NEUTROPHILS # BLD AUTO: 4.24 K/UL
NEUTROPHILS NFR BLD AUTO: 69.4 %
PLATELET # BLD AUTO: 628 K/UL
RBC # BLD: 3.19 M/UL
RBC # FLD: 31.4 %
WBC # FLD AUTO: 5.96 K/UL

## 2023-11-15 ENCOUNTER — APPOINTMENT (OUTPATIENT)
Dept: OPHTHALMOLOGY | Facility: CLINIC | Age: 73
End: 2023-11-15
Payer: MEDICARE

## 2023-11-15 ENCOUNTER — RESULT REVIEW (OUTPATIENT)
Age: 73
End: 2023-11-15

## 2023-11-15 ENCOUNTER — APPOINTMENT (OUTPATIENT)
Dept: HEMATOLOGY ONCOLOGY | Facility: CLINIC | Age: 73
End: 2023-11-15
Payer: MEDICARE

## 2023-11-15 ENCOUNTER — OUTPATIENT (OUTPATIENT)
Dept: OUTPATIENT SERVICES | Facility: HOSPITAL | Age: 73
LOS: 1 days | End: 2023-11-15
Payer: MEDICARE

## 2023-11-15 ENCOUNTER — NON-APPOINTMENT (OUTPATIENT)
Age: 73
End: 2023-11-15

## 2023-11-15 VITALS
HEART RATE: 72 BPM | WEIGHT: 150.99 LBS | BODY MASS INDEX: 21.67 KG/M2 | DIASTOLIC BLOOD PRESSURE: 72 MMHG | OXYGEN SATURATION: 98 % | SYSTOLIC BLOOD PRESSURE: 147 MMHG | RESPIRATION RATE: 18 BRPM | TEMPERATURE: 97.7 F

## 2023-11-15 DIAGNOSIS — C92.10 CHRONIC MYELOID LEUKEMIA, BCR/ABL-POSITIVE, NOT HAVING ACHIEVED REMISSION: ICD-10-CM

## 2023-11-15 LAB
ANISOCYTOSIS BLD QL: SLIGHT — SIGNIFICANT CHANGE UP
ANISOCYTOSIS BLD QL: SLIGHT — SIGNIFICANT CHANGE UP
BASOPHILS # BLD AUTO: 0 K/UL — SIGNIFICANT CHANGE UP (ref 0–0.2)
BASOPHILS # BLD AUTO: 0 K/UL — SIGNIFICANT CHANGE UP (ref 0–0.2)
BASOPHILS NFR BLD AUTO: 0 % — SIGNIFICANT CHANGE UP (ref 0–2)
BASOPHILS NFR BLD AUTO: 0 % — SIGNIFICANT CHANGE UP (ref 0–2)
BLASTS # FLD: 4 % — HIGH (ref 0–0)
BLASTS # FLD: 4 % — HIGH (ref 0–0)
DACRYOCYTES BLD QL SMEAR: SLIGHT — SIGNIFICANT CHANGE UP
DACRYOCYTES BLD QL SMEAR: SLIGHT — SIGNIFICANT CHANGE UP
ELLIPTOCYTES BLD QL SMEAR: SLIGHT — SIGNIFICANT CHANGE UP
ELLIPTOCYTES BLD QL SMEAR: SLIGHT — SIGNIFICANT CHANGE UP
EOSINOPHIL # BLD AUTO: 0.1 K/UL — SIGNIFICANT CHANGE UP (ref 0–0.5)
EOSINOPHIL # BLD AUTO: 0.1 K/UL — SIGNIFICANT CHANGE UP (ref 0–0.5)
EOSINOPHIL NFR BLD AUTO: 2 % — SIGNIFICANT CHANGE UP (ref 0–6)
EOSINOPHIL NFR BLD AUTO: 2 % — SIGNIFICANT CHANGE UP (ref 0–6)
HCT VFR BLD CALC: 28.2 % — LOW (ref 39–50)
HCT VFR BLD CALC: 28.2 % — LOW (ref 39–50)
HGB BLD-MCNC: 9 G/DL — LOW (ref 13–17)
HGB BLD-MCNC: 9 G/DL — LOW (ref 13–17)
HYPOCHROMIA BLD QL: SLIGHT — SIGNIFICANT CHANGE UP
HYPOCHROMIA BLD QL: SLIGHT — SIGNIFICANT CHANGE UP
LG PLATELETS BLD QL AUTO: SLIGHT — SIGNIFICANT CHANGE UP
LG PLATELETS BLD QL AUTO: SLIGHT — SIGNIFICANT CHANGE UP
LYMPHOCYTES # BLD AUTO: 0.89 K/UL — LOW (ref 1–3.3)
LYMPHOCYTES # BLD AUTO: 0.89 K/UL — LOW (ref 1–3.3)
LYMPHOCYTES # BLD AUTO: 18 % — SIGNIFICANT CHANGE UP (ref 13–44)
LYMPHOCYTES # BLD AUTO: 18 % — SIGNIFICANT CHANGE UP (ref 13–44)
MCHC RBC-ENTMCNC: 26.9 PG — LOW (ref 27–34)
MCHC RBC-ENTMCNC: 26.9 PG — LOW (ref 27–34)
MCHC RBC-ENTMCNC: 31.9 G/DL — LOW (ref 32–36)
MCHC RBC-ENTMCNC: 31.9 G/DL — LOW (ref 32–36)
MCV RBC AUTO: 84.4 FL — SIGNIFICANT CHANGE UP (ref 80–100)
MCV RBC AUTO: 84.4 FL — SIGNIFICANT CHANGE UP (ref 80–100)
MONOCYTES # BLD AUTO: 0.1 K/UL — SIGNIFICANT CHANGE UP (ref 0–0.9)
MONOCYTES # BLD AUTO: 0.1 K/UL — SIGNIFICANT CHANGE UP (ref 0–0.9)
MONOCYTES NFR BLD AUTO: 2 % — SIGNIFICANT CHANGE UP (ref 2–14)
MONOCYTES NFR BLD AUTO: 2 % — SIGNIFICANT CHANGE UP (ref 2–14)
MYELOCYTES NFR BLD: 10 % — HIGH (ref 0–0)
MYELOCYTES NFR BLD: 10 % — HIGH (ref 0–0)
NEUTROPHILS # BLD AUTO: 3.16 K/UL — SIGNIFICANT CHANGE UP (ref 1.8–7.4)
NEUTROPHILS # BLD AUTO: 3.16 K/UL — SIGNIFICANT CHANGE UP (ref 1.8–7.4)
NEUTROPHILS NFR BLD AUTO: 64 % — SIGNIFICANT CHANGE UP (ref 43–77)
NEUTROPHILS NFR BLD AUTO: 64 % — SIGNIFICANT CHANGE UP (ref 43–77)
NRBC # BLD: 5 /100 — HIGH (ref 0–0)
NRBC # BLD: 5 /100 — HIGH (ref 0–0)
NRBC # BLD: SIGNIFICANT CHANGE UP /100 WBCS (ref 0–0)
NRBC # BLD: SIGNIFICANT CHANGE UP /100 WBCS (ref 0–0)
PLAT MORPH BLD: ABNORMAL
PLAT MORPH BLD: ABNORMAL
PLATELET # BLD AUTO: 548 K/UL — HIGH (ref 150–400)
PLATELET # BLD AUTO: 548 K/UL — HIGH (ref 150–400)
POIKILOCYTOSIS BLD QL AUTO: SIGNIFICANT CHANGE UP
POIKILOCYTOSIS BLD QL AUTO: SIGNIFICANT CHANGE UP
POLYCHROMASIA BLD QL SMEAR: SLIGHT — SIGNIFICANT CHANGE UP
POLYCHROMASIA BLD QL SMEAR: SLIGHT — SIGNIFICANT CHANGE UP
RBC # BLD: 3.34 M/UL — LOW (ref 4.2–5.8)
RBC # BLD: 3.34 M/UL — LOW (ref 4.2–5.8)
RBC # FLD: 30.5 % — HIGH (ref 10.3–14.5)
RBC # FLD: 30.5 % — HIGH (ref 10.3–14.5)
RBC BLD AUTO: ABNORMAL
RBC BLD AUTO: ABNORMAL
SCHISTOCYTES BLD QL AUTO: SLIGHT — SIGNIFICANT CHANGE UP
SCHISTOCYTES BLD QL AUTO: SLIGHT — SIGNIFICANT CHANGE UP
WBC # BLD: 4.94 K/UL — SIGNIFICANT CHANGE UP (ref 3.8–10.5)
WBC # BLD: 4.94 K/UL — SIGNIFICANT CHANGE UP (ref 3.8–10.5)
WBC # FLD AUTO: 4.94 K/UL — SIGNIFICANT CHANGE UP (ref 3.8–10.5)
WBC # FLD AUTO: 4.94 K/UL — SIGNIFICANT CHANGE UP (ref 3.8–10.5)

## 2023-11-15 PROCEDURE — 92083 EXTENDED VISUAL FIELD XM: CPT

## 2023-11-15 PROCEDURE — 99215 OFFICE O/P EST HI 40 MIN: CPT

## 2023-11-15 PROCEDURE — 92014 COMPRE OPH EXAM EST PT 1/>: CPT

## 2023-11-20 LAB
ALBUMIN SERPL ELPH-MCNC: 4.4 G/DL
ALP BLD-CCNC: 81 U/L
ALT SERPL-CCNC: 13 U/L
ANION GAP SERPL CALC-SCNC: 12 MMOL/L
AST SERPL-CCNC: 15 U/L
BILIRUB SERPL-MCNC: 0.8 MG/DL
BUN SERPL-MCNC: 9 MG/DL
CALCIUM SERPL-MCNC: 8.5 MG/DL
CHLORIDE SERPL-SCNC: 105 MMOL/L
CO2 SERPL-SCNC: 25 MMOL/L
CREAT SERPL-MCNC: 0.8 MG/DL
EGFR: 93 ML/MIN/1.73M2
GLUCOSE SERPL-MCNC: 137 MG/DL
HGB A MFR BLD: 52.8 %
HGB A2 MFR BLD: 1.8 %
HGB F MFR BLD: 6.4 %
HGB FRACT BLD-IMP: NORMAL
HGB S MFR BLD: 39 %
LDH SERPL-CCNC: 629 U/L
POTASSIUM SERPL-SCNC: 4.2 MMOL/L
PROT SERPL-MCNC: 6.9 G/DL
SODIUM SERPL-SCNC: 142 MMOL/L
T(9;22)(ABL1,BCR)/CONTROL BLD/T: NORMAL

## 2023-11-22 ENCOUNTER — RESULT REVIEW (OUTPATIENT)
Age: 73
End: 2023-11-22

## 2023-11-22 ENCOUNTER — APPOINTMENT (OUTPATIENT)
Dept: HEMATOLOGY ONCOLOGY | Facility: CLINIC | Age: 73
End: 2023-11-22

## 2023-11-22 ENCOUNTER — APPOINTMENT (OUTPATIENT)
Dept: INFUSION THERAPY | Facility: HOSPITAL | Age: 73
End: 2023-11-22

## 2023-11-22 LAB
ANISOCYTOSIS BLD QL: SLIGHT — SIGNIFICANT CHANGE UP
ANISOCYTOSIS BLD QL: SLIGHT — SIGNIFICANT CHANGE UP
BASOPHILS # BLD AUTO: 0 K/UL — SIGNIFICANT CHANGE UP (ref 0–0.2)
BASOPHILS # BLD AUTO: 0 K/UL — SIGNIFICANT CHANGE UP (ref 0–0.2)
BASOPHILS NFR BLD AUTO: 0 % — SIGNIFICANT CHANGE UP (ref 0–2)
BASOPHILS NFR BLD AUTO: 0 % — SIGNIFICANT CHANGE UP (ref 0–2)
BLASTS # FLD: 2 % — HIGH (ref 0–0)
BLASTS # FLD: 2 % — HIGH (ref 0–0)
DACRYOCYTES BLD QL SMEAR: SLIGHT — SIGNIFICANT CHANGE UP
DACRYOCYTES BLD QL SMEAR: SLIGHT — SIGNIFICANT CHANGE UP
ELLIPTOCYTES BLD QL SMEAR: SLIGHT — SIGNIFICANT CHANGE UP
ELLIPTOCYTES BLD QL SMEAR: SLIGHT — SIGNIFICANT CHANGE UP
EOSINOPHIL # BLD AUTO: 0.17 K/UL — SIGNIFICANT CHANGE UP (ref 0–0.5)
EOSINOPHIL # BLD AUTO: 0.17 K/UL — SIGNIFICANT CHANGE UP (ref 0–0.5)
EOSINOPHIL NFR BLD AUTO: 3 % — SIGNIFICANT CHANGE UP (ref 0–6)
EOSINOPHIL NFR BLD AUTO: 3 % — SIGNIFICANT CHANGE UP (ref 0–6)
HCT VFR BLD CALC: 27.4 % — LOW (ref 39–50)
HCT VFR BLD CALC: 27.4 % — LOW (ref 39–50)
HGB BLD-MCNC: 8.6 G/DL — LOW (ref 13–17)
HGB BLD-MCNC: 8.6 G/DL — LOW (ref 13–17)
HYPOCHROMIA BLD QL: SLIGHT — SIGNIFICANT CHANGE UP
HYPOCHROMIA BLD QL: SLIGHT — SIGNIFICANT CHANGE UP
LG PLATELETS BLD QL AUTO: SLIGHT — SIGNIFICANT CHANGE UP
LG PLATELETS BLD QL AUTO: SLIGHT — SIGNIFICANT CHANGE UP
LYMPHOCYTES # BLD AUTO: 0.72 K/UL — LOW (ref 1–3.3)
LYMPHOCYTES # BLD AUTO: 0.72 K/UL — LOW (ref 1–3.3)
LYMPHOCYTES # BLD AUTO: 13 % — SIGNIFICANT CHANGE UP (ref 13–44)
LYMPHOCYTES # BLD AUTO: 13 % — SIGNIFICANT CHANGE UP (ref 13–44)
MCHC RBC-ENTMCNC: 26.5 PG — LOW (ref 27–34)
MCHC RBC-ENTMCNC: 26.5 PG — LOW (ref 27–34)
MCHC RBC-ENTMCNC: 31.4 G/DL — LOW (ref 32–36)
MCHC RBC-ENTMCNC: 31.4 G/DL — LOW (ref 32–36)
MCV RBC AUTO: 84.3 FL — SIGNIFICANT CHANGE UP (ref 80–100)
MCV RBC AUTO: 84.3 FL — SIGNIFICANT CHANGE UP (ref 80–100)
METAMYELOCYTES # FLD: 2 % — HIGH (ref 0–0)
METAMYELOCYTES # FLD: 2 % — HIGH (ref 0–0)
MONOCYTES # BLD AUTO: 0.06 K/UL — SIGNIFICANT CHANGE UP (ref 0–0.9)
MONOCYTES # BLD AUTO: 0.06 K/UL — SIGNIFICANT CHANGE UP (ref 0–0.9)
MONOCYTES NFR BLD AUTO: 1 % — LOW (ref 2–14)
MONOCYTES NFR BLD AUTO: 1 % — LOW (ref 2–14)
MYELOCYTES NFR BLD: 11 % — HIGH (ref 0–0)
MYELOCYTES NFR BLD: 11 % — HIGH (ref 0–0)
NEUTROPHILS # BLD AUTO: 3.77 K/UL — SIGNIFICANT CHANGE UP (ref 1.8–7.4)
NEUTROPHILS # BLD AUTO: 3.77 K/UL — SIGNIFICANT CHANGE UP (ref 1.8–7.4)
NEUTROPHILS NFR BLD AUTO: 68 % — SIGNIFICANT CHANGE UP (ref 43–77)
NEUTROPHILS NFR BLD AUTO: 68 % — SIGNIFICANT CHANGE UP (ref 43–77)
NRBC # BLD: 4 /100 — HIGH (ref 0–0)
NRBC # BLD: 4 /100 — HIGH (ref 0–0)
NRBC # BLD: SIGNIFICANT CHANGE UP /100 WBCS (ref 0–0)
NRBC # BLD: SIGNIFICANT CHANGE UP /100 WBCS (ref 0–0)
PLAT MORPH BLD: ABNORMAL
PLAT MORPH BLD: ABNORMAL
PLATELET # BLD AUTO: 635 K/UL — HIGH (ref 150–400)
PLATELET # BLD AUTO: 635 K/UL — HIGH (ref 150–400)
POIKILOCYTOSIS BLD QL AUTO: SIGNIFICANT CHANGE UP
POIKILOCYTOSIS BLD QL AUTO: SIGNIFICANT CHANGE UP
POLYCHROMASIA BLD QL SMEAR: SLIGHT — SIGNIFICANT CHANGE UP
POLYCHROMASIA BLD QL SMEAR: SLIGHT — SIGNIFICANT CHANGE UP
RBC # BLD: 3.25 M/UL — LOW (ref 4.2–5.8)
RBC # BLD: 3.25 M/UL — LOW (ref 4.2–5.8)
RBC # FLD: 30.7 % — HIGH (ref 10.3–14.5)
RBC # FLD: 30.7 % — HIGH (ref 10.3–14.5)
RBC BLD AUTO: ABNORMAL
RBC BLD AUTO: ABNORMAL
SCHISTOCYTES BLD QL AUTO: SLIGHT — SIGNIFICANT CHANGE UP
SCHISTOCYTES BLD QL AUTO: SLIGHT — SIGNIFICANT CHANGE UP
WBC # BLD: 5.55 K/UL — SIGNIFICANT CHANGE UP (ref 3.8–10.5)
WBC # BLD: 5.55 K/UL — SIGNIFICANT CHANGE UP (ref 3.8–10.5)
WBC # FLD AUTO: 5.55 K/UL — SIGNIFICANT CHANGE UP (ref 3.8–10.5)
WBC # FLD AUTO: 5.55 K/UL — SIGNIFICANT CHANGE UP (ref 3.8–10.5)

## 2023-11-27 ENCOUNTER — OUTPATIENT (OUTPATIENT)
Dept: OUTPATIENT SERVICES | Facility: HOSPITAL | Age: 73
LOS: 1 days | Discharge: HOME | End: 2023-11-27

## 2023-11-27 DIAGNOSIS — D69.6 THROMBOCYTOPENIA, UNSPECIFIED: ICD-10-CM

## 2023-11-29 ENCOUNTER — RESULT REVIEW (OUTPATIENT)
Age: 73
End: 2023-11-29

## 2023-11-29 ENCOUNTER — APPOINTMENT (OUTPATIENT)
Dept: HEMATOLOGY ONCOLOGY | Facility: CLINIC | Age: 73
End: 2023-11-29

## 2023-11-29 LAB
ANISOCYTOSIS BLD QL: SLIGHT — SIGNIFICANT CHANGE UP
ANISOCYTOSIS BLD QL: SLIGHT — SIGNIFICANT CHANGE UP
BASOPHILS # BLD AUTO: 0.17 K/UL — SIGNIFICANT CHANGE UP (ref 0–0.2)
BASOPHILS # BLD AUTO: 0.17 K/UL — SIGNIFICANT CHANGE UP (ref 0–0.2)
BASOPHILS NFR BLD AUTO: 3 % — HIGH (ref 0–2)
BASOPHILS NFR BLD AUTO: 3 % — HIGH (ref 0–2)
BLASTS # FLD: 2 % — HIGH (ref 0–0)
BLASTS # FLD: 2 % — HIGH (ref 0–0)
DACRYOCYTES BLD QL SMEAR: SIGNIFICANT CHANGE UP
DACRYOCYTES BLD QL SMEAR: SIGNIFICANT CHANGE UP
ELLIPTOCYTES BLD QL SMEAR: SLIGHT — SIGNIFICANT CHANGE UP
ELLIPTOCYTES BLD QL SMEAR: SLIGHT — SIGNIFICANT CHANGE UP
EOSINOPHIL # BLD AUTO: 0.23 K/UL — SIGNIFICANT CHANGE UP (ref 0–0.5)
EOSINOPHIL # BLD AUTO: 0.23 K/UL — SIGNIFICANT CHANGE UP (ref 0–0.5)
EOSINOPHIL NFR BLD AUTO: 4 % — SIGNIFICANT CHANGE UP (ref 0–6)
EOSINOPHIL NFR BLD AUTO: 4 % — SIGNIFICANT CHANGE UP (ref 0–6)
GIANT PLATELETS BLD QL SMEAR: PRESENT — SIGNIFICANT CHANGE UP
GIANT PLATELETS BLD QL SMEAR: PRESENT — SIGNIFICANT CHANGE UP
HCT VFR BLD CALC: 29.5 % — LOW (ref 39–50)
HCT VFR BLD CALC: 29.5 % — LOW (ref 39–50)
HGB BLD-MCNC: 9.5 G/DL — LOW (ref 13–17)
HGB BLD-MCNC: 9.5 G/DL — LOW (ref 13–17)
HYPOCHROMIA BLD QL: SLIGHT — SIGNIFICANT CHANGE UP
HYPOCHROMIA BLD QL: SLIGHT — SIGNIFICANT CHANGE UP
LG PLATELETS BLD QL AUTO: SLIGHT — SIGNIFICANT CHANGE UP
LG PLATELETS BLD QL AUTO: SLIGHT — SIGNIFICANT CHANGE UP
LYMPHOCYTES # BLD AUTO: 1.43 K/UL — SIGNIFICANT CHANGE UP (ref 1–3.3)
LYMPHOCYTES # BLD AUTO: 1.43 K/UL — SIGNIFICANT CHANGE UP (ref 1–3.3)
LYMPHOCYTES # BLD AUTO: 25 % — SIGNIFICANT CHANGE UP (ref 13–44)
LYMPHOCYTES # BLD AUTO: 25 % — SIGNIFICANT CHANGE UP (ref 13–44)
MCHC RBC-ENTMCNC: 26.7 PG — LOW (ref 27–34)
MCHC RBC-ENTMCNC: 26.7 PG — LOW (ref 27–34)
MCHC RBC-ENTMCNC: 32.2 G/DL — SIGNIFICANT CHANGE UP (ref 32–36)
MCHC RBC-ENTMCNC: 32.2 G/DL — SIGNIFICANT CHANGE UP (ref 32–36)
MCV RBC AUTO: 82.9 FL — SIGNIFICANT CHANGE UP (ref 80–100)
MCV RBC AUTO: 82.9 FL — SIGNIFICANT CHANGE UP (ref 80–100)
METAMYELOCYTES # FLD: 1 % — HIGH (ref 0–0)
METAMYELOCYTES # FLD: 1 % — HIGH (ref 0–0)
MONOCYTES # BLD AUTO: 0.17 K/UL — SIGNIFICANT CHANGE UP (ref 0–0.9)
MONOCYTES # BLD AUTO: 0.17 K/UL — SIGNIFICANT CHANGE UP (ref 0–0.9)
MONOCYTES NFR BLD AUTO: 3 % — SIGNIFICANT CHANGE UP (ref 2–14)
MONOCYTES NFR BLD AUTO: 3 % — SIGNIFICANT CHANGE UP (ref 2–14)
MYELOCYTES NFR BLD: 10 % — HIGH (ref 0–0)
MYELOCYTES NFR BLD: 10 % — HIGH (ref 0–0)
NEUTROPHILS # BLD AUTO: 2.98 K/UL — SIGNIFICANT CHANGE UP (ref 1.8–7.4)
NEUTROPHILS # BLD AUTO: 2.98 K/UL — SIGNIFICANT CHANGE UP (ref 1.8–7.4)
NEUTROPHILS NFR BLD AUTO: 52 % — SIGNIFICANT CHANGE UP (ref 43–77)
NEUTROPHILS NFR BLD AUTO: 52 % — SIGNIFICANT CHANGE UP (ref 43–77)
NRBC # BLD: 39 /100 — HIGH (ref 0–0)
NRBC # BLD: 39 /100 — HIGH (ref 0–0)
NRBC # BLD: SIGNIFICANT CHANGE UP /100 WBCS (ref 0–0)
NRBC # BLD: SIGNIFICANT CHANGE UP /100 WBCS (ref 0–0)
PLAT MORPH BLD: ABNORMAL
PLAT MORPH BLD: ABNORMAL
PLATELET # BLD AUTO: 549 K/UL — HIGH (ref 150–400)
PLATELET # BLD AUTO: 549 K/UL — HIGH (ref 150–400)
POIKILOCYTOSIS BLD QL AUTO: SIGNIFICANT CHANGE UP
POIKILOCYTOSIS BLD QL AUTO: SIGNIFICANT CHANGE UP
POLYCHROMASIA BLD QL SMEAR: SLIGHT — SIGNIFICANT CHANGE UP
POLYCHROMASIA BLD QL SMEAR: SLIGHT — SIGNIFICANT CHANGE UP
RBC # BLD: 3.56 M/UL — LOW (ref 4.2–5.8)
RBC # BLD: 3.56 M/UL — LOW (ref 4.2–5.8)
RBC # FLD: 31.1 % — HIGH (ref 10.3–14.5)
RBC # FLD: 31.1 % — HIGH (ref 10.3–14.5)
RBC BLD AUTO: ABNORMAL
RBC BLD AUTO: ABNORMAL
SCHISTOCYTES BLD QL AUTO: SIGNIFICANT CHANGE UP
SCHISTOCYTES BLD QL AUTO: SIGNIFICANT CHANGE UP
TARGETS BLD QL SMEAR: SLIGHT — SIGNIFICANT CHANGE UP
TARGETS BLD QL SMEAR: SLIGHT — SIGNIFICANT CHANGE UP
WBC # BLD: 5.73 K/UL — SIGNIFICANT CHANGE UP (ref 3.8–10.5)
WBC # BLD: 5.73 K/UL — SIGNIFICANT CHANGE UP (ref 3.8–10.5)
WBC # FLD AUTO: 5.73 K/UL — SIGNIFICANT CHANGE UP (ref 3.8–10.5)
WBC # FLD AUTO: 5.73 K/UL — SIGNIFICANT CHANGE UP (ref 3.8–10.5)

## 2023-12-06 ENCOUNTER — APPOINTMENT (OUTPATIENT)
Dept: HEMATOLOGY ONCOLOGY | Facility: CLINIC | Age: 73
End: 2023-12-06

## 2023-12-06 ENCOUNTER — RESULT REVIEW (OUTPATIENT)
Age: 73
End: 2023-12-06

## 2023-12-06 ENCOUNTER — APPOINTMENT (OUTPATIENT)
Dept: ULTRASOUND IMAGING | Facility: IMAGING CENTER | Age: 73
End: 2023-12-06
Payer: MEDICARE

## 2023-12-06 ENCOUNTER — OUTPATIENT (OUTPATIENT)
Dept: OUTPATIENT SERVICES | Facility: HOSPITAL | Age: 73
LOS: 1 days | End: 2023-12-06
Payer: MEDICARE

## 2023-12-06 ENCOUNTER — LABORATORY RESULT (OUTPATIENT)
Age: 73
End: 2023-12-06

## 2023-12-06 DIAGNOSIS — C92.10 CHRONIC MYELOID LEUKEMIA, BCR/ABL-POSITIVE, NOT HAVING ACHIEVED REMISSION: ICD-10-CM

## 2023-12-06 LAB
ANISOCYTOSIS BLD QL: SLIGHT — SIGNIFICANT CHANGE UP
ANISOCYTOSIS BLD QL: SLIGHT — SIGNIFICANT CHANGE UP
BASOPHILS # BLD AUTO: 0 K/UL — SIGNIFICANT CHANGE UP (ref 0–0.2)
BASOPHILS # BLD AUTO: 0 K/UL — SIGNIFICANT CHANGE UP (ref 0–0.2)
BASOPHILS NFR BLD AUTO: 0 % — SIGNIFICANT CHANGE UP (ref 0–2)
BASOPHILS NFR BLD AUTO: 0 % — SIGNIFICANT CHANGE UP (ref 0–2)
DACRYOCYTES BLD QL SMEAR: SIGNIFICANT CHANGE UP
DACRYOCYTES BLD QL SMEAR: SIGNIFICANT CHANGE UP
ELLIPTOCYTES BLD QL SMEAR: SLIGHT — SIGNIFICANT CHANGE UP
ELLIPTOCYTES BLD QL SMEAR: SLIGHT — SIGNIFICANT CHANGE UP
EOSINOPHIL # BLD AUTO: 0.05 K/UL — SIGNIFICANT CHANGE UP (ref 0–0.5)
EOSINOPHIL # BLD AUTO: 0.05 K/UL — SIGNIFICANT CHANGE UP (ref 0–0.5)
EOSINOPHIL NFR BLD AUTO: 1 % — SIGNIFICANT CHANGE UP (ref 0–6)
EOSINOPHIL NFR BLD AUTO: 1 % — SIGNIFICANT CHANGE UP (ref 0–6)
GIANT PLATELETS BLD QL SMEAR: PRESENT — SIGNIFICANT CHANGE UP
GIANT PLATELETS BLD QL SMEAR: PRESENT — SIGNIFICANT CHANGE UP
HCT VFR BLD CALC: 31.8 % — LOW (ref 39–50)
HCT VFR BLD CALC: 31.8 % — LOW (ref 39–50)
HGB BLD-MCNC: 9.9 G/DL — LOW (ref 13–17)
HGB BLD-MCNC: 9.9 G/DL — LOW (ref 13–17)
HYPOCHROMIA BLD QL: SLIGHT — SIGNIFICANT CHANGE UP
HYPOCHROMIA BLD QL: SLIGHT — SIGNIFICANT CHANGE UP
LG PLATELETS BLD QL AUTO: SLIGHT — SIGNIFICANT CHANGE UP
LG PLATELETS BLD QL AUTO: SLIGHT — SIGNIFICANT CHANGE UP
LYMPHOCYTES # BLD AUTO: 1.27 K/UL — SIGNIFICANT CHANGE UP (ref 1–3.3)
LYMPHOCYTES # BLD AUTO: 1.27 K/UL — SIGNIFICANT CHANGE UP (ref 1–3.3)
LYMPHOCYTES # BLD AUTO: 24 % — SIGNIFICANT CHANGE UP (ref 13–44)
LYMPHOCYTES # BLD AUTO: 24 % — SIGNIFICANT CHANGE UP (ref 13–44)
MCHC RBC-ENTMCNC: 26.6 PG — LOW (ref 27–34)
MCHC RBC-ENTMCNC: 26.6 PG — LOW (ref 27–34)
MCHC RBC-ENTMCNC: 31.1 G/DL — LOW (ref 32–36)
MCHC RBC-ENTMCNC: 31.1 G/DL — LOW (ref 32–36)
MCV RBC AUTO: 85.5 FL — SIGNIFICANT CHANGE UP (ref 80–100)
MCV RBC AUTO: 85.5 FL — SIGNIFICANT CHANGE UP (ref 80–100)
MONOCYTES # BLD AUTO: 0.21 K/UL — SIGNIFICANT CHANGE UP (ref 0–0.9)
MONOCYTES # BLD AUTO: 0.21 K/UL — SIGNIFICANT CHANGE UP (ref 0–0.9)
MONOCYTES NFR BLD AUTO: 4 % — SIGNIFICANT CHANGE UP (ref 2–14)
MONOCYTES NFR BLD AUTO: 4 % — SIGNIFICANT CHANGE UP (ref 2–14)
MYELOCYTES NFR BLD: 13 % — HIGH (ref 0–0)
MYELOCYTES NFR BLD: 13 % — HIGH (ref 0–0)
NEUTROPHILS # BLD AUTO: 3.07 K/UL — SIGNIFICANT CHANGE UP (ref 1.8–7.4)
NEUTROPHILS # BLD AUTO: 3.07 K/UL — SIGNIFICANT CHANGE UP (ref 1.8–7.4)
NEUTROPHILS NFR BLD AUTO: 58 % — SIGNIFICANT CHANGE UP (ref 43–77)
NEUTROPHILS NFR BLD AUTO: 58 % — SIGNIFICANT CHANGE UP (ref 43–77)
NRBC # BLD: 24 /100 — HIGH (ref 0–0)
NRBC # BLD: 24 /100 — HIGH (ref 0–0)
NRBC # BLD: SIGNIFICANT CHANGE UP /100 WBCS (ref 0–0)
NRBC # BLD: SIGNIFICANT CHANGE UP /100 WBCS (ref 0–0)
PLAT MORPH BLD: ABNORMAL
PLAT MORPH BLD: ABNORMAL
PLATELET # BLD AUTO: 410 K/UL — HIGH (ref 150–400)
PLATELET # BLD AUTO: 410 K/UL — HIGH (ref 150–400)
POIKILOCYTOSIS BLD QL AUTO: SIGNIFICANT CHANGE UP
POIKILOCYTOSIS BLD QL AUTO: SIGNIFICANT CHANGE UP
POLYCHROMASIA BLD QL SMEAR: SLIGHT — SIGNIFICANT CHANGE UP
POLYCHROMASIA BLD QL SMEAR: SLIGHT — SIGNIFICANT CHANGE UP
RBC # BLD: 3.72 M/UL — LOW (ref 4.2–5.8)
RBC # BLD: 3.72 M/UL — LOW (ref 4.2–5.8)
RBC # FLD: 32.3 % — HIGH (ref 10.3–14.5)
RBC # FLD: 32.3 % — HIGH (ref 10.3–14.5)
RBC BLD AUTO: ABNORMAL
RBC BLD AUTO: ABNORMAL
SCHISTOCYTES BLD QL AUTO: SIGNIFICANT CHANGE UP
SCHISTOCYTES BLD QL AUTO: SIGNIFICANT CHANGE UP
WBC # BLD: 5.29 K/UL — SIGNIFICANT CHANGE UP (ref 3.8–10.5)
WBC # BLD: 5.29 K/UL — SIGNIFICANT CHANGE UP (ref 3.8–10.5)
WBC # FLD AUTO: 5.29 K/UL — SIGNIFICANT CHANGE UP (ref 3.8–10.5)
WBC # FLD AUTO: 5.29 K/UL — SIGNIFICANT CHANGE UP (ref 3.8–10.5)

## 2023-12-06 PROCEDURE — 76700 US EXAM ABDOM COMPLETE: CPT

## 2023-12-06 PROCEDURE — 86850 RBC ANTIBODY SCREEN: CPT

## 2023-12-06 PROCEDURE — 76700 US EXAM ABDOM COMPLETE: CPT | Mod: 26

## 2023-12-06 PROCEDURE — 86901 BLOOD TYPING SEROLOGIC RH(D): CPT

## 2023-12-06 PROCEDURE — 86900 BLOOD TYPING SEROLOGIC ABO: CPT

## 2023-12-13 ENCOUNTER — RESULT REVIEW (OUTPATIENT)
Age: 73
End: 2023-12-13

## 2023-12-13 ENCOUNTER — APPOINTMENT (OUTPATIENT)
Dept: HEMATOLOGY ONCOLOGY | Facility: CLINIC | Age: 73
End: 2023-12-13
Payer: MEDICARE

## 2023-12-13 ENCOUNTER — APPOINTMENT (OUTPATIENT)
Dept: INFUSION THERAPY | Facility: HOSPITAL | Age: 73
End: 2023-12-13

## 2023-12-13 VITALS
OXYGEN SATURATION: 99 % | TEMPERATURE: 97.2 F | DIASTOLIC BLOOD PRESSURE: 71 MMHG | RESPIRATION RATE: 23 BRPM | SYSTOLIC BLOOD PRESSURE: 152 MMHG | BODY MASS INDEX: 22.11 KG/M2 | HEART RATE: 76 BPM | WEIGHT: 154.1 LBS

## 2023-12-13 LAB
ANISOCYTOSIS BLD QL: SIGNIFICANT CHANGE UP
BASOPHILS # BLD AUTO: 0.18 K/UL — SIGNIFICANT CHANGE UP (ref 0–0.2)
BASOPHILS NFR BLD AUTO: 3 % — HIGH (ref 0–2)
DACRYOCYTES BLD QL SMEAR: SIGNIFICANT CHANGE UP
ELLIPTOCYTES BLD QL SMEAR: SLIGHT — SIGNIFICANT CHANGE UP
EOSINOPHIL # BLD AUTO: 0.15 K/UL — SIGNIFICANT CHANGE UP (ref 0–0.5)
EOSINOPHIL NFR BLD AUTO: 2.5 % — SIGNIFICANT CHANGE UP (ref 0–6)
GIANT PLATELETS BLD QL SMEAR: PRESENT — SIGNIFICANT CHANGE UP
HCT VFR BLD CALC: 33.4 % — LOW (ref 39–50)
HGB BLD-MCNC: 10.4 G/DL — LOW (ref 13–17)
LG PLATELETS BLD QL AUTO: SIGNIFICANT CHANGE UP
LYMPHOCYTES # BLD AUTO: 0.54 K/UL — LOW (ref 1–3.3)
LYMPHOCYTES # BLD AUTO: 9 % — LOW (ref 13–44)
MCHC RBC-ENTMCNC: 26.6 PG — LOW (ref 27–34)
MCHC RBC-ENTMCNC: 31.1 G/DL — LOW (ref 32–36)
MCV RBC AUTO: 85.4 FL — SIGNIFICANT CHANGE UP (ref 80–100)
METAMYELOCYTES # FLD: 1.5 % — HIGH (ref 0–0)
MONOCYTES # BLD AUTO: 0.45 K/UL — SIGNIFICANT CHANGE UP (ref 0–0.9)
MONOCYTES NFR BLD AUTO: 7.5 % — SIGNIFICANT CHANGE UP (ref 2–14)
MYELOCYTES NFR BLD: 14.5 % — HIGH (ref 0–0)
NEUTROPHILS # BLD AUTO: 3.7 K/UL — SIGNIFICANT CHANGE UP (ref 1.8–7.4)
NEUTROPHILS NFR BLD AUTO: 62 % — SIGNIFICANT CHANGE UP (ref 43–77)
NRBC # BLD: 15 /100 — HIGH (ref 0–0)
NRBC # BLD: SIGNIFICANT CHANGE UP /100 WBCS (ref 0–0)
PLAT MORPH BLD: ABNORMAL
PLATELET # BLD AUTO: 450 K/UL — HIGH (ref 150–400)
POIKILOCYTOSIS BLD QL AUTO: SIGNIFICANT CHANGE UP
POLYCHROMASIA BLD QL SMEAR: SLIGHT — SIGNIFICANT CHANGE UP
RBC # BLD: 3.91 M/UL — LOW (ref 4.2–5.8)
RBC # FLD: 31.8 % — HIGH (ref 10.3–14.5)
RBC BLD AUTO: ABNORMAL
SCHISTOCYTES BLD QL AUTO: SIGNIFICANT CHANGE UP
TARGETS BLD QL SMEAR: SLIGHT — SIGNIFICANT CHANGE UP
WBC # BLD: 5.96 K/UL — SIGNIFICANT CHANGE UP (ref 3.8–10.5)
WBC # FLD AUTO: 5.96 K/UL — SIGNIFICANT CHANGE UP (ref 3.8–10.5)

## 2023-12-13 PROCEDURE — 99213 OFFICE O/P EST LOW 20 MIN: CPT

## 2023-12-13 RX ORDER — ASPIRIN ENTERIC COATED TABLETS 81 MG 81 MG/1
81 TABLET, DELAYED RELEASE ORAL
Qty: 30 | Refills: 3 | Status: ACTIVE | COMMUNITY
Start: 2023-11-15 | End: 1900-01-01

## 2023-12-13 NOTE — CONSULT LETTER
[Dear  ___] : Dear  [unfilled], [Consult Letter:] : I had the pleasure of evaluating your patient, [unfilled]. [Please see my note below.] : Please see my note below. [Consult Closing:] : Thank you very much for allowing me to participate in the care of this patient.  If you have any questions, please do not hesitate to contact me. [Sincerely,] : Sincerely, [FreeTextEntry3] : Jose Mayers MD, PhD, MS \par  Attending Physician \par  Hematology-Oncology \par  [facility]\par

## 2023-12-13 NOTE — REASON FOR VISIT
[Follow-Up Visit] : a follow-up visit for [FreeTextEntry2] : Anemia, thrombocytosis, r/o myelofibrosis

## 2023-12-13 NOTE — ASSESSMENT
[FreeTextEntry1] : 71-year-old Mr. BAIN is seen in follow up for anemia, thrombocytosis and rule out myelofibrosis. Patient was initially seen more than a year ago (02/2022). He was and continues to be completely asymptomatic. His anemia was normocytic, he has had low folic acid that normalized after folate supplementation. Noted to have mild high ferritin and persistent moderately high LDH in the absence evidence of hemolysis. He did not have splenomegaly on physical exam (02/2022). Intramedullary hemolysis may have happened due to folate deficiency; However, a primary bone marrow process like PMF could not be ruled out. This year (10/2023) he returned for further w/u.  He underwent a bone marrow biopsy. Results are interesting:  Bone marrow biopsy and bone marrow aspirate - Myeloid neoplasm with concurrent BCR-ABL1 translocation, and JAK2 and SF3B1 co-mutations and severe fibrosis, dyserythropoiesis (ring sideroblasts), markedly increased in megakaryocytes, atypical megakaryocytes  (some small some enlarged with multi-lobated nuclei, some naked nuclei) no excess blasts.   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 Mayers

## 2023-12-13 NOTE — HISTORY OF PRESENT ILLNESS
[de-identified] : 71 year-old Mr. BAIN is seen in consult for "anemia, thrombocytosis, r/o myelofibrosis". Patient states that since Sept 2021 he has been feeling like flies crawling inside his head when he is smelling food or eating; then the flies crawl to his legs. He went to his PCP for this symptom where after a lot of blood work he was found to be anemic, folate deficient. He has been on folic acid since mid January. Lab work also showed thrombocytosis (506). His Hgb was 8.6, MCV normal range, no  retic response, normal iron studies, high ferritin (485), normal  B12. Of note LDH was high at ~775, haptoglobin was normal range (~75). The patient denies any fatigue, weight loss, night sweats, fever. He has no complaints other than the "flies" which have long been abundant in his apartment and now they are crawling in his brain. Of note, patient has no other medical condition and he exercises regularly.   [de-identified] : 10/11/2023 Patient presents for a follow up. He was initially seen in 02/2022. He never returned for a follow up. He has done well in the interim. Denies any major change in his health status. He returned for a follow up as his PCP insisted him so. He admits to some fatigue. Denies any weight loss or early satiety. He denies any abnormal bleeding or thrombosis.   11/145/2023 Patient returns for a follow up and discuss the lab and biopsy results. He endorses no changes in his health status. He had a bone marrow biopsy done in the interim. most of the results are now available. Detailed results are copied in the Results section.   12/13/23 Patient seen in follow up. Since previous visit, he reported that he has not picked up Jakafi or Gleevac yet.. Re-iterated to the pt how important it is to start his medications ASAP to control his disease. He has started Luspatercept and responding well. Today his Hgb is 10.4! Denies fevers, night sweats, unintentional weight loss. No changes in health. Good appetite, stable weight

## 2023-12-13 NOTE — PHYSICAL EXAM
[Fully active, able to carry on all pre-disease performance without restriction] : Status 0 - Fully active, able to carry on all pre-disease performance without restriction [Normal] : affect appropriate [de-identified] : Palpable spleen 5-6 cm below the costal margin

## 2023-12-13 NOTE — RESULTS/DATA
[FreeTextEntry1] : 12/13/23 WBC 5.96 Hgb 8.6 >> 9.5 >> 9.9 >> 10.4 Platelet 450k Pending CMP, LDH, Ferritin   11/15/2023 BCR Final Report  Accession Number: [id number] Date Collected: 11/08/2023 Date Received: 11/09/2023 Date Reported: 11/10/2023 15:40  Specimen: .Blood Indication:  CML/ALL  Test Performed: Quantitative BCR-ABL by real time RT-PCR ________________________________________________________________  RESULTS:   p210: Not Detected   p190: Positive  %BCR-ABL/ABL= >10.000 %   INTERPRETATION:  The specimen analyzed contains the translocation mBCR-ABL1 (p190) associated with Acute Lymphoblastic leukemia. Clinical correlation is recommended.   	 Patient:     BRUCE BAIN   Accession:                             [id number]  Collected Date/Time:                   10/16/2023 17:15 EDT Received Date/Time:                    10/16/2023 20:36 EDT  Hematopathology Report - Auth (Verified)  Specimen(s) Submitted 1  Left pic 2  Bone marrow aspirate  Final Diagnosis 1, 2. Bone marrow biopsy and bone marrow aspirate      - Myeloid neoplasm with concurrent BCR-ABL1 translocation, and  JAK2 and SF3B1 co-mutations and severe fibrosis  See note and description.  Diagnostic note: Per chart review, patient has normocytic anemia with folic acid deficiency which has been corrected now and thrombocytosis in the range of 470K to 600K since December 2021. His hemoglobin levels are trending down from 12.6g/dL to 8.1g/dL. This is the first  bone marrow biopsy and patient has never been treated. Biopsy shows overtly fibrotic marrow with maturing trilineage hematopoiesis, markedly increased megakaryopoiesis and dyserythropoiesis (ring sideroblasts). There is no increase in blast population. Karyotype analysis shows t(9;22)(q34;q11.2) in 9 out of 20 metaphases.  Molecular studies show  JAK2 p.Jet103Cqu and   SF3B1 p.Jqx161Ztu mutation. Overall the    findings are consistent with myeloid neoplasm with concurrent BCR-ABL1 translocation, and  JAK2 and SF3B1 co-mutations and severe fibrosis. 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 However the detection of BCR/ABL1 translocation by chromosome analysis is a   diagnostic feature of chronic myeloid leukemia.  Co-occurance of  BCR-ABL1 and JAK2 V617F  mutations have been described in small case series. The frequency of co-occurrence, the temporal order of acquisition and clonal relationship of these alterations is poorly understood and it is also unclear for this case since there is no prior bone marrow  biopsy or diagnostic work up. Literature of these rare cases shows many patients progress  to myelofibrosis like our patient, raising the possibility that the two mutant tyrosine    kinases may accelerate disease progression.  Clinical correlation is recommended.  Case is discussed with Dr Jose Mayers via phone call on 10/27/2023 3:30 pm  Microscopic description: 1. Biopsy: Quality:  Small (0.9 cm) core biopsy Cellularity:  30% (patchy cellularity with hematopoiesis alternating with hypocellular   areas of fibrotic stroma)    Myeloid lineage:  Decreased in number, exhibits full    maturation    Erythroid lineage:  Decreased in number, exhibits full    maturation with increased pronormoblasts    Megakaryocytes:  Markedly increased in number, atypical    megakaryocytes  (some small some enlarged with    multilobated nuclei, some naked nuclei ) forming large    clusters in the marrow and also within dilated sinusoids    Blasts:  Not increased    Lymphocytes  Not increased in number, mostly small mature    looking lymphocytes are seen    Plasma cells:  Not increased    Stroma:   Marked fibrosis with dilated sinusoids and    intrasinusoidal hematopoiesis    Clot:   No marrow fragments     2. Aspirate:    Myeloid lineage:  Exhibits full maturation, no    significant dysplastic        changes seen     Erythroid lineage:  Exhibit full maturation with    increased pronormoblasts, no significant dysplastic    changes seen Megakaryocytes:  Rare Lymphocytes:  Not increased Plasma cells:   Not increased Blasts:    Not increased  Bone Marrow Asp Smear Diff Cell Count:  200 Cells. Type  Normal* % Blast  0-3 0% Promyelocyte  1-8 0% Myelocyte  10-15 3% Metamyelocyte  10-15 3% Band/Neutrophil  12-25  37% Eosinophil  1-5 1% Basophil  0-1 0% Pronormoblast  0-2 4% Normoblast  15-25 35% Monocyte  0-2 0% Lymphocyte  10-15 17% Plasma cell  0-1 0%   *Adult Range  Peripheral Blood Smear:   Not available  Ancillary studies Special stains on bone marrow aspirate: Iron stain:  No spicules are present to evaluate for iron stores. There is increase in ring sideroblasts (13%).  Special stains on core biopsy: Reticulin:  Diffuse and dense increase in reticulin with extensive intersections and coarse bundles of thick fibers consistent with collagen (MF=3) Trichrome:  Collagen fibrosis Iron:  Positive  Immunohistochemical stains:    CD34, , MPO, Factor VIII, CD71, E-cad, p53 stains are performed on block 1A  CD34:  Highlights vascular structures, no increase in blast population :  Highlights  scattered masts cells. No increase in blast population MPO:  Highlights myeloid series, decreased Factor VIII:  Highlights megakaryocytes, markedly increased, including occasional small forms CD71 : Highlights erythroid series, decreased E-cad:  Highlights early erythroid series, increased p53:  Normal staining pattern  Flow cytometry analysis (85-XK-):    CD45/side scatter shows myeloblast population (1.21% of total) with normal antigen maturation pattern.  There is no increase in CD34, CD14 or  positive cells. Myeloid antigen pattern is normal with CD13, CD16 and CD11b. Monocytes show normal antigen maturation pattern Lymphocytes (11% of cells): Heterogeneous population of T-cells (with a  CD4 to CD8 ratio 2.31) with no aberrancy or loss of pan-T cell antigen, immunophenotypically unremarkable natural killer cells, and polytypic B-cells  Cytogenetics: Karyotype ([id number]) :   46,XY,t(9;22)(q34;q11.2)[9]/46,XY[11]  Nine of the twenty metaphases examined revealed a translocation involving the long arms of chromosomes 9 and 22 giving rise to the classic [place] chromosome.   The remaining metaphases had an apparently normal karyotype.  Please note that the Initial FISH studies showed normal results for BCR::ABL1 fusion however, subsequent g-banded karyotyping showed a translocation involving chromosomes 9 and 22, with a variant of the classic t(9;22)(q34;q11.2). Therefore, sequential FISH studies were performed using   A  dual-fusion three color probe for BCR and ABL1/ASS1, which showed an atypical signal pattern with A a single BCR::ABL1 fusion signal on alley(22) homologue,    one orange, one green and one aqua instead of two fusion, one orange, one green and two aqua.  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  Additionally, retrospective and repeat FISH studies using dual-fusion three color probe for BCR and ABL1/ASS1 in interphase cells still showed normal results due to the reason mentioned above.  Fluorescence in situ Hybridization ([address] [place] [address], [address] [place] [address], [address] [place] [address]): NORMAL FISH - BCR::ABL1 NORMAL FISH - 5q NORMAL FISH - MPD PANEL  Probe(s) and Location(s): ASS1, ABL1 (9q34), BCR (22q11.2) ISCN Nomenclature: nuc zak(ASS1,ABL1,BCR)x2[194/200] Probe(s) and Location(s):   V7S773/ D5S23 (5p15.2), EGR1 (5q31) ISCN Nomenclature:  nuc zak(U6D684/D5S23,EGR1)x2[198/200] Probe(s) and Location(s):   PDGFRA LSI 4q12 (4q12) ([facility]), PDGFRB (0x77-m96) ([facility]), FGFR1 (8p11.23-p11.2), CEP 8/D8Z2(8p11.1-q11.1) ([employer]) ISCN Nomenclature:  nuc zak(SCFD2,LNX,PDGFRA)x2[198/200],(PDGFRBx2)[197/200 ], (FGFR1,D8Z2)x2[198/200]  Molecular Studies: OnkoSit Advanced NGS Myeloid Panel  DETECTED GENOMIC ALTERATIONS:   Tier I: Variants of Strong Clinical Significance   JAK2 p.Ecy408Fou  Chr: 9   Pos: [id number]   Ref: G   Alt: T    Coverage: 792    Allele Freq: 46.34   cDNA change: c.1849G>T   Exon: 14   ClinVar ID: -    SF3B1 p.Dsu548Upb   Chr: 2   Pos: [id number]   Ref: T   Alt: C   Coverage: 613   Allele Freq: 43.88   cDNA change: c.2098A>G   Exon: 15   ClinVar ID: -    Tier II: Variants of Potential Clinical Significance   ATRX p.[id number]   Chr: X   Pos: [id number]   Ref: CT   Alt: C   Coverage: 673   Allele Freq: 5.65   cDNA change: c.6848del   Exon: 31   ClinVar ID: -  PERTINENT NEGATIVE RESULTS:   The following genes are NEGATIVE for clinically relevant mutations.   Mutational hotspots and surrounding exonic regions were interrogated   for DNA level point mutations and indels (fusions not assayed).   ABL1, ANKRD26, ASXL1, BCOR, BCORL1, BRAF, CALR, CBL, CCND2, CDKN2A,   CEBPA, CSF3R, CUX1, DDX41, DNMT3A, ETNK1, ETV6, EZH2, FBXW7, FLT3,   GATA2, HRAS, IDH1, IDH2, KDM6A, KIT, KMT2A, KRAS, MAP2K1, MPL,   MYD88, NF1, NPM1, NRAS, PDGFRA, PHF6, PTEN, PTPN11, RUNX1, SETBP1,   SRSF2, STAG2, TET2, TP53, U2AF1, WT1, ZRSR2  JAK2  V617F Activating Mutation Assay by Real-Time PCR ([id number]): Positive JAK2 V617F %: 39.7 %  Comment: Iron stain (examined to evaluate for iron stores, see microscopic description) and Giemsa stain (shows appropriate staining pattern) are performed on blocks 1A, 1B  Verified by: Mihcael Herman MD (Electronic Signature) Reported on: 11/03/23 12:39 EDT, [facility] [facility] [place] [address] Phone: [phone]   Fax: [phone] _________________________________________________________________  Clinical Information *** The external document could not be loaded. ***   Gross Description 1. The specimen is received in Bouins fixative and the specimen container is labeled    "L Pic". It consists of one bone marrow core measuring 0.9 x 0.2 cm with a 0.8 x 0.8 x 0.1 cm aggregate of clotted blood. Entirely submitted in 2 cassettes:   1A: Bone marrow core   1B: Blood clot  2. Two Barba-Giemsa and one iron stained bone marrow aspirate smears are submitted.  Tia CANTU 10/17/2023 06:36 AM.  Disclaimer In addition to other data that may appear on the specimen containers, all labels have been inspected to confirm the presence of the patients name and date of birth.  Histological Processing Performed at [facility], Department of Pathology, [address] [place] [address].    10/11/2023 Normocytic anemia - Hgb 8.5 Thrombocytosis Plt 573 Manual differential reportedly showed 2-3% blasts    2/4/2022  Available labs reviewed, relevant data as below: Anemia, Hgb 8.6, normal MCV (84); no retic response, Normal iron studies, high ferritin (485),  Low Folic acid  (3.3), normal  B12 Thrombocytosis (506).   LDH high at ~750  Haptoglobin normal range.

## 2023-12-16 LAB
ALBUMIN SERPL ELPH-MCNC: 4.5 G/DL
ALP BLD-CCNC: 80 U/L
ALT SERPL-CCNC: 12 U/L
ANION GAP SERPL CALC-SCNC: 12 MMOL/L
AST SERPL-CCNC: 14 U/L
BILIRUB SERPL-MCNC: 0.9 MG/DL
BUN SERPL-MCNC: 18 MG/DL
CALCIUM SERPL-MCNC: 8.7 MG/DL
CHLORIDE SERPL-SCNC: 106 MMOL/L
CO2 SERPL-SCNC: 25 MMOL/L
CREAT SERPL-MCNC: 0.88 MG/DL
EGFR: 90 ML/MIN/1.73M2
GLUCOSE SERPL-MCNC: 138 MG/DL
LDH SERPL-CCNC: 752 U/L
POTASSIUM SERPL-SCNC: 5.2 MMOL/L
PROT SERPL-MCNC: 7 G/DL
SODIUM SERPL-SCNC: 143 MMOL/L
URATE SERPL-MCNC: 8.7 MG/DL

## 2023-12-20 ENCOUNTER — RESULT REVIEW (OUTPATIENT)
Age: 73
End: 2023-12-20

## 2023-12-20 ENCOUNTER — APPOINTMENT (OUTPATIENT)
Dept: HEMATOLOGY ONCOLOGY | Facility: CLINIC | Age: 73
End: 2023-12-20

## 2023-12-20 LAB
ANISOCYTOSIS BLD QL: SIGNIFICANT CHANGE UP
ANISOCYTOSIS BLD QL: SIGNIFICANT CHANGE UP
BASOPHILS # BLD AUTO: 0.34 K/UL — HIGH (ref 0–0.2)
BASOPHILS # BLD AUTO: 0.34 K/UL — HIGH (ref 0–0.2)
BASOPHILS NFR BLD AUTO: 3 % — HIGH (ref 0–2)
BASOPHILS NFR BLD AUTO: 3 % — HIGH (ref 0–2)
BLASTS # FLD: 2 % — HIGH (ref 0–0)
BLASTS # FLD: 2 % — HIGH (ref 0–0)
DACRYOCYTES BLD QL SMEAR: SIGNIFICANT CHANGE UP
DACRYOCYTES BLD QL SMEAR: SIGNIFICANT CHANGE UP
ELLIPTOCYTES BLD QL SMEAR: SLIGHT — SIGNIFICANT CHANGE UP
ELLIPTOCYTES BLD QL SMEAR: SLIGHT — SIGNIFICANT CHANGE UP
EOSINOPHIL # BLD AUTO: 0.17 K/UL — SIGNIFICANT CHANGE UP (ref 0–0.5)
EOSINOPHIL # BLD AUTO: 0.17 K/UL — SIGNIFICANT CHANGE UP (ref 0–0.5)
EOSINOPHIL NFR BLD AUTO: 1.5 % — SIGNIFICANT CHANGE UP (ref 0–6)
EOSINOPHIL NFR BLD AUTO: 1.5 % — SIGNIFICANT CHANGE UP (ref 0–6)
GIANT PLATELETS BLD QL SMEAR: PRESENT — SIGNIFICANT CHANGE UP
GIANT PLATELETS BLD QL SMEAR: PRESENT — SIGNIFICANT CHANGE UP
HCT VFR BLD CALC: 32.9 % — LOW (ref 39–50)
HCT VFR BLD CALC: 32.9 % — LOW (ref 39–50)
HGB BLD-MCNC: 10.6 G/DL — LOW (ref 13–17)
HGB BLD-MCNC: 10.6 G/DL — LOW (ref 13–17)
LG PLATELETS BLD QL AUTO: SIGNIFICANT CHANGE UP
LG PLATELETS BLD QL AUTO: SIGNIFICANT CHANGE UP
LYMPHOCYTES # BLD AUTO: 2.59 K/UL — SIGNIFICANT CHANGE UP (ref 1–3.3)
LYMPHOCYTES # BLD AUTO: 2.59 K/UL — SIGNIFICANT CHANGE UP (ref 1–3.3)
LYMPHOCYTES # BLD AUTO: 23 % — SIGNIFICANT CHANGE UP (ref 13–44)
LYMPHOCYTES # BLD AUTO: 23 % — SIGNIFICANT CHANGE UP (ref 13–44)
MCHC RBC-ENTMCNC: 26.6 PG — LOW (ref 27–34)
MCHC RBC-ENTMCNC: 26.6 PG — LOW (ref 27–34)
MCHC RBC-ENTMCNC: 32.2 G/DL — SIGNIFICANT CHANGE UP (ref 32–36)
MCHC RBC-ENTMCNC: 32.2 G/DL — SIGNIFICANT CHANGE UP (ref 32–36)
MCV RBC AUTO: 82.5 FL — SIGNIFICANT CHANGE UP (ref 80–100)
MCV RBC AUTO: 82.5 FL — SIGNIFICANT CHANGE UP (ref 80–100)
METAMYELOCYTES # FLD: 0.5 % — HIGH (ref 0–0)
METAMYELOCYTES # FLD: 0.5 % — HIGH (ref 0–0)
MONOCYTES # BLD AUTO: 0.34 K/UL — SIGNIFICANT CHANGE UP (ref 0–0.9)
MONOCYTES # BLD AUTO: 0.34 K/UL — SIGNIFICANT CHANGE UP (ref 0–0.9)
MONOCYTES NFR BLD AUTO: 3 % — SIGNIFICANT CHANGE UP (ref 2–14)
MONOCYTES NFR BLD AUTO: 3 % — SIGNIFICANT CHANGE UP (ref 2–14)
MYELOCYTES NFR BLD: 10 % — HIGH (ref 0–0)
MYELOCYTES NFR BLD: 10 % — HIGH (ref 0–0)
NEUTROPHILS # BLD AUTO: 6.43 K/UL — SIGNIFICANT CHANGE UP (ref 1.8–7.4)
NEUTROPHILS # BLD AUTO: 6.43 K/UL — SIGNIFICANT CHANGE UP (ref 1.8–7.4)
NEUTROPHILS NFR BLD AUTO: 57 % — SIGNIFICANT CHANGE UP (ref 43–77)
NEUTROPHILS NFR BLD AUTO: 57 % — SIGNIFICANT CHANGE UP (ref 43–77)
NRBC # BLD: 7 /100 — HIGH (ref 0–0)
NRBC # BLD: 7 /100 — HIGH (ref 0–0)
NRBC # BLD: SIGNIFICANT CHANGE UP /100 WBCS (ref 0–0)
NRBC # BLD: SIGNIFICANT CHANGE UP /100 WBCS (ref 0–0)
PLAT MORPH BLD: ABNORMAL
PLAT MORPH BLD: ABNORMAL
PLATELET # BLD AUTO: 977 K/UL — HIGH (ref 150–400)
PLATELET # BLD AUTO: 977 K/UL — HIGH (ref 150–400)
POIKILOCYTOSIS BLD QL AUTO: SIGNIFICANT CHANGE UP
POIKILOCYTOSIS BLD QL AUTO: SIGNIFICANT CHANGE UP
POLYCHROMASIA BLD QL SMEAR: SLIGHT — SIGNIFICANT CHANGE UP
POLYCHROMASIA BLD QL SMEAR: SLIGHT — SIGNIFICANT CHANGE UP
RBC # BLD: 3.99 M/UL — LOW (ref 4.2–5.8)
RBC # BLD: 3.99 M/UL — LOW (ref 4.2–5.8)
RBC # FLD: 30.5 % — HIGH (ref 10.3–14.5)
RBC # FLD: 30.5 % — HIGH (ref 10.3–14.5)
RBC BLD AUTO: ABNORMAL
RBC BLD AUTO: ABNORMAL
SCHISTOCYTES BLD QL AUTO: SIGNIFICANT CHANGE UP
SCHISTOCYTES BLD QL AUTO: SIGNIFICANT CHANGE UP
TARGETS BLD QL SMEAR: SLIGHT — SIGNIFICANT CHANGE UP
TARGETS BLD QL SMEAR: SLIGHT — SIGNIFICANT CHANGE UP
WBC # BLD: 11.28 K/UL — HIGH (ref 3.8–10.5)
WBC # BLD: 11.28 K/UL — HIGH (ref 3.8–10.5)
WBC # FLD AUTO: 11.28 K/UL — HIGH (ref 3.8–10.5)
WBC # FLD AUTO: 11.28 K/UL — HIGH (ref 3.8–10.5)

## 2023-12-27 ENCOUNTER — RESULT REVIEW (OUTPATIENT)
Age: 73
End: 2023-12-27

## 2023-12-27 ENCOUNTER — APPOINTMENT (OUTPATIENT)
Dept: HEMATOLOGY ONCOLOGY | Facility: CLINIC | Age: 73
End: 2023-12-27

## 2023-12-27 LAB
ANISOCYTOSIS BLD QL: SLIGHT — SIGNIFICANT CHANGE UP
ANISOCYTOSIS BLD QL: SLIGHT — SIGNIFICANT CHANGE UP
BASOPHILS # BLD AUTO: 0 K/UL — SIGNIFICANT CHANGE UP (ref 0–0.2)
BASOPHILS # BLD AUTO: 0 K/UL — SIGNIFICANT CHANGE UP (ref 0–0.2)
BASOPHILS NFR BLD AUTO: 0 % — SIGNIFICANT CHANGE UP (ref 0–2)
BASOPHILS NFR BLD AUTO: 0 % — SIGNIFICANT CHANGE UP (ref 0–2)
BLASTS # FLD: 1 % — HIGH (ref 0–0)
BLASTS # FLD: 1 % — HIGH (ref 0–0)
DACRYOCYTES BLD QL SMEAR: SIGNIFICANT CHANGE UP
DACRYOCYTES BLD QL SMEAR: SIGNIFICANT CHANGE UP
ELLIPTOCYTES BLD QL SMEAR: SLIGHT — SIGNIFICANT CHANGE UP
ELLIPTOCYTES BLD QL SMEAR: SLIGHT — SIGNIFICANT CHANGE UP
EOSINOPHIL # BLD AUTO: 0.34 K/UL — SIGNIFICANT CHANGE UP (ref 0–0.5)
EOSINOPHIL # BLD AUTO: 0.34 K/UL — SIGNIFICANT CHANGE UP (ref 0–0.5)
EOSINOPHIL NFR BLD AUTO: 4 % — SIGNIFICANT CHANGE UP (ref 0–6)
EOSINOPHIL NFR BLD AUTO: 4 % — SIGNIFICANT CHANGE UP (ref 0–6)
GIANT PLATELETS BLD QL SMEAR: PRESENT — SIGNIFICANT CHANGE UP
GIANT PLATELETS BLD QL SMEAR: PRESENT — SIGNIFICANT CHANGE UP
HCT VFR BLD CALC: 30.6 % — LOW (ref 39–50)
HCT VFR BLD CALC: 30.6 % — LOW (ref 39–50)
HGB BLD-MCNC: 9.8 G/DL — LOW (ref 13–17)
HGB BLD-MCNC: 9.8 G/DL — LOW (ref 13–17)
HYPOCHROMIA BLD QL: SIGNIFICANT CHANGE UP
HYPOCHROMIA BLD QL: SIGNIFICANT CHANGE UP
LG PLATELETS BLD QL AUTO: SLIGHT — SIGNIFICANT CHANGE UP
LG PLATELETS BLD QL AUTO: SLIGHT — SIGNIFICANT CHANGE UP
LYMPHOCYTES # BLD AUTO: 1.68 K/UL — SIGNIFICANT CHANGE UP (ref 1–3.3)
LYMPHOCYTES # BLD AUTO: 1.68 K/UL — SIGNIFICANT CHANGE UP (ref 1–3.3)
LYMPHOCYTES # BLD AUTO: 20 % — SIGNIFICANT CHANGE UP (ref 13–44)
LYMPHOCYTES # BLD AUTO: 20 % — SIGNIFICANT CHANGE UP (ref 13–44)
MCHC RBC-ENTMCNC: 26.4 PG — LOW (ref 27–34)
MCHC RBC-ENTMCNC: 26.4 PG — LOW (ref 27–34)
MCHC RBC-ENTMCNC: 32 G/DL — SIGNIFICANT CHANGE UP (ref 32–36)
MCHC RBC-ENTMCNC: 32 G/DL — SIGNIFICANT CHANGE UP (ref 32–36)
MCV RBC AUTO: 82.5 FL — SIGNIFICANT CHANGE UP (ref 80–100)
MCV RBC AUTO: 82.5 FL — SIGNIFICANT CHANGE UP (ref 80–100)
MONOCYTES # BLD AUTO: 0.51 K/UL — SIGNIFICANT CHANGE UP (ref 0–0.9)
MONOCYTES # BLD AUTO: 0.51 K/UL — SIGNIFICANT CHANGE UP (ref 0–0.9)
MONOCYTES NFR BLD AUTO: 6 % — SIGNIFICANT CHANGE UP (ref 2–14)
MONOCYTES NFR BLD AUTO: 6 % — SIGNIFICANT CHANGE UP (ref 2–14)
MYELOCYTES NFR BLD: 8 % — HIGH (ref 0–0)
MYELOCYTES NFR BLD: 8 % — HIGH (ref 0–0)
NEUTROPHILS # BLD AUTO: 5.14 K/UL — SIGNIFICANT CHANGE UP (ref 1.8–7.4)
NEUTROPHILS # BLD AUTO: 5.14 K/UL — SIGNIFICANT CHANGE UP (ref 1.8–7.4)
NEUTROPHILS NFR BLD AUTO: 61 % — SIGNIFICANT CHANGE UP (ref 43–77)
NEUTROPHILS NFR BLD AUTO: 61 % — SIGNIFICANT CHANGE UP (ref 43–77)
NRBC # BLD: 6 /100 WBCS — HIGH (ref 0–0)
NRBC # BLD: 6 /100 WBCS — HIGH (ref 0–0)
NRBC # BLD: SIGNIFICANT CHANGE UP /100 WBCS (ref 0–0)
NRBC # BLD: SIGNIFICANT CHANGE UP /100 WBCS (ref 0–0)
PLAT MORPH BLD: ABNORMAL
PLAT MORPH BLD: ABNORMAL
PLATELET # BLD AUTO: 914 K/UL — HIGH (ref 150–400)
PLATELET # BLD AUTO: 914 K/UL — HIGH (ref 150–400)
POIKILOCYTOSIS BLD QL AUTO: SIGNIFICANT CHANGE UP
POIKILOCYTOSIS BLD QL AUTO: SIGNIFICANT CHANGE UP
POLYCHROMASIA BLD QL SMEAR: SLIGHT — SIGNIFICANT CHANGE UP
POLYCHROMASIA BLD QL SMEAR: SLIGHT — SIGNIFICANT CHANGE UP
RBC # BLD: 3.71 M/UL — LOW (ref 4.2–5.8)
RBC # BLD: 3.71 M/UL — LOW (ref 4.2–5.8)
RBC # FLD: 30.6 % — HIGH (ref 10.3–14.5)
RBC # FLD: 30.6 % — HIGH (ref 10.3–14.5)
RBC BLD AUTO: ABNORMAL
RBC BLD AUTO: ABNORMAL
SCHISTOCYTES BLD QL AUTO: SLIGHT — SIGNIFICANT CHANGE UP
SCHISTOCYTES BLD QL AUTO: SLIGHT — SIGNIFICANT CHANGE UP
TARGETS BLD QL SMEAR: SLIGHT — SIGNIFICANT CHANGE UP
TARGETS BLD QL SMEAR: SLIGHT — SIGNIFICANT CHANGE UP
WBC # BLD: 8.42 K/UL — SIGNIFICANT CHANGE UP (ref 3.8–10.5)
WBC # BLD: 8.42 K/UL — SIGNIFICANT CHANGE UP (ref 3.8–10.5)
WBC # FLD AUTO: 8.42 K/UL — SIGNIFICANT CHANGE UP (ref 3.8–10.5)
WBC # FLD AUTO: 8.42 K/UL — SIGNIFICANT CHANGE UP (ref 3.8–10.5)

## 2024-01-03 ENCOUNTER — RESULT REVIEW (OUTPATIENT)
Age: 74
End: 2024-01-03

## 2024-01-03 ENCOUNTER — APPOINTMENT (OUTPATIENT)
Dept: INFUSION THERAPY | Facility: HOSPITAL | Age: 74
End: 2024-01-03

## 2024-01-03 ENCOUNTER — APPOINTMENT (OUTPATIENT)
Dept: HEMATOLOGY ONCOLOGY | Facility: CLINIC | Age: 74
End: 2024-01-03
Payer: MEDICARE

## 2024-01-03 VITALS
OXYGEN SATURATION: 93 % | BODY MASS INDEX: 22.78 KG/M2 | RESPIRATION RATE: 16 BRPM | WEIGHT: 158.73 LBS | DIASTOLIC BLOOD PRESSURE: 79 MMHG | HEART RATE: 86 BPM | SYSTOLIC BLOOD PRESSURE: 152 MMHG | TEMPERATURE: 98 F

## 2024-01-03 DIAGNOSIS — D75.839 THROMBOCYTOSIS, UNSPECIFIED: ICD-10-CM

## 2024-01-03 LAB
ANISOCYTOSIS BLD QL: SIGNIFICANT CHANGE UP
ANISOCYTOSIS BLD QL: SIGNIFICANT CHANGE UP
BASOPHILS # BLD AUTO: 0 K/UL — SIGNIFICANT CHANGE UP (ref 0–0.2)
BASOPHILS # BLD AUTO: 0 K/UL — SIGNIFICANT CHANGE UP (ref 0–0.2)
BASOPHILS NFR BLD AUTO: 0 % — SIGNIFICANT CHANGE UP (ref 0–2)
BASOPHILS NFR BLD AUTO: 0 % — SIGNIFICANT CHANGE UP (ref 0–2)
BLASTS # FLD: 1 % — HIGH (ref 0–0)
BLASTS # FLD: 1 % — HIGH (ref 0–0)
DACRYOCYTES BLD QL SMEAR: SIGNIFICANT CHANGE UP
DACRYOCYTES BLD QL SMEAR: SIGNIFICANT CHANGE UP
ELLIPTOCYTES BLD QL SMEAR: SLIGHT — SIGNIFICANT CHANGE UP
ELLIPTOCYTES BLD QL SMEAR: SLIGHT — SIGNIFICANT CHANGE UP
EOSINOPHIL # BLD AUTO: 0.15 K/UL — SIGNIFICANT CHANGE UP (ref 0–0.5)
EOSINOPHIL # BLD AUTO: 0.15 K/UL — SIGNIFICANT CHANGE UP (ref 0–0.5)
EOSINOPHIL NFR BLD AUTO: 2.5 % — SIGNIFICANT CHANGE UP (ref 0–6)
EOSINOPHIL NFR BLD AUTO: 2.5 % — SIGNIFICANT CHANGE UP (ref 0–6)
GIANT PLATELETS BLD QL SMEAR: PRESENT — SIGNIFICANT CHANGE UP
GIANT PLATELETS BLD QL SMEAR: PRESENT — SIGNIFICANT CHANGE UP
HCT VFR BLD CALC: 30.9 % — LOW (ref 39–50)
HCT VFR BLD CALC: 30.9 % — LOW (ref 39–50)
HGB BLD-MCNC: 10.2 G/DL — LOW (ref 13–17)
HGB BLD-MCNC: 10.2 G/DL — LOW (ref 13–17)
LG PLATELETS BLD QL AUTO: SLIGHT — SIGNIFICANT CHANGE UP
LG PLATELETS BLD QL AUTO: SLIGHT — SIGNIFICANT CHANGE UP
LYMPHOCYTES # BLD AUTO: 1.28 K/UL — SIGNIFICANT CHANGE UP (ref 1–3.3)
LYMPHOCYTES # BLD AUTO: 1.28 K/UL — SIGNIFICANT CHANGE UP (ref 1–3.3)
LYMPHOCYTES # BLD AUTO: 21 % — SIGNIFICANT CHANGE UP (ref 13–44)
LYMPHOCYTES # BLD AUTO: 21 % — SIGNIFICANT CHANGE UP (ref 13–44)
MCHC RBC-ENTMCNC: 27.1 PG — SIGNIFICANT CHANGE UP (ref 27–34)
MCHC RBC-ENTMCNC: 27.1 PG — SIGNIFICANT CHANGE UP (ref 27–34)
MCHC RBC-ENTMCNC: 33 G/DL — SIGNIFICANT CHANGE UP (ref 32–36)
MCHC RBC-ENTMCNC: 33 G/DL — SIGNIFICANT CHANGE UP (ref 32–36)
MCV RBC AUTO: 82.2 FL — SIGNIFICANT CHANGE UP (ref 80–100)
MCV RBC AUTO: 82.2 FL — SIGNIFICANT CHANGE UP (ref 80–100)
METAMYELOCYTES # FLD: 2.5 % — HIGH (ref 0–0)
METAMYELOCYTES # FLD: 2.5 % — HIGH (ref 0–0)
MONOCYTES # BLD AUTO: 0.49 K/UL — SIGNIFICANT CHANGE UP (ref 0–0.9)
MONOCYTES # BLD AUTO: 0.49 K/UL — SIGNIFICANT CHANGE UP (ref 0–0.9)
MONOCYTES NFR BLD AUTO: 8 % — SIGNIFICANT CHANGE UP (ref 2–14)
MONOCYTES NFR BLD AUTO: 8 % — SIGNIFICANT CHANGE UP (ref 2–14)
MYELOCYTES NFR BLD: 3.5 % — HIGH (ref 0–0)
MYELOCYTES NFR BLD: 3.5 % — HIGH (ref 0–0)
NEUTROPHILS # BLD AUTO: 3.74 K/UL — SIGNIFICANT CHANGE UP (ref 1.8–7.4)
NEUTROPHILS # BLD AUTO: 3.74 K/UL — SIGNIFICANT CHANGE UP (ref 1.8–7.4)
NEUTROPHILS NFR BLD AUTO: 61.5 % — SIGNIFICANT CHANGE UP (ref 43–77)
NEUTROPHILS NFR BLD AUTO: 61.5 % — SIGNIFICANT CHANGE UP (ref 43–77)
NRBC # BLD: 33 /100 WBCS — HIGH (ref 0–0)
NRBC # BLD: 33 /100 WBCS — HIGH (ref 0–0)
NRBC # BLD: SIGNIFICANT CHANGE UP /100 WBCS (ref 0–0)
NRBC # BLD: SIGNIFICANT CHANGE UP /100 WBCS (ref 0–0)
PLAT MORPH BLD: ABNORMAL
PLAT MORPH BLD: ABNORMAL
PLATELET # BLD AUTO: 704 K/UL — HIGH (ref 150–400)
PLATELET # BLD AUTO: 704 K/UL — HIGH (ref 150–400)
POIKILOCYTOSIS BLD QL AUTO: SIGNIFICANT CHANGE UP
POIKILOCYTOSIS BLD QL AUTO: SIGNIFICANT CHANGE UP
POLYCHROMASIA BLD QL SMEAR: SLIGHT — SIGNIFICANT CHANGE UP
POLYCHROMASIA BLD QL SMEAR: SLIGHT — SIGNIFICANT CHANGE UP
RBC # BLD: 3.76 M/UL — LOW (ref 4.2–5.8)
RBC # BLD: 3.76 M/UL — LOW (ref 4.2–5.8)
RBC # FLD: 31.2 % — HIGH (ref 10.3–14.5)
RBC # FLD: 31.2 % — HIGH (ref 10.3–14.5)
RBC BLD AUTO: ABNORMAL
RBC BLD AUTO: ABNORMAL
SCHISTOCYTES BLD QL AUTO: SIGNIFICANT CHANGE UP
SCHISTOCYTES BLD QL AUTO: SIGNIFICANT CHANGE UP
TARGETS BLD QL SMEAR: SLIGHT — SIGNIFICANT CHANGE UP
TARGETS BLD QL SMEAR: SLIGHT — SIGNIFICANT CHANGE UP
WBC # BLD: 6.08 K/UL — SIGNIFICANT CHANGE UP (ref 3.8–10.5)
WBC # BLD: 6.08 K/UL — SIGNIFICANT CHANGE UP (ref 3.8–10.5)
WBC # FLD AUTO: 6.08 K/UL — SIGNIFICANT CHANGE UP (ref 3.8–10.5)
WBC # FLD AUTO: 6.08 K/UL — SIGNIFICANT CHANGE UP (ref 3.8–10.5)

## 2024-01-03 PROCEDURE — 99214 OFFICE O/P EST MOD 30 MIN: CPT

## 2024-01-05 PROBLEM — D75.839 THROMBOCYTHEMIA: Status: ACTIVE | Noted: 2022-01-28

## 2024-01-05 NOTE — END OF VISIT
[] : Fellow [FreeTextEntry3] : Patient seen with fellow who documented the visit. I reviewed and edited the note as needed. [Time Spent: ___ minutes] : I have spent [unfilled] minutes of time on the encounter.

## 2024-01-05 NOTE — RESULTS/DATA
[FreeTextEntry1] : 1/5/24 HGB improved Platelet count decreased 704K < 900K  12/13/23 WBC 5.96 Hgb 8.6 >> 9.5 >> 9.9 >> 10.4 Platelet 450k Pending CMP, LDH, Ferritin   11/15/2023 BCR Final Report  Accession Number: [id number] Date Collected: 11/08/2023 Date Received: 11/09/2023 Date Reported: 11/10/2023 15:40  Specimen: .Blood Indication:  CML/ALL  Test Performed: Quantitative BCR-ABL by real time RT-PCR ________________________________________________________________  RESULTS:   p210: Not Detected   p190: Positive  %BCR-ABL/ABL= >10.000 %   INTERPRETATION:  The specimen analyzed contains the translocation mBCR-ABL1 (p190) associated with Acute Lymphoblastic leukemia. Clinical correlation is recommended.   	 Patient:     BRUCE BAIN   Accession:                             [id number]  Collected Date/Time:                   10/16/2023 17:15 EDT Received Date/Time:                    10/16/2023 20:36 EDT  Hematopathology Report - Auth (Verified)  Specimen(s) Submitted 1  Left pic 2  Bone marrow aspirate  Final Diagnosis 1, 2. Bone marrow biopsy and bone marrow aspirate      - Myeloid neoplasm with concurrent BCR-ABL1 translocation, and  JAK2 and SF3B1 co-mutations and severe fibrosis  See note and description.  Diagnostic note: Per chart review, patient has normocytic anemia with folic acid deficiency which has been corrected now and thrombocytosis in the range of 470K to 600K since December 2021. His hemoglobin levels are trending down from 12.6g/dL to 8.1g/dL. This is the first  bone marrow biopsy and patient has never been treated. Biopsy shows overtly fibrotic marrow with maturing trilineage hematopoiesis, markedly increased megakaryopoiesis and dyserythropoiesis (ring sideroblasts). There is no increase in blast population. Karyotype analysis shows t(9;22)(q34;q11.2) in 9 out of 20 metaphases.  Molecular studies show  JAK2 p.Ukm441Bvj and   SF3B1 p.Cuj126Bxd mutation. Overall the    findings are consistent with myeloid neoplasm with concurrent BCR-ABL1 translocation, and  JAK2 and SF3B1 co-mutations and severe fibrosis. 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 However the detection of BCR/ABL1 translocation by chromosome analysis is a   diagnostic feature of chronic myeloid leukemia.  Co-occurance of  BCR-ABL1 and JAK2 V617F  mutations have been described in small case series. The frequency of co-occurrence, the temporal order of acquisition and clonal relationship of these alterations is poorly understood and it is also unclear for this case since there is no prior bone marrow  biopsy or diagnostic work up. Literature of these rare cases shows many patients progress  to myelofibrosis like our patient, raising the possibility that the two mutant tyrosine    kinases may accelerate disease progression.  Clinical correlation is recommended.  Case is discussed with Dr Jose Mayers via phone call on 10/27/2023 3:30 pm  Microscopic description: 1. Biopsy: Quality:  Small (0.9 cm) core biopsy Cellularity:  30% (patchy cellularity with hematopoiesis alternating with hypocellular   areas of fibrotic stroma)    Myeloid lineage:  Decreased in number, exhibits full    maturation    Erythroid lineage:  Decreased in number, exhibits full    maturation with increased pronormoblasts    Megakaryocytes:  Markedly increased in number, atypical    megakaryocytes  (some small some enlarged with    multilobated nuclei, some naked nuclei ) forming large    clusters in the marrow and also within dilated sinusoids    Blasts:  Not increased    Lymphocytes  Not increased in number, mostly small mature    looking lymphocytes are seen    Plasma cells:  Not increased    Stroma:   Marked fibrosis with dilated sinusoids and    intrasinusoidal hematopoiesis    Clot:   No marrow fragments     2. Aspirate:    Myeloid lineage:  Exhibits full maturation, no    significant dysplastic        changes seen     Erythroid lineage:  Exhibit full maturation with    increased pronormoblasts, no significant dysplastic    changes seen Megakaryocytes:  Rare Lymphocytes:  Not increased Plasma cells:   Not increased Blasts:    Not increased  Bone Marrow Asp Smear Diff Cell Count:  200 Cells. Type  Normal* % Blast  0-3 0% Promyelocyte  1-8 0% Myelocyte  10-15 3% Metamyelocyte  10-15 3% Band/Neutrophil  12-25  37% Eosinophil  1-5 1% Basophil  0-1 0% Pronormoblast  0-2 4% Normoblast  15-25 35% Monocyte  0-2 0% Lymphocyte  10-15 17% Plasma cell  0-1 0%   *Adult Range  Peripheral Blood Smear:   Not available  Ancillary studies Special stains on bone marrow aspirate: Iron stain:  No spicules are present to evaluate for iron stores. There is increase in ring sideroblasts (13%).  Special stains on core biopsy: Reticulin:  Diffuse and dense increase in reticulin with extensive intersections and coarse bundles of thick fibers consistent with collagen (MF=3) Trichrome:  Collagen fibrosis Iron:  Positive  Immunohistochemical stains:    CD34, , MPO, Factor VIII, CD71, E-cad, p53 stains are performed on block 1A  CD34:  Highlights vascular structures, no increase in blast population :  Highlights  scattered masts cells. No increase in blast population MPO:  Highlights myeloid series, decreased Factor VIII:  Highlights megakaryocytes, markedly increased, including occasional small forms CD71 : Highlights erythroid series, decreased E-cad:  Highlights early erythroid series, increased p53:  Normal staining pattern  Flow cytometry analysis (65-AJ-):    CD45/side scatter shows myeloblast population (1.21% of total) with normal antigen maturation pattern.  There is no increase in CD34, CD14 or  positive cells. Myeloid antigen pattern is normal with CD13, CD16 and CD11b. Monocytes show normal antigen maturation pattern Lymphocytes (11% of cells): Heterogeneous population of T-cells (with a  CD4 to CD8 ratio 2.31) with no aberrancy or loss of pan-T cell antigen, immunophenotypically unremarkable natural killer cells, and polytypic B-cells  Cytogenetics: Karyotype ([id number]) :   46,XY,t(9;22)(q34;q11.2)[9]/46,XY[11]  Nine of the twenty metaphases examined revealed a translocation involving the long arms of chromosomes 9 and 22 giving rise to the classic [place] chromosome.   The remaining metaphases had an apparently normal karyotype.  Please note that the Initial FISH studies showed normal results for BCR::ABL1 fusion however, subsequent g-banded karyotyping showed a translocation involving chromosomes 9 and 22, with a variant of the classic t(9;22)(q34;q11.2). Therefore, sequential FISH studies were performed using   A  dual-fusion three color probe for BCR and ABL1/ASS1, which showed an atypical signal pattern with A a single BCR::ABL1 fusion signal on alley(22) homologue,    one orange, one green and one aqua instead of two fusion, one orange, one green and two aqua.  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  Additionally, retrospective and repeat FISH studies using dual-fusion three color probe for BCR and ABL1/ASS1 in interphase cells still showed normal results due to the reason mentioned above.  Fluorescence in situ Hybridization ([id number], [id number], [id number]): NORMAL FISH - BCR::ABL1 NORMAL FISH - 5q NORMAL FISH - MPD PANEL  Probe(s) and Location(s): ASS1, ABL1 (9q34), BCR (22q11.2) ISCN Nomenclature: nuc zak(ASS1,ABL1,BCR)x2[194/200] Probe(s) and Location(s):   E7Y588/ D5S23 (5p15.2), EGR1 (5q31) ISCN Nomenclature:  nuc zak(K1B768/D5S23,EGR1)x2[198/200] Probe(s) and Location(s):   PDGFRA LSI 4q12 (4q12) ([facility] [employer]), PDGFRB (8v52-i25) ([facility]), FGFR1 (8p11.23-p11.2), CEP 8/D8Z2(8p11.1-q11.1) ([employer]) ISCN Nomenclature:  nuc zak(SCFD2,LNX,PDGFRA)x2[198/200],(PDGFRBx2)[197/200 ], (FGFR1,D8Z2)x2[198/200]  Molecular Studies: OnkoSit Advanced NGS Myeloid Panel  DETECTED GENOMIC ALTERATIONS:   Tier I: Variants of Strong Clinical Significance   JAK2 p.Czo670Fkt  Chr: 9   Pos: [id number]   Ref: G   Alt: T    Coverage: 792    Allele Freq: 46.34   cDNA change: c.1849G>T   Exon: 14   ClinVar ID: -    SF3B1 p.Jrw027Vsn   Chr: 2   Pos: [id number]   Ref: T   Alt: C   Coverage: 613   Allele Freq: 43.88   cDNA change: c.2098A>G   Exon: 15   ClinVar ID: -    Tier II: Variants of Potential Clinical Significance   ATRX p.Ccq2443BtfxcAzb1   Chr: X   Pos: [id number]   Ref: CT   Alt: C   Coverage: 673   Allele Freq: 5.65   cDNA change: c.6848del   Exon: 31   ClinVar ID: -  PERTINENT NEGATIVE RESULTS:   The following genes are NEGATIVE for clinically relevant mutations.   Mutational hotspots and surrounding exonic regions were interrogated   for DNA level point mutations and indels (fusions not assayed).   ABL1, ANKRD26, ASXL1, BCOR, BCORL1, BRAF, CALR, CBL, CCND2, CDKN2A,   CEBPA, CSF3R, CUX1, DDX41, DNMT3A, ETNK1, ETV6, EZH2, FBXW7, FLT3,   GATA2, HRAS, IDH1, IDH2, KDM6A, KIT, KMT2A, KRAS, MAP2K1, MPL,   MYD88, NF1, NPM1, NRAS, PDGFRA, PHF6, PTEN, PTPN11, RUNX1, SETBP1,   SRSF2, STAG2, TET2, TP53, U2AF1, WT1, ZRSR2  JAK2  V617F Activating Mutation Assay by Real-Time PCR ([id number]): Positive JAK2 V617F %: 39.7 %  Comment: Iron stain (examined to evaluate for iron stores, see microscopic description) and Giemsa stain (shows appropriate staining pattern) are performed on blocks 1A, 1B  Verified by: Michael Herman MD (Electronic Signature) Reported on: 11/03/23 12:39 EDT, [facility], [address] [facility] [place] [address] Phone: [phone]   Fax: [phone] _________________________________________________________________  Clinical Information *** The external document could not be loaded. ***   Gross Description 1. The specimen is received in Bouins fixative and the specimen container is labeled    "L Pic". It consists of one bone marrow core measuring 0.9 x 0.2 cm with a 0.8 x 0.8 x 0.1 cm aggregate of clotted blood. Entirely submitted in 2 cassettes:   1A: Bone marrow core   1B: Blood clot  2. Two Barba-Giemsa and one iron stained bone marrow aspirate smears are submitted.  Tia CANTU 10/17/2023 06:36 AM.  Disclaimer In addition to other data that may appear on the specimen containers, all labels have been inspected to confirm the presence of the patients name and date of birth.  Histological Processing Performed at [facility], Department of Pathology, [address] [place] [address].    10/11/2023 Normocytic anemia - Hgb 8.5 Thrombocytosis Plt 573 Manual differential reportedly showed 2-3% blasts    2/4/2022  Available labs reviewed, relevant data as below: Anemia, Hgb 8.6, normal MCV (84); no retic response, Normal iron studies, high ferritin (485),  Low Folic acid  (3.3), normal  B12 Thrombocytosis (506).   LDH high at ~750  Haptoglobin normal range.

## 2024-01-05 NOTE — ASSESSMENT
[FreeTextEntry1] : 71-year-old Mr. BAIN is seen in follow up for anemia, thrombocytosis and rule out myelofibrosis. Patient was initially seen more than a year ago (02/2022). He was and continues to be completely asymptomatic. His anemia was normocytic, he has had low folic acid that normalized after folate supplementation. Noted to have mild high ferritin and persistent moderately high LDH in the absence evidence of hemolysis. He did not have splenomegaly on physical exam (02/2022). Intramedullary hemolysis may have happened due to folate deficiency; However, a primary bone marrow process like PMF could not be ruled out. This year (10/2023) he returned for further w/u.  He underwent a bone marrow biopsy. Results are interesting:  Bone marrow biopsy and bone marrow aspirate - Myeloid neoplasm with concurrent BCR-ABL1 translocation, and JAK2 and SF3B1 co-mutations and severe fibrosis, dyserythropoiesis (ring sideroblasts), markedly increased in megakaryocytes, atypical megakaryocytes  (some small some enlarged with multi-lobated nuclei, some naked nuclei) no excess blasts.   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 Mayers

## 2024-01-05 NOTE — CONSULT LETTER
[FreeTextEntry3] : Jose Mayers MD, PhD, MS \par  Attending Physician \par  Hematology-Oncology \par  [facility] [[email or web address]]\par

## 2024-01-05 NOTE — HISTORY OF PRESENT ILLNESS
[de-identified] : 71 year-old Mr. BAIN is seen in consult for "anemia, thrombocytosis, r/o myelofibrosis". Patient states that since Sept 2021 he has been feeling like flies crawling inside his head when he is smelling food or eating; then the flies crawl to his legs. He went to his PCP for this symptom where after a lot of blood work he was found to be anemic, folate deficient. He has been on folic acid since mid January. Lab work also showed thrombocytosis (506). His Hgb was 8.6, MCV normal range, no  retic response, normal iron studies, high ferritin (485), normal  B12. Of note LDH was high at ~775, haptoglobin was normal range (~75). The patient denies any fatigue, weight loss, night sweats, fever. He has no complaints other than the "flies" which have long been abundant in his apartment and now they are crawling in his brain. Of note, patient has no other medical condition and he exercises regularly.   [de-identified] : 10/11/2023 Patient presents for a follow up. He was initially seen in 02/2022. He never returned for a follow up. He has done well in the interim. Denies any major change in his health status. He returned for a follow up as his PCP insisted him so. He admits to some fatigue. Denies any weight loss or early satiety. He denies any abnormal bleeding or thrombosis.   11/145/2023 Patient returns for a follow up and discuss the lab and biopsy results. He endorses no changes in his health status. He had a bone marrow biopsy done in the interim. most of the results are now available. Detailed results are copied in the Results section.   12/13/23 Patient seen in follow up. Since previous visit, he reported that he has not picked up Jakafi or Gleevec yet.. Re-iterated to the pt how important it is to start his medications ASAP to control his disease. He has started Luspatercept and responding well. Today his Hgb is 10.4! Denies fevers, night sweats, unintentional weight loss. No changes in health. Good appetite, stable weight   1/3/23 Patient seen in follow up. Started Jakafi and Gleevec, on Luspatercept plan for treatment today. Hgb today 10.2, platelets 704 (improvement from prior level of 900s). Patient denies any joint pains, no recent infections, denies fevers, night sweats, unintentional weight loss. No changes in health. Good appetite, stable weight.

## 2024-01-10 ENCOUNTER — RESULT REVIEW (OUTPATIENT)
Age: 74
End: 2024-01-10

## 2024-01-10 ENCOUNTER — APPOINTMENT (OUTPATIENT)
Dept: HEMATOLOGY ONCOLOGY | Facility: CLINIC | Age: 74
End: 2024-01-10

## 2024-01-10 LAB
ANISOCYTOSIS BLD QL: SIGNIFICANT CHANGE UP
ANISOCYTOSIS BLD QL: SIGNIFICANT CHANGE UP
BASOPHILS # BLD AUTO: 0 K/UL — SIGNIFICANT CHANGE UP (ref 0–0.2)
BASOPHILS # BLD AUTO: 0 K/UL — SIGNIFICANT CHANGE UP (ref 0–0.2)
BASOPHILS NFR BLD AUTO: 0 % — SIGNIFICANT CHANGE UP (ref 0–2)
BASOPHILS NFR BLD AUTO: 0 % — SIGNIFICANT CHANGE UP (ref 0–2)
DACRYOCYTES BLD QL SMEAR: SIGNIFICANT CHANGE UP
DACRYOCYTES BLD QL SMEAR: SIGNIFICANT CHANGE UP
ELLIPTOCYTES BLD QL SMEAR: SIGNIFICANT CHANGE UP
ELLIPTOCYTES BLD QL SMEAR: SIGNIFICANT CHANGE UP
EOSINOPHIL # BLD AUTO: 0.21 K/UL — SIGNIFICANT CHANGE UP (ref 0–0.5)
EOSINOPHIL # BLD AUTO: 0.21 K/UL — SIGNIFICANT CHANGE UP (ref 0–0.5)
EOSINOPHIL NFR BLD AUTO: 3 % — SIGNIFICANT CHANGE UP (ref 0–6)
EOSINOPHIL NFR BLD AUTO: 3 % — SIGNIFICANT CHANGE UP (ref 0–6)
GIANT PLATELETS BLD QL SMEAR: PRESENT — SIGNIFICANT CHANGE UP
GIANT PLATELETS BLD QL SMEAR: PRESENT — SIGNIFICANT CHANGE UP
HCT VFR BLD CALC: 33.1 % — LOW (ref 39–50)
HCT VFR BLD CALC: 33.1 % — LOW (ref 39–50)
HGB BLD-MCNC: 10.5 G/DL — LOW (ref 13–17)
HGB BLD-MCNC: 10.5 G/DL — LOW (ref 13–17)
LG PLATELETS BLD QL AUTO: SLIGHT — SIGNIFICANT CHANGE UP
LG PLATELETS BLD QL AUTO: SLIGHT — SIGNIFICANT CHANGE UP
LYMPHOCYTES # BLD AUTO: 1.59 K/UL — SIGNIFICANT CHANGE UP (ref 1–3.3)
LYMPHOCYTES # BLD AUTO: 1.59 K/UL — SIGNIFICANT CHANGE UP (ref 1–3.3)
LYMPHOCYTES # BLD AUTO: 23 % — SIGNIFICANT CHANGE UP (ref 13–44)
LYMPHOCYTES # BLD AUTO: 23 % — SIGNIFICANT CHANGE UP (ref 13–44)
MCHC RBC-ENTMCNC: 26.8 PG — LOW (ref 27–34)
MCHC RBC-ENTMCNC: 26.8 PG — LOW (ref 27–34)
MCHC RBC-ENTMCNC: 31.7 G/DL — LOW (ref 32–36)
MCHC RBC-ENTMCNC: 31.7 G/DL — LOW (ref 32–36)
MCV RBC AUTO: 84.4 FL — SIGNIFICANT CHANGE UP (ref 80–100)
MCV RBC AUTO: 84.4 FL — SIGNIFICANT CHANGE UP (ref 80–100)
METAMYELOCYTES # FLD: 1 % — HIGH (ref 0–0)
METAMYELOCYTES # FLD: 1 % — HIGH (ref 0–0)
MONOCYTES # BLD AUTO: 0.35 K/UL — SIGNIFICANT CHANGE UP (ref 0–0.9)
MONOCYTES # BLD AUTO: 0.35 K/UL — SIGNIFICANT CHANGE UP (ref 0–0.9)
MONOCYTES NFR BLD AUTO: 5 % — SIGNIFICANT CHANGE UP (ref 2–14)
MONOCYTES NFR BLD AUTO: 5 % — SIGNIFICANT CHANGE UP (ref 2–14)
MYELOCYTES NFR BLD: 4 % — HIGH (ref 0–0)
MYELOCYTES NFR BLD: 4 % — HIGH (ref 0–0)
NEUTROPHILS # BLD AUTO: 4.42 K/UL — SIGNIFICANT CHANGE UP (ref 1.8–7.4)
NEUTROPHILS # BLD AUTO: 4.42 K/UL — SIGNIFICANT CHANGE UP (ref 1.8–7.4)
NEUTROPHILS NFR BLD AUTO: 64 % — SIGNIFICANT CHANGE UP (ref 43–77)
NEUTROPHILS NFR BLD AUTO: 64 % — SIGNIFICANT CHANGE UP (ref 43–77)
NRBC # BLD: 16 /100 WBCS — HIGH (ref 0–0)
NRBC # BLD: 16 /100 WBCS — HIGH (ref 0–0)
NRBC # BLD: SIGNIFICANT CHANGE UP /100 WBCS (ref 0–0)
NRBC # BLD: SIGNIFICANT CHANGE UP /100 WBCS (ref 0–0)
PLAT MORPH BLD: ABNORMAL
PLAT MORPH BLD: ABNORMAL
PLATELET # BLD AUTO: 697 K/UL — HIGH (ref 150–400)
PLATELET # BLD AUTO: 697 K/UL — HIGH (ref 150–400)
POIKILOCYTOSIS BLD QL AUTO: SIGNIFICANT CHANGE UP
POIKILOCYTOSIS BLD QL AUTO: SIGNIFICANT CHANGE UP
POLYCHROMASIA BLD QL SMEAR: SLIGHT — SIGNIFICANT CHANGE UP
POLYCHROMASIA BLD QL SMEAR: SLIGHT — SIGNIFICANT CHANGE UP
RBC # BLD: 3.92 M/UL — LOW (ref 4.2–5.8)
RBC # BLD: 3.92 M/UL — LOW (ref 4.2–5.8)
RBC # FLD: 31.5 % — HIGH (ref 10.3–14.5)
RBC # FLD: 31.5 % — HIGH (ref 10.3–14.5)
RBC BLD AUTO: ABNORMAL
RBC BLD AUTO: ABNORMAL
SCHISTOCYTES BLD QL AUTO: SLIGHT — SIGNIFICANT CHANGE UP
SCHISTOCYTES BLD QL AUTO: SLIGHT — SIGNIFICANT CHANGE UP
TARGETS BLD QL SMEAR: SLIGHT — SIGNIFICANT CHANGE UP
TARGETS BLD QL SMEAR: SLIGHT — SIGNIFICANT CHANGE UP
WBC # BLD: 6.91 K/UL — SIGNIFICANT CHANGE UP (ref 3.8–10.5)
WBC # BLD: 6.91 K/UL — SIGNIFICANT CHANGE UP (ref 3.8–10.5)
WBC # FLD AUTO: 6.91 K/UL — SIGNIFICANT CHANGE UP (ref 3.8–10.5)
WBC # FLD AUTO: 6.91 K/UL — SIGNIFICANT CHANGE UP (ref 3.8–10.5)

## 2024-01-11 LAB
ALBUMIN SERPL ELPH-MCNC: 4.3 G/DL
ALP BLD-CCNC: 80 U/L
ALT SERPL-CCNC: 19 U/L
ANION GAP SERPL CALC-SCNC: 12 MMOL/L
AST SERPL-CCNC: 20 U/L
BILIRUB SERPL-MCNC: 1 MG/DL
BUN SERPL-MCNC: 12 MG/DL
CALCIUM SERPL-MCNC: 8.5 MG/DL
CHLORIDE SERPL-SCNC: 107 MMOL/L
CO2 SERPL-SCNC: 24 MMOL/L
CREAT SERPL-MCNC: 0.93 MG/DL
EGFR: 86 ML/MIN/1.73M2
GLUCOSE SERPL-MCNC: 155 MG/DL
LDH SERPL-CCNC: 816 U/L
POTASSIUM SERPL-SCNC: 5.1 MMOL/L
PROT SERPL-MCNC: 6.6 G/DL
SODIUM SERPL-SCNC: 143 MMOL/L
URATE SERPL-MCNC: 9 MG/DL

## 2024-01-17 ENCOUNTER — APPOINTMENT (OUTPATIENT)
Dept: HEMATOLOGY ONCOLOGY | Facility: CLINIC | Age: 74
End: 2024-01-17

## 2024-01-17 ENCOUNTER — RESULT REVIEW (OUTPATIENT)
Age: 74
End: 2024-01-17

## 2024-01-17 LAB
ANISOCYTOSIS BLD QL: SIGNIFICANT CHANGE UP
BASOPHILS # BLD AUTO: 0.1 K/UL — SIGNIFICANT CHANGE UP (ref 0–0.2)
BASOPHILS NFR BLD AUTO: 1 % — SIGNIFICANT CHANGE UP (ref 0–2)
BLASTS # FLD: 1 % — HIGH (ref 0–0)
DACRYOCYTES BLD QL SMEAR: SIGNIFICANT CHANGE UP
ELLIPTOCYTES BLD QL SMEAR: SLIGHT — SIGNIFICANT CHANGE UP
EOSINOPHIL # BLD AUTO: 0.1 K/UL — SIGNIFICANT CHANGE UP (ref 0–0.5)
EOSINOPHIL NFR BLD AUTO: 1 % — SIGNIFICANT CHANGE UP (ref 0–6)
GIANT PLATELETS BLD QL SMEAR: PRESENT — SIGNIFICANT CHANGE UP
HCT VFR BLD CALC: 33.8 % — LOW (ref 39–50)
HGB BLD-MCNC: 10.8 G/DL — LOW (ref 13–17)
LG PLATELETS BLD QL AUTO: SIGNIFICANT CHANGE UP
LYMPHOCYTES # BLD AUTO: 1.43 K/UL — SIGNIFICANT CHANGE UP (ref 1–3.3)
LYMPHOCYTES # BLD AUTO: 14 % — SIGNIFICANT CHANGE UP (ref 13–44)
MCHC RBC-ENTMCNC: 26.5 PG — LOW (ref 27–34)
MCHC RBC-ENTMCNC: 32 G/DL — SIGNIFICANT CHANGE UP (ref 32–36)
MCV RBC AUTO: 83 FL — SIGNIFICANT CHANGE UP (ref 80–100)
METAMYELOCYTES # FLD: 1 % — HIGH (ref 0–0)
MONOCYTES # BLD AUTO: 0.77 K/UL — SIGNIFICANT CHANGE UP (ref 0–0.9)
MONOCYTES NFR BLD AUTO: 7.5 % — SIGNIFICANT CHANGE UP (ref 2–14)
MYELOCYTES NFR BLD: 1.5 % — HIGH (ref 0–0)
NEUTROPHILS # BLD AUTO: 7.47 K/UL — HIGH (ref 1.8–7.4)
NEUTROPHILS NFR BLD AUTO: 73 % — SIGNIFICANT CHANGE UP (ref 43–77)
NRBC # BLD: 7 /100 WBCS — HIGH (ref 0–0)
NRBC # BLD: SIGNIFICANT CHANGE UP /100 WBCS (ref 0–0)
PLAT MORPH BLD: ABNORMAL
PLATELET # BLD AUTO: 922 K/UL — HIGH (ref 150–400)
POIKILOCYTOSIS BLD QL AUTO: SIGNIFICANT CHANGE UP
POLYCHROMASIA BLD QL SMEAR: SLIGHT — SIGNIFICANT CHANGE UP
RBC # BLD: 4.07 M/UL — LOW (ref 4.2–5.8)
RBC # FLD: 31.7 % — HIGH (ref 10.3–14.5)
RBC BLD AUTO: ABNORMAL
SCHISTOCYTES BLD QL AUTO: SIGNIFICANT CHANGE UP
TARGETS BLD QL SMEAR: SLIGHT — SIGNIFICANT CHANGE UP
WBC # BLD: 10.23 K/UL — SIGNIFICANT CHANGE UP (ref 3.8–10.5)
WBC # FLD AUTO: 10.23 K/UL — SIGNIFICANT CHANGE UP (ref 3.8–10.5)

## 2024-01-24 ENCOUNTER — RESULT REVIEW (OUTPATIENT)
Age: 74
End: 2024-01-24

## 2024-01-24 ENCOUNTER — NON-APPOINTMENT (OUTPATIENT)
Age: 74
End: 2024-01-24

## 2024-01-24 ENCOUNTER — APPOINTMENT (OUTPATIENT)
Dept: HEMATOLOGY ONCOLOGY | Facility: CLINIC | Age: 74
End: 2024-01-24

## 2024-01-24 ENCOUNTER — OUTPATIENT (OUTPATIENT)
Dept: OUTPATIENT SERVICES | Facility: HOSPITAL | Age: 74
LOS: 1 days | Discharge: HOME | End: 2024-01-24

## 2024-01-24 ENCOUNTER — APPOINTMENT (OUTPATIENT)
Dept: INFUSION THERAPY | Facility: HOSPITAL | Age: 74
End: 2024-01-24

## 2024-01-24 DIAGNOSIS — D69.6 THROMBOCYTOPENIA, UNSPECIFIED: ICD-10-CM

## 2024-01-24 LAB
ANISOCYTOSIS BLD QL: SIGNIFICANT CHANGE UP
BASOPHILS # BLD AUTO: 0 K/UL — SIGNIFICANT CHANGE UP (ref 0–0.2)
BASOPHILS NFR BLD AUTO: 0 % — SIGNIFICANT CHANGE UP (ref 0–2)
BLASTS # FLD: 1 % — HIGH (ref 0–0)
DACRYOCYTES BLD QL SMEAR: SIGNIFICANT CHANGE UP
ELLIPTOCYTES BLD QL SMEAR: SLIGHT — SIGNIFICANT CHANGE UP
EOSINOPHIL # BLD AUTO: 0.11 K/UL — SIGNIFICANT CHANGE UP (ref 0–0.5)
EOSINOPHIL NFR BLD AUTO: 1 % — SIGNIFICANT CHANGE UP (ref 0–6)
GIANT PLATELETS BLD QL SMEAR: PRESENT — SIGNIFICANT CHANGE UP
HCT VFR BLD CALC: 30.6 % — LOW (ref 39–50)
HGB BLD-MCNC: 9.9 G/DL — LOW (ref 13–17)
HYPOCHROMIA BLD QL: SLIGHT — SIGNIFICANT CHANGE UP
LG PLATELETS BLD QL AUTO: SIGNIFICANT CHANGE UP
LYMPHOCYTES # BLD AUTO: 1.72 K/UL — SIGNIFICANT CHANGE UP (ref 1–3.3)
LYMPHOCYTES # BLD AUTO: 16 % — SIGNIFICANT CHANGE UP (ref 13–44)
MCHC RBC-ENTMCNC: 26.7 PG — LOW (ref 27–34)
MCHC RBC-ENTMCNC: 32.4 G/DL — SIGNIFICANT CHANGE UP (ref 32–36)
MCV RBC AUTO: 82.5 FL — SIGNIFICANT CHANGE UP (ref 80–100)
MONOCYTES # BLD AUTO: 1.08 K/UL — HIGH (ref 0–0.9)
MONOCYTES NFR BLD AUTO: 10 % — SIGNIFICANT CHANGE UP (ref 2–14)
MYELOCYTES NFR BLD: 2 % — HIGH (ref 0–0)
NEUTROPHILS # BLD AUTO: 7.53 K/UL — HIGH (ref 1.8–7.4)
NEUTROPHILS NFR BLD AUTO: 70 % — SIGNIFICANT CHANGE UP (ref 43–77)
NRBC # BLD: 6 /100 WBCS — HIGH (ref 0–0)
NRBC # BLD: SIGNIFICANT CHANGE UP /100 WBCS (ref 0–0)
PLAT MORPH BLD: ABNORMAL
PLATELET # BLD AUTO: 1212 K/UL — CRITICAL HIGH (ref 150–400)
POIKILOCYTOSIS BLD QL AUTO: SIGNIFICANT CHANGE UP
POLYCHROMASIA BLD QL SMEAR: SLIGHT — SIGNIFICANT CHANGE UP
RBC # BLD: 3.71 M/UL — LOW (ref 4.2–5.8)
RBC # FLD: 31.8 % — HIGH (ref 10.3–14.5)
RBC BLD AUTO: ABNORMAL
SCHISTOCYTES BLD QL AUTO: SIGNIFICANT CHANGE UP
TARGETS BLD QL SMEAR: SLIGHT — SIGNIFICANT CHANGE UP
WBC # BLD: 10.75 K/UL — HIGH (ref 3.8–10.5)
WBC # FLD AUTO: 10.75 K/UL — HIGH (ref 3.8–10.5)

## 2024-01-31 ENCOUNTER — NON-APPOINTMENT (OUTPATIENT)
Age: 74
End: 2024-01-31

## 2024-01-31 ENCOUNTER — RESULT REVIEW (OUTPATIENT)
Age: 74
End: 2024-01-31

## 2024-01-31 ENCOUNTER — APPOINTMENT (OUTPATIENT)
Dept: HEMATOLOGY ONCOLOGY | Facility: CLINIC | Age: 74
End: 2024-01-31

## 2024-01-31 LAB
ANISOCYTOSIS BLD QL: SIGNIFICANT CHANGE UP
BASOPHILS # BLD AUTO: 0.18 K/UL — SIGNIFICANT CHANGE UP (ref 0–0.2)
BASOPHILS NFR BLD AUTO: 2 % — SIGNIFICANT CHANGE UP (ref 0–2)
DACRYOCYTES BLD QL SMEAR: SIGNIFICANT CHANGE UP
ELLIPTOCYTES BLD QL SMEAR: SLIGHT — SIGNIFICANT CHANGE UP
EOSINOPHIL # BLD AUTO: 0.35 K/UL — SIGNIFICANT CHANGE UP (ref 0–0.5)
EOSINOPHIL NFR BLD AUTO: 4 % — SIGNIFICANT CHANGE UP (ref 0–6)
GIANT PLATELETS BLD QL SMEAR: PRESENT — SIGNIFICANT CHANGE UP
HCT VFR BLD CALC: 29.4 % — LOW (ref 39–50)
HGB BLD-MCNC: 9.7 G/DL — LOW (ref 13–17)
HYPOCHROMIA BLD QL: SLIGHT — SIGNIFICANT CHANGE UP
LG PLATELETS BLD QL AUTO: SIGNIFICANT CHANGE UP
LYMPHOCYTES # BLD AUTO: 1.31 K/UL — SIGNIFICANT CHANGE UP (ref 1–3.3)
LYMPHOCYTES # BLD AUTO: 15 % — SIGNIFICANT CHANGE UP (ref 13–44)
MACROCYTES BLD QL: SLIGHT — SIGNIFICANT CHANGE UP
MCHC RBC-ENTMCNC: 27.1 PG — SIGNIFICANT CHANGE UP (ref 27–34)
MCHC RBC-ENTMCNC: 33 G/DL — SIGNIFICANT CHANGE UP (ref 32–36)
MCV RBC AUTO: 82.1 FL — SIGNIFICANT CHANGE UP (ref 80–100)
METAMYELOCYTES # FLD: 2 % — HIGH (ref 0–0)
MONOCYTES # BLD AUTO: 0.53 K/UL — SIGNIFICANT CHANGE UP (ref 0–0.9)
MONOCYTES NFR BLD AUTO: 6 % — SIGNIFICANT CHANGE UP (ref 2–14)
MYELOCYTES NFR BLD: 2 % — HIGH (ref 0–0)
NEUTROPHILS # BLD AUTO: 6.04 K/UL — SIGNIFICANT CHANGE UP (ref 1.8–7.4)
NEUTROPHILS NFR BLD AUTO: 69 % — SIGNIFICANT CHANGE UP (ref 43–77)
NRBC # BLD: 5 /100 WBCS — HIGH (ref 0–0)
NRBC # BLD: SIGNIFICANT CHANGE UP /100 WBCS (ref 0–0)
PLAT MORPH BLD: ABNORMAL
PLATELET # BLD AUTO: 1202 K/UL — CRITICAL HIGH (ref 150–400)
POIKILOCYTOSIS BLD QL AUTO: SIGNIFICANT CHANGE UP
POLYCHROMASIA BLD QL SMEAR: SLIGHT — SIGNIFICANT CHANGE UP
RBC # BLD: 3.58 M/UL — LOW (ref 4.2–5.8)
RBC # FLD: 31.8 % — HIGH (ref 10.3–14.5)
RBC BLD AUTO: ABNORMAL
SCHISTOCYTES BLD QL AUTO: SIGNIFICANT CHANGE UP
TARGETS BLD QL SMEAR: SLIGHT — SIGNIFICANT CHANGE UP
WBC # BLD: 8.76 K/UL — SIGNIFICANT CHANGE UP (ref 3.8–10.5)
WBC # FLD AUTO: 8.76 K/UL — SIGNIFICANT CHANGE UP (ref 3.8–10.5)

## 2024-02-02 ENCOUNTER — NON-APPOINTMENT (OUTPATIENT)
Age: 74
End: 2024-02-02

## 2024-02-02 RX ORDER — ANAGRELIDE 0.5 MG/1
0.5 CAPSULE ORAL TWICE DAILY
Qty: 60 | Refills: 1 | Status: COMPLETED | COMMUNITY
Start: 2024-01-25 | End: 2024-02-02

## 2024-02-14 ENCOUNTER — NON-APPOINTMENT (OUTPATIENT)
Age: 74
End: 2024-02-14

## 2024-02-14 ENCOUNTER — APPOINTMENT (OUTPATIENT)
Dept: HEMATOLOGY ONCOLOGY | Facility: CLINIC | Age: 74
End: 2024-02-14

## 2024-02-14 ENCOUNTER — APPOINTMENT (OUTPATIENT)
Dept: INFUSION THERAPY | Facility: HOSPITAL | Age: 74
End: 2024-02-14

## 2024-02-14 ENCOUNTER — APPOINTMENT (OUTPATIENT)
Dept: OPHTHALMOLOGY | Facility: CLINIC | Age: 74
End: 2024-02-14
Payer: MEDICARE

## 2024-02-14 PROCEDURE — 92083 EXTENDED VISUAL FIELD XM: CPT

## 2024-02-14 PROCEDURE — 92012 INTRM OPH EXAM EST PATIENT: CPT

## 2024-03-06 ENCOUNTER — RESULT REVIEW (OUTPATIENT)
Age: 74
End: 2024-03-06

## 2024-03-06 ENCOUNTER — APPOINTMENT (OUTPATIENT)
Dept: INFUSION THERAPY | Facility: HOSPITAL | Age: 74
End: 2024-03-06

## 2024-03-06 ENCOUNTER — APPOINTMENT (OUTPATIENT)
Dept: HEMATOLOGY ONCOLOGY | Facility: CLINIC | Age: 74
End: 2024-03-06

## 2024-03-06 LAB
ANISOCYTOSIS BLD QL: SIGNIFICANT CHANGE UP
BASOPHILS # BLD AUTO: 0 K/UL — SIGNIFICANT CHANGE UP (ref 0–0.2)
BASOPHILS NFR BLD AUTO: 0 % — SIGNIFICANT CHANGE UP (ref 0–2)
BLASTS # FLD: 1 % — HIGH (ref 0–0)
DACRYOCYTES BLD QL SMEAR: SIGNIFICANT CHANGE UP
ELLIPTOCYTES BLD QL SMEAR: SLIGHT — SIGNIFICANT CHANGE UP
EOSINOPHIL # BLD AUTO: 0.11 K/UL — SIGNIFICANT CHANGE UP (ref 0–0.5)
EOSINOPHIL NFR BLD AUTO: 2 % — SIGNIFICANT CHANGE UP (ref 0–6)
GIANT PLATELETS BLD QL SMEAR: PRESENT — SIGNIFICANT CHANGE UP
HCT VFR BLD CALC: 26.2 % — LOW (ref 39–50)
HGB BLD-MCNC: 8.6 G/DL — LOW (ref 13–17)
HYPOCHROMIA BLD QL: SLIGHT — SIGNIFICANT CHANGE UP
LG PLATELETS BLD QL AUTO: SLIGHT — SIGNIFICANT CHANGE UP
LYMPHOCYTES # BLD AUTO: 1.37 K/UL — SIGNIFICANT CHANGE UP (ref 1–3.3)
LYMPHOCYTES # BLD AUTO: 26 % — SIGNIFICANT CHANGE UP (ref 13–44)
MACROCYTES BLD QL: SLIGHT — SIGNIFICANT CHANGE UP
MCHC RBC-ENTMCNC: 27.6 PG — SIGNIFICANT CHANGE UP (ref 27–34)
MCHC RBC-ENTMCNC: 32.8 G/DL — SIGNIFICANT CHANGE UP (ref 32–36)
MCV RBC AUTO: 84 FL — SIGNIFICANT CHANGE UP (ref 80–100)
METAMYELOCYTES # FLD: 1 % — HIGH (ref 0–0)
MONOCYTES # BLD AUTO: 0.95 K/UL — HIGH (ref 0–0.9)
MONOCYTES NFR BLD AUTO: 18 % — HIGH (ref 2–14)
MYELOCYTES NFR BLD: 1 % — HIGH (ref 0–0)
NEUTROPHILS # BLD AUTO: 2.68 K/UL — SIGNIFICANT CHANGE UP (ref 1.8–7.4)
NEUTROPHILS NFR BLD AUTO: 51 % — SIGNIFICANT CHANGE UP (ref 43–77)
NRBC # BLD: 29 /100 WBCS — HIGH (ref 0–0)
NRBC # BLD: SIGNIFICANT CHANGE UP /100 WBCS (ref 0–0)
PLAT MORPH BLD: ABNORMAL
PLATELET # BLD AUTO: 528 K/UL — HIGH (ref 150–400)
POIKILOCYTOSIS BLD QL AUTO: SIGNIFICANT CHANGE UP
POLYCHROMASIA BLD QL SMEAR: SLIGHT — SIGNIFICANT CHANGE UP
RBC # BLD: 3.12 M/UL — LOW (ref 4.2–5.8)
RBC # FLD: 32 % — HIGH (ref 10.3–14.5)
RBC BLD AUTO: ABNORMAL
SCHISTOCYTES BLD QL AUTO: SIGNIFICANT CHANGE UP
TARGETS BLD QL SMEAR: SLIGHT — SIGNIFICANT CHANGE UP
WBC # BLD: 5.26 K/UL — SIGNIFICANT CHANGE UP (ref 3.8–10.5)
WBC # FLD AUTO: 5.26 K/UL — SIGNIFICANT CHANGE UP (ref 3.8–10.5)

## 2024-03-18 ENCOUNTER — OUTPATIENT (OUTPATIENT)
Dept: OUTPATIENT SERVICES | Facility: HOSPITAL | Age: 74
LOS: 1 days | Discharge: HOME | End: 2024-03-18

## 2024-03-18 DIAGNOSIS — D69.6 THROMBOCYTOPENIA, UNSPECIFIED: ICD-10-CM

## 2024-03-22 ENCOUNTER — APPOINTMENT (OUTPATIENT)
Dept: HEMATOLOGY ONCOLOGY | Facility: CLINIC | Age: 74
End: 2024-03-22

## 2024-03-22 RX ORDER — HYDROXYUREA 500 MG/1
500 CAPSULE ORAL
Qty: 60 | Refills: 0 | Status: DISCONTINUED | COMMUNITY
Start: 2024-01-24 | End: 2024-03-22

## 2024-03-22 RX ORDER — IMATINIB MESYLATE 400 MG/1
400 TABLET, FILM COATED ORAL
Qty: 30 | Refills: 3 | Status: ACTIVE | COMMUNITY
Start: 2023-11-15 | End: 1900-01-01

## 2024-03-27 ENCOUNTER — RESULT REVIEW (OUTPATIENT)
Age: 74
End: 2024-03-27

## 2024-03-27 ENCOUNTER — APPOINTMENT (OUTPATIENT)
Dept: INFUSION THERAPY | Facility: HOSPITAL | Age: 74
End: 2024-03-27

## 2024-03-27 ENCOUNTER — APPOINTMENT (OUTPATIENT)
Dept: HEMATOLOGY ONCOLOGY | Facility: CLINIC | Age: 74
End: 2024-03-27

## 2024-03-27 LAB
ANISOCYTOSIS BLD QL: SIGNIFICANT CHANGE UP
BASOPHILS # BLD AUTO: 0 K/UL — SIGNIFICANT CHANGE UP (ref 0–0.2)
BASOPHILS NFR BLD AUTO: 0 % — SIGNIFICANT CHANGE UP (ref 0–2)
DACRYOCYTES BLD QL SMEAR: SIGNIFICANT CHANGE UP
ELLIPTOCYTES BLD QL SMEAR: SLIGHT — SIGNIFICANT CHANGE UP
EOSINOPHIL # BLD AUTO: 0 K/UL — SIGNIFICANT CHANGE UP (ref 0–0.5)
EOSINOPHIL NFR BLD AUTO: 0 % — SIGNIFICANT CHANGE UP (ref 0–6)
GIANT PLATELETS BLD QL SMEAR: PRESENT — SIGNIFICANT CHANGE UP
HCT VFR BLD CALC: 30.2 % — LOW (ref 39–50)
HGB BLD-MCNC: 9.7 G/DL — LOW (ref 13–17)
HYPOCHROMIA BLD QL: SLIGHT — SIGNIFICANT CHANGE UP
LG PLATELETS BLD QL AUTO: SLIGHT — SIGNIFICANT CHANGE UP
LYMPHOCYTES # BLD AUTO: 1.26 K/UL — SIGNIFICANT CHANGE UP (ref 1–3.3)
LYMPHOCYTES # BLD AUTO: 22 % — SIGNIFICANT CHANGE UP (ref 13–44)
MCHC RBC-ENTMCNC: 27.1 PG — SIGNIFICANT CHANGE UP (ref 27–34)
MCHC RBC-ENTMCNC: 32.1 G/DL — SIGNIFICANT CHANGE UP (ref 32–36)
MCV RBC AUTO: 84.4 FL — SIGNIFICANT CHANGE UP (ref 80–100)
MONOCYTES # BLD AUTO: 0.57 K/UL — SIGNIFICANT CHANGE UP (ref 0–0.9)
MONOCYTES NFR BLD AUTO: 10 % — SIGNIFICANT CHANGE UP (ref 2–14)
MYELOCYTES NFR BLD: 3 % — HIGH (ref 0–0)
NEUTROPHILS # BLD AUTO: 3.72 K/UL — SIGNIFICANT CHANGE UP (ref 1.8–7.4)
NEUTROPHILS NFR BLD AUTO: 65 % — SIGNIFICANT CHANGE UP (ref 43–77)
NRBC # BLD: 13 /100 WBCS — HIGH (ref 0–0)
NRBC # BLD: SIGNIFICANT CHANGE UP /100 WBCS (ref 0–0)
PLAT MORPH BLD: ABNORMAL
PLATELET # BLD AUTO: 957 K/UL — HIGH (ref 150–400)
POIKILOCYTOSIS BLD QL AUTO: SIGNIFICANT CHANGE UP
POLYCHROMASIA BLD QL SMEAR: SLIGHT — SIGNIFICANT CHANGE UP
RBC # BLD: 3.58 M/UL — LOW (ref 4.2–5.8)
RBC # FLD: 32.7 % — HIGH (ref 10.3–14.5)
RBC BLD AUTO: ABNORMAL
SCHISTOCYTES BLD QL AUTO: SLIGHT — SIGNIFICANT CHANGE UP
TARGETS BLD QL SMEAR: SLIGHT — SIGNIFICANT CHANGE UP
WBC # BLD: 5.73 K/UL — SIGNIFICANT CHANGE UP (ref 3.8–10.5)
WBC # FLD AUTO: 5.73 K/UL — SIGNIFICANT CHANGE UP (ref 3.8–10.5)

## 2024-04-10 ENCOUNTER — APPOINTMENT (OUTPATIENT)
Dept: HEMATOLOGY ONCOLOGY | Facility: CLINIC | Age: 74
End: 2024-04-10
Payer: MEDICARE

## 2024-04-10 ENCOUNTER — RESULT REVIEW (OUTPATIENT)
Age: 74
End: 2024-04-10

## 2024-04-10 VITALS
HEART RATE: 89 BPM | RESPIRATION RATE: 16 BRPM | BODY MASS INDEX: 22.43 KG/M2 | OXYGEN SATURATION: 98 % | TEMPERATURE: 97.2 F | WEIGHT: 156.31 LBS | DIASTOLIC BLOOD PRESSURE: 92 MMHG | SYSTOLIC BLOOD PRESSURE: 154 MMHG

## 2024-04-10 LAB
ANISOCYTOSIS BLD QL: SIGNIFICANT CHANGE UP
BASOPHILS # BLD AUTO: 0 K/UL — SIGNIFICANT CHANGE UP (ref 0–0.2)
BASOPHILS NFR BLD AUTO: 0 % — SIGNIFICANT CHANGE UP (ref 0–2)
DACRYOCYTES BLD QL SMEAR: SIGNIFICANT CHANGE UP
ELLIPTOCYTES BLD QL SMEAR: SLIGHT — SIGNIFICANT CHANGE UP
EOSINOPHIL # BLD AUTO: 0 K/UL — SIGNIFICANT CHANGE UP (ref 0–0.5)
EOSINOPHIL NFR BLD AUTO: 0 % — SIGNIFICANT CHANGE UP (ref 0–6)
GIANT PLATELETS BLD QL SMEAR: PRESENT — SIGNIFICANT CHANGE UP
HCT VFR BLD CALC: 33.3 % — LOW (ref 39–50)
HGB BLD-MCNC: 10.5 G/DL — LOW (ref 13–17)
HYPOCHROMIA BLD QL: SLIGHT — SIGNIFICANT CHANGE UP
LG PLATELETS BLD QL AUTO: SLIGHT — SIGNIFICANT CHANGE UP
LYMPHOCYTES # BLD AUTO: 1.79 K/UL — SIGNIFICANT CHANGE UP (ref 1–3.3)
LYMPHOCYTES # BLD AUTO: 27 % — SIGNIFICANT CHANGE UP (ref 13–44)
MCHC RBC-ENTMCNC: 27 PG — SIGNIFICANT CHANGE UP (ref 27–34)
MCHC RBC-ENTMCNC: 31.5 G/DL — LOW (ref 32–36)
MCV RBC AUTO: 85.6 FL — SIGNIFICANT CHANGE UP (ref 80–100)
MONOCYTES # BLD AUTO: 0.53 K/UL — SIGNIFICANT CHANGE UP (ref 0–0.9)
MONOCYTES NFR BLD AUTO: 8 % — SIGNIFICANT CHANGE UP (ref 2–14)
MYELOCYTES NFR BLD: 1 % — HIGH (ref 0–0)
NEUTROPHILS # BLD AUTO: 4.1 K/UL — SIGNIFICANT CHANGE UP (ref 1.8–7.4)
NEUTROPHILS NFR BLD AUTO: 62 % — SIGNIFICANT CHANGE UP (ref 43–77)
NRBC # BLD: 33 /100 WBCS — HIGH (ref 0–0)
NRBC # BLD: SIGNIFICANT CHANGE UP /100 WBCS (ref 0–0)
PLAT MORPH BLD: ABNORMAL
PLATELET # BLD AUTO: 925 K/UL — HIGH (ref 150–400)
POIKILOCYTOSIS BLD QL AUTO: SIGNIFICANT CHANGE UP
POLYCHROMASIA BLD QL SMEAR: SLIGHT — SIGNIFICANT CHANGE UP
RBC # BLD: 3.89 M/UL — LOW (ref 4.2–5.8)
RBC # FLD: 32.3 % — HIGH (ref 10.3–14.5)
RBC BLD AUTO: ABNORMAL
SCHISTOCYTES BLD QL AUTO: SLIGHT — SIGNIFICANT CHANGE UP
TARGETS BLD QL SMEAR: SLIGHT — SIGNIFICANT CHANGE UP
VARIANT LYMPHS # BLD: 2 % — SIGNIFICANT CHANGE UP (ref 0–6)
WBC # BLD: 6.62 K/UL — SIGNIFICANT CHANGE UP (ref 3.8–10.5)
WBC # FLD AUTO: 6.62 K/UL — SIGNIFICANT CHANGE UP (ref 3.8–10.5)

## 2024-04-10 PROCEDURE — 99214 OFFICE O/P EST MOD 30 MIN: CPT

## 2024-04-10 RX ORDER — RUXOLITINIB 20 MG/1
20 TABLET ORAL TWICE DAILY
Qty: 60 | Refills: 2 | Status: COMPLETED | COMMUNITY
Start: 2023-11-15 | End: 2024-04-10

## 2024-04-11 LAB
ALBUMIN SERPL ELPH-MCNC: 4.4 G/DL
ALP BLD-CCNC: 78 U/L
ALT SERPL-CCNC: 16 U/L
ANION GAP SERPL CALC-SCNC: 12 MMOL/L
AST SERPL-CCNC: 22 U/L
BILIRUB SERPL-MCNC: 0.8 MG/DL
BUN SERPL-MCNC: 14 MG/DL
CALCIUM SERPL-MCNC: 8.7 MG/DL
CHLORIDE SERPL-SCNC: 107 MMOL/L
CO2 SERPL-SCNC: 24 MMOL/L
CREAT SERPL-MCNC: 0.79 MG/DL
EGFR: 93 ML/MIN/1.73M2
GLUCOSE SERPL-MCNC: 214 MG/DL
LDH SERPL-CCNC: 959 U/L
POTASSIUM SERPL-SCNC: 4.6 MMOL/L
PROT SERPL-MCNC: 6.5 G/DL
SODIUM SERPL-SCNC: 144 MMOL/L
URATE SERPL-MCNC: 8.4 MG/DL

## 2024-04-16 LAB
JAK2 P.V617F BLD/T QL: NORMAL
REFLEX:: NORMAL
T(9;22)(ABL1,BCR)/CONTROL BLD/T: NORMAL

## 2024-04-17 ENCOUNTER — RESULT REVIEW (OUTPATIENT)
Age: 74
End: 2024-04-17

## 2024-04-17 ENCOUNTER — APPOINTMENT (OUTPATIENT)
Dept: INFUSION THERAPY | Facility: HOSPITAL | Age: 74
End: 2024-04-17

## 2024-04-17 ENCOUNTER — APPOINTMENT (OUTPATIENT)
Dept: HEMATOLOGY ONCOLOGY | Facility: CLINIC | Age: 74
End: 2024-04-17

## 2024-04-17 LAB
ANISOCYTOSIS BLD QL: SIGNIFICANT CHANGE UP
BASOPHILS # BLD AUTO: 0 K/UL — SIGNIFICANT CHANGE UP (ref 0–0.2)
BASOPHILS NFR BLD AUTO: 0 % — SIGNIFICANT CHANGE UP (ref 0–2)
BLASTS # FLD: 2 % — HIGH (ref 0–0)
DACRYOCYTES BLD QL SMEAR: SIGNIFICANT CHANGE UP
ELLIPTOCYTES BLD QL SMEAR: SLIGHT — SIGNIFICANT CHANGE UP
EOSINOPHIL # BLD AUTO: 0 K/UL — SIGNIFICANT CHANGE UP (ref 0–0.5)
EOSINOPHIL NFR BLD AUTO: 0 % — SIGNIFICANT CHANGE UP (ref 0–6)
GIANT PLATELETS BLD QL SMEAR: PRESENT — SIGNIFICANT CHANGE UP
HCT VFR BLD CALC: 32.4 % — LOW (ref 39–50)
HGB BLD-MCNC: 10.5 G/DL — LOW (ref 13–17)
HYPOCHROMIA BLD QL: SLIGHT — SIGNIFICANT CHANGE UP
LG PLATELETS BLD QL AUTO: SLIGHT — SIGNIFICANT CHANGE UP
LYMPHOCYTES # BLD AUTO: 1.74 K/UL — SIGNIFICANT CHANGE UP (ref 1–3.3)
LYMPHOCYTES # BLD AUTO: 29 % — SIGNIFICANT CHANGE UP (ref 13–44)
MCHC RBC-ENTMCNC: 27.1 PG — SIGNIFICANT CHANGE UP (ref 27–34)
MCHC RBC-ENTMCNC: 32.4 G/DL — SIGNIFICANT CHANGE UP (ref 32–36)
MCV RBC AUTO: 83.5 FL — SIGNIFICANT CHANGE UP (ref 80–100)
MONOCYTES # BLD AUTO: 0.48 K/UL — SIGNIFICANT CHANGE UP (ref 0–0.9)
MONOCYTES NFR BLD AUTO: 8 % — SIGNIFICANT CHANGE UP (ref 2–14)
NEUTROPHILS # BLD AUTO: 3.67 K/UL — SIGNIFICANT CHANGE UP (ref 1.8–7.4)
NEUTROPHILS NFR BLD AUTO: 61 % — SIGNIFICANT CHANGE UP (ref 43–77)
NRBC # BLD: 6 /100 WBCS — HIGH (ref 0–0)
NRBC # BLD: SIGNIFICANT CHANGE UP /100 WBCS (ref 0–0)
PLAT MORPH BLD: ABNORMAL
PLATELET # BLD AUTO: 500 K/UL — HIGH (ref 150–400)
POIKILOCYTOSIS BLD QL AUTO: SIGNIFICANT CHANGE UP
POLYCHROMASIA BLD QL SMEAR: SLIGHT — SIGNIFICANT CHANGE UP
RBC # BLD: 3.88 M/UL — LOW (ref 4.2–5.8)
RBC # FLD: 32.4 % — HIGH (ref 10.3–14.5)
RBC BLD AUTO: ABNORMAL
SCHISTOCYTES BLD QL AUTO: SLIGHT — SIGNIFICANT CHANGE UP
TARGETS BLD QL SMEAR: SLIGHT — SIGNIFICANT CHANGE UP
WBC # BLD: 6.01 K/UL — SIGNIFICANT CHANGE UP (ref 3.8–10.5)
WBC # FLD AUTO: 6.01 K/UL — SIGNIFICANT CHANGE UP (ref 3.8–10.5)

## 2024-04-17 NOTE — PHYSICAL EXAM
[Fully active, able to carry on all pre-disease performance without restriction] : Status 0 - Fully active, able to carry on all pre-disease performance without restriction [Normal] : affect appropriate [de-identified] : no longer palpale spleen, palpable spleen 5-6 cm below the costal margin during January visit

## 2024-04-17 NOTE — ASSESSMENT
[FreeTextEntry1] : 73-year-old Mr. BAIN is seen in follow up for anemia, thrombocytosis and rule out myelofibrosis. Patient was initially seen more than a year ago (02/2022). He was and continues to be completely asymptomatic. His anemia was normocytic, he has had low folic acid that normalized after folate supplementation. Noted to have mild high ferritin and persistent moderately high LDH in the absence evidence of hemolysis. He did not have splenomegaly on physical exam (02/2022). Intramedullary hemolysis may have happened due to folate deficiency; However, a primary bone marrow process like PMF could not be ruled out. This year (10/2023) he returned for further w/u.  He underwent a bone marrow biopsy. Results are interesting:  Bone marrow biopsy and bone marrow aspirate - Myeloid neoplasm with concurrent BCR-ABL1 translocation, and JAK2 and SF3B1 co-mutations and severe fibrosis, dyserythropoiesis (ring sideroblasts), markedly increased in megakaryocytes, atypical megakaryocytes  (some small some enlarged with multi-lobated nuclei, some naked nuclei) no excess blasts.   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, BCR::ABL positive thrombocytosis)   [ ] MDS/MPN overlap syndrome - Dyserythropoiesis (ring sideroblast) myelofibrosis/thrombocytosis (SF3B1 and JAK2 positive)  - Obtained an abdominal ultrasound - splenomegaly (19.3cm) - On Luspatercept 1 mg/kg Q3 weeks, titrated up to 1.33mg/kg due to poor response in February 2024 - Currently on Jakafi 20mg BID however platelets still harboring in 900k range - Despite high platelets, spleen no longer palpable - Titrate up on Jakafi 25mg BID for thrombocytosis - Continue ASA 81 mg daily - Pending CMP, LDH - Obtain weekly CBC  [ ] 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 Mayers  Addendum 4/17/2024 Patient here for Luspatercept injection. Hgb today 10.5, ok to proceed with Luspatercept.  Discussed BW results with the patient from visit on 4/10.  BCRABL with improvement from  > 10% in Nov 2023 to 2.050% despite inconsistent Imatinib adherance.  JAK2 assay from 39.7% in Nov 2023 to 47%. His platelets have remained in 900 range which reflects the increase; there were many confusion with medication adherance since November which probably explains the increase. Since then his hepatomegaly has resolved. His platelet today was 500 since increasing Jakafi to BID. Pt confirmed that he is currently taking Imatinib, Jakafi 25mg BID and ASA. [facility]  All questions answered to the best of my abilities. Patient verbalized understanding and agreed with the plan.

## 2024-04-17 NOTE — HISTORY OF PRESENT ILLNESS
[de-identified] : 71 year-old Mr. BAIN is seen in consult for "anemia, thrombocytosis, r/o myelofibrosis". Patient states that since Sept 2021 he has been feeling like flies crawling inside his head when he is smelling food or eating; then the flies crawl to his legs. He went to his PCP for this symptom where after a lot of blood work he was found to be anemic, folate deficient. He has been on folic acid since mid January. Lab work also showed thrombocytosis (506). His Hgb was 8.6, MCV normal range, no  retic response, normal iron studies, high ferritin (485), normal  B12. Of note LDH was high at ~775, haptoglobin was normal range (~75). The patient denies any fatigue, weight loss, night sweats, fever. He has no complaints other than the "flies" which have long been abundant in his apartment and now they are crawling in his brain. Of note, patient has no other medical condition and he exercises regularly.   [de-identified] : 10/11/2023 Patient presents for a follow up. He was initially seen in 02/2022. He never returned for a follow up. He has done well in the interim. Denies any major change in his health status. He returned for a follow up as his PCP insisted him so. He admits to some fatigue. Denies any weight loss or early satiety. He denies any abnormal bleeding or thrombosis.   11/145/2023 Patient returns for a follow up and discuss the lab and biopsy results. He endorses no changes in his health status. He had a bone marrow biopsy done in the interim. most of the results are now available. Detailed results are copied in the Results section.   12/13/23 Patient seen in follow up. Since previous visit, he reported that he has not picked up Jakafi or Gleevec yet.. Re-iterated to the pt how important it is to start his medications ASAP to control his disease. He has started Luspatercept and responding well. Today his Hgb is 10.4! Denies fevers, night sweats, unintentional weight loss. No changes in health. Good appetite, stable weight   1/3/23 Patient seen in follow up. Started Jakafi and Gleevec, on Luspatercept plan for treatment today. Hgb today 10.2, platelets 704 (improvement from prior level of 900s). Patient denies any joint pains, no recent infections, denies fevers, night sweats, unintentional weight loss. No changes in health. Good appetite, stable weight.   4/10/2024 Patient seen in follow up. We spoke last on 2/2 in regards to his new POC which was to restart Imatinib 400mg (renewed as per his request), and continue Jakafi 20mg BID and ASA 81mg. HOLD Hydrea for now. We will titrate up on his Luspatercept to 1.33mg/kg as he was not responding well. There has been many challenges with communicating with the pt as he works night shifts. We have left multiple VMs to discuss BW results however was unable to reach him. Today his Hgb has improved but platelets remain high at 925k. He noticed that his spleen size has decreased. He notes some headaches, relieved with Tylenol

## 2024-04-17 NOTE — REVIEW OF SYSTEMS
[Negative] : Allergic/Immunologic [Confused] : no confusion [Dizziness] : no dizziness [Fainting] : no fainting [Difficulty Walking] : no difficulty walking [de-identified] : headaches, relieved w/ tylenol

## 2024-04-17 NOTE — RESULTS/DATA
[FreeTextEntry1] : 4/10/24 Hgb 10.5 Platelet 925k  1/5/24 HGB improved Platelet count decreased 704K < 900K  12/13/23 WBC 5.96 Hgb 8.6 >> 9.5 >> 9.9 >> 10.4 Platelet 450k Pending CMP, LDH, Ferritin   11/15/2023 BCR Final Report  Accession Number: [id number] Date Collected: 11/08/2023 Date Received: 11/09/2023 Date Reported: 11/10/2023 15:40  Specimen: .Blood Indication:  CML/ALL  Test Performed: Quantitative BCR-ABL by real time RT-PCR ________________________________________________________________  RESULTS:   p210: Not Detected   p190: Positive  %BCR-ABL/ABL= >10.000 %   INTERPRETATION:  The specimen analyzed contains the translocation mBCR-ABL1 (p190) associated with Acute Lymphoblastic leukemia. Clinical correlation is recommended.   	 Patient:     BRUCE BAIN   Accession:                             [id number]  Collected Date/Time:                   10/16/2023 17:15 EDT Received Date/Time:                    10/16/2023 20:36 EDT  Hematopathology Report - Auth (Verified)  Specimen(s) Submitted 1  Left pic 2  Bone marrow aspirate  Final Diagnosis 1, 2. Bone marrow biopsy and bone marrow aspirate      - Myeloid neoplasm with concurrent BCR-ABL1 translocation, and  JAK2 and SF3B1 co-mutations and severe fibrosis  See note and description.  Diagnostic note: Per chart review, patient has normocytic anemia with folic acid deficiency which has been corrected now and thrombocytosis in the range of 470K to 600K since December 2021. His hemoglobin levels are trending down from 12.6g/dL to 8.1g/dL. This is the first  bone marrow biopsy and patient has never been treated. Biopsy shows overtly fibrotic marrow with maturing trilineage hematopoiesis, markedly increased megakaryopoiesis and dyserythropoiesis (ring sideroblasts). There is no increase in blast population. Karyotype analysis shows t(9;22)(q34;q11.2) in 9 out of 20 metaphases.  Molecular studies show  JAK2 p.Ioe270Zfu and   SF3B1 p.Cdp526Qgt mutation. Overall the    findings are consistent with myeloid neoplasm with concurrent BCR-ABL1 translocation, and  JAK2 and SF3B1 co-mutations and severe fibrosis. 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 However the detection of BCR/ABL1 translocation by chromosome analysis is a   diagnostic feature of chronic myeloid leukemia.  Co-occurance of  BCR-ABL1 and JAK2 V617F  mutations have been described in small case series. The frequency of co-occurrence, the temporal order of acquisition and clonal relationship of these alterations is poorly understood and it is also unclear for this case since there is no prior bone marrow  biopsy or diagnostic work up. Literature of these rare cases shows many patients progress  to myelofibrosis like our patient, raising the possibility that the two mutant tyrosine    kinases may accelerate disease progression.  Clinical correlation is recommended.  Case is discussed with Dr Jose Mayers via phone call on 10/27/2023 3:30 pm  Microscopic description: 1. Biopsy: Quality:  Small (0.9 cm) core biopsy Cellularity:  30% (patchy cellularity with hematopoiesis alternating with hypocellular   areas of fibrotic stroma)    Myeloid lineage:  Decreased in number, exhibits full    maturation    Erythroid lineage:  Decreased in number, exhibits full    maturation with increased pronormoblasts    Megakaryocytes:  Markedly increased in number, atypical    megakaryocytes  (some small some enlarged with    multilobated nuclei, some naked nuclei ) forming large    clusters in the marrow and also within dilated sinusoids    Blasts:  Not increased    Lymphocytes  Not increased in number, mostly small mature    looking lymphocytes are seen    Plasma cells:  Not increased    Stroma:   Marked fibrosis with dilated sinusoids and    intrasinusoidal hematopoiesis    Clot:   No marrow fragments     2. Aspirate:    Myeloid lineage:  Exhibits full maturation, no    significant dysplastic        changes seen     Erythroid lineage:  Exhibit full maturation with    increased pronormoblasts, no significant dysplastic    changes seen Megakaryocytes:  Rare Lymphocytes:  Not increased Plasma cells:   Not increased Blasts:    Not increased  Bone Marrow Asp Smear Diff Cell Count:  200 Cells. Type  Normal* % Blast  0-3 0% Promyelocyte  1-8 0% Myelocyte  10-15 3% Metamyelocyte  10-15 3% Band/Neutrophil  12-25  37% Eosinophil  1-5 1% Basophil  0-1 0% Pronormoblast  0-2 4% Normoblast  15-25 35% Monocyte  0-2 0% Lymphocyte  10-15 17% Plasma cell  0-1 0%   *Adult Range  Peripheral Blood Smear:   Not available  Ancillary studies Special stains on bone marrow aspirate: Iron stain:  No spicules are present to evaluate for iron stores. There is increase in ring sideroblasts (13%).  Special stains on core biopsy: Reticulin:  Diffuse and dense increase in reticulin with extensive intersections and coarse bundles of thick fibers consistent with collagen (MF=3) Trichrome:  Collagen fibrosis Iron:  Positive  Immunohistochemical stains:    CD34, , MPO, Factor VIII, CD71, E-cad, p53 stains are performed on block 1A  CD34:  Highlights vascular structures, no increase in blast population :  Highlights  scattered masts cells. No increase in blast population MPO:  Highlights myeloid series, decreased Factor VIII:  Highlights megakaryocytes, markedly increased, including occasional small forms CD71 : Highlights erythroid series, decreased E-cad:  Highlights early erythroid series, increased p53:  Normal staining pattern  Flow cytometry analysis (22-WE-):    CD45/side scatter shows myeloblast population (1.21% of total) with normal antigen maturation pattern.  There is no increase in CD34, CD14 or  positive cells. Myeloid antigen pattern is normal with CD13, CD16 and CD11b. Monocytes show normal antigen maturation pattern Lymphocytes (11% of cells): Heterogeneous population of T-cells (with a  CD4 to CD8 ratio 2.31) with no aberrancy or loss of pan-T cell antigen, immunophenotypically unremarkable natural killer cells, and polytypic B-cells  Cytogenetics: Karyotype ([id number]) :   46,XY,t(9;22)(q34;q11.2)[9]/46,XY[11]  Nine of the twenty metaphases examined revealed a translocation involving the long arms of chromosomes 9 and 22 giving rise to the classic [place] chromosome.   The remaining metaphases had an apparently normal karyotype.  Please note that the Initial FISH studies showed normal results for BCR::ABL1 fusion however, subsequent g-banded karyotyping showed a translocation involving chromosomes 9 and 22, with a variant of the classic t(9;22)(q34;q11.2). Therefore, sequential FISH studies were performed using   A  dual-fusion three color probe for BCR and ABL1/ASS1, which showed an atypical signal pattern with A a single BCR::ABL1 fusion signal on alley(22) homologue,    one orange, one green and one aqua instead of two fusion, one orange, one green and two aqua.  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  Additionally, retrospective and repeat FISH studies using dual-fusion three color probe for BCR and ABL1/ASS1 in interphase cells still showed normal results due to the reason mentioned above.  Fluorescence in situ Hybridization ([id number], [id number], [id number]): NORMAL FISH - BCR::ABL1 NORMAL FISH - 5q NORMAL FISH - MPD PANEL  Probe(s) and Location(s): ASS1, ABL1 (9q34), BCR (22q11.2) ISCN Nomenclature: nuc zak(ASS1,ABL1,BCR)x2[194/200] Probe(s) and Location(s):   X5S507/ D5S23 (5p15.2), EGR1 (5q31) ISCN Nomenclature:  nuc zak(H2L562/D5S23,EGR1)x2[198/200] Probe(s) and Location(s):   PDGFRA LSI 4q12 (4q12) ([employer]), PDGFRB (8y35-u38) ([employer]), FGFR1 (8p11.23-p11.2), CEP 8/D8Z2(8p11.1-q11.1) ([employer]) ISCN Nomenclature:  nuc zak(SCFD2,LNX,PDGFRA)x2[198/200],(PDGFRBx2)[197/200 ], (FGFR1,D8Z2)x2[198/200]  Molecular Studies: [facility] Advanced NGS Myeloid Panel  DETECTED GENOMIC ALTERATIONS:   Tier I: Variants of Strong Clinical Significance   JAK2 p.Dqv660Phi  Chr: 9   Pos: [id number]   Ref: G   Alt: T    Coverage: 792    Allele Freq: 46.34   cDNA change: c.1849G>T   Exon: 14   ClinVar ID: -    SF3B1 p.[id number]   Chr: 2   Pos: [id number]   Ref: T   Alt: C   Coverage: 613   Allele Freq: 43.88   cDNA change: c.2098A>G   Exon: 15   ClinVar ID: -    Tier II: Variants of Potential Clinical Significance   ATRX p.[id number]   Chr: X   Pos: [id number]   Ref: CT   Alt: C   Coverage: 673   Allele Freq: 5.65   cDNA change: c.6848del   Exon: 31   ClinVar ID: -  PERTINENT NEGATIVE RESULTS:   The following genes are NEGATIVE for clinically relevant mutations.   Mutational hotspots and surrounding exonic regions were interrogated   for DNA level point mutations and indels (fusions not assayed).   ABL1, ANKRD26, ASXL1, BCOR, BCORL1, BRAF, CALR, CBL, CCND2, CDKN2A,   CEBPA, CSF3R, CUX1, DDX41, DNMT3A, ETNK1, ETV6, EZH2, FBXW7, FLT3,   GATA2, HRAS, IDH1, IDH2, KDM6A, KIT, KMT2A, KRAS, MAP2K1, MPL,   MYD88, NF1, NPM1, NRAS, PDGFRA, PHF6, PTEN, PTPN11, RUNX1, SETBP1,   SRSF2, STAG2, TET2, TP53, U2AF1, WT1, ZRSR2  JAK2  V617F Activating Mutation Assay by Real-Time PCR ([id number]): Positive JAK2 V617F %: 39.7 %  Comment: Iron stain (examined to evaluate for iron stores, see microscopic description) and Giemsa stain (shows appropriate staining pattern) are performed on blocks 1A, 1B  Verified by: Michael Herman MD (Electronic Signature) Reported on: 11/03/23 12:39 EDT, [facility] [facility] [place] [address] Phone: [phone]   Fax: [phone] _________________________________________________________________  Clinical Information *** The external document could not be loaded. ***   Gross Description 1. The specimen is received in Bouins fixative and the specimen container is labeled    "L Pic". It consists of one bone marrow core measuring 0.9 x 0.2 cm with a 0.8 x 0.8 x 0.1 cm aggregate of clotted blood. Entirely submitted in 2 cassettes:   1A: Bone marrow core   1B: Blood clot  2. Two Barba-Giemsa and one iron stained bone marrow aspirate smears are submitted.  Tia CANTU 10/17/2023 06:36 AM.  Disclaimer In addition to other data that may appear on the specimen containers, all labels have been inspected to confirm the presence of the patients name and date of birth.  Histological Processing Performed at [facility], Department of Pathology, [address] [place] [address].    10/11/2023 Normocytic anemia - Hgb 8.5 Thrombocytosis Plt 573 Manual differential reportedly showed 2-3% blasts    2/4/2022  Available labs reviewed, relevant data as below: Anemia, Hgb 8.6, normal MCV (84); no retic response, Normal iron studies, high ferritin (485),  Low Folic acid  (3.3), normal  B12 Thrombocytosis (506).   LDH high at ~750  Haptoglobin normal range.

## 2024-04-17 NOTE — END OF VISIT
[] : Fellow [Time Spent: ___ minutes] : I have spent [unfilled] minutes of time on the encounter. [FreeTextEntry3] : Patient seen with fellow who documented the visit. I reviewed and edited the note as needed.

## 2024-04-17 NOTE — CONSULT LETTER
[Dear  ___] : Dear  [unfilled], [Consult Letter:] : I had the pleasure of evaluating your patient, [unfilled]. [Please see my note below.] : Please see my note below. [Consult Closing:] : Thank you very much for allowing me to participate in the care of this patient.  If you have any questions, please do not hesitate to contact me. [Sincerely,] : Sincerely, [FreeTextEntry3] : Jose Mayers MD, PhD, MS \par  Attending Physician \par  Hematology-Oncology \par  [facility]\par

## 2024-04-22 ENCOUNTER — APPOINTMENT (OUTPATIENT)
Dept: INTERNAL MEDICINE | Facility: CLINIC | Age: 74
End: 2024-04-22

## 2024-04-24 ENCOUNTER — RESULT REVIEW (OUTPATIENT)
Age: 74
End: 2024-04-24

## 2024-04-24 ENCOUNTER — APPOINTMENT (OUTPATIENT)
Dept: HEMATOLOGY ONCOLOGY | Facility: CLINIC | Age: 74
End: 2024-04-24

## 2024-04-24 LAB
ANISOCYTOSIS BLD QL: SIGNIFICANT CHANGE UP
BASOPHILS # BLD AUTO: 0 K/UL — SIGNIFICANT CHANGE UP (ref 0–0.2)
BASOPHILS NFR BLD AUTO: 0 % — SIGNIFICANT CHANGE UP (ref 0–2)
DACRYOCYTES BLD QL SMEAR: SIGNIFICANT CHANGE UP
ELLIPTOCYTES BLD QL SMEAR: SLIGHT — SIGNIFICANT CHANGE UP
EOSINOPHIL # BLD AUTO: 0.06 K/UL — SIGNIFICANT CHANGE UP (ref 0–0.5)
EOSINOPHIL NFR BLD AUTO: 1 % — SIGNIFICANT CHANGE UP (ref 0–6)
HCT VFR BLD CALC: 31.1 % — LOW (ref 39–50)
HGB BLD-MCNC: 10.3 G/DL — LOW (ref 13–17)
HYPOCHROMIA BLD QL: SLIGHT — SIGNIFICANT CHANGE UP
LG PLATELETS BLD QL AUTO: SLIGHT — SIGNIFICANT CHANGE UP
LYMPHOCYTES # BLD AUTO: 1.36 K/UL — SIGNIFICANT CHANGE UP (ref 1–3.3)
LYMPHOCYTES # BLD AUTO: 22 % — SIGNIFICANT CHANGE UP (ref 13–44)
MCHC RBC-ENTMCNC: 27.2 PG — SIGNIFICANT CHANGE UP (ref 27–34)
MCHC RBC-ENTMCNC: 33.1 G/DL — SIGNIFICANT CHANGE UP (ref 32–36)
MCV RBC AUTO: 82.1 FL — SIGNIFICANT CHANGE UP (ref 80–100)
METAMYELOCYTES # FLD: 2 % — HIGH (ref 0–0)
MONOCYTES # BLD AUTO: 0.25 K/UL — SIGNIFICANT CHANGE UP (ref 0–0.9)
MONOCYTES NFR BLD AUTO: 4 % — SIGNIFICANT CHANGE UP (ref 2–14)
NEUTROPHILS # BLD AUTO: 4.39 K/UL — SIGNIFICANT CHANGE UP (ref 1.8–7.4)
NEUTROPHILS NFR BLD AUTO: 71 % — SIGNIFICANT CHANGE UP (ref 43–77)
NRBC # BLD: 2 /100 WBCS — HIGH (ref 0–0)
NRBC # BLD: SIGNIFICANT CHANGE UP /100 WBCS (ref 0–0)
PLAT MORPH BLD: ABNORMAL
PLATELET # BLD AUTO: 783 K/UL — HIGH (ref 150–400)
POIKILOCYTOSIS BLD QL AUTO: SIGNIFICANT CHANGE UP
POLYCHROMASIA BLD QL SMEAR: SLIGHT — SIGNIFICANT CHANGE UP
RBC # BLD: 3.79 M/UL — LOW (ref 4.2–5.8)
RBC # FLD: 30.9 % — HIGH (ref 10.3–14.5)
RBC BLD AUTO: ABNORMAL
SCHISTOCYTES BLD QL AUTO: SLIGHT — SIGNIFICANT CHANGE UP
TARGETS BLD QL SMEAR: SLIGHT — SIGNIFICANT CHANGE UP
WBC # BLD: 6.19 K/UL — SIGNIFICANT CHANGE UP (ref 3.8–10.5)
WBC # FLD AUTO: 6.19 K/UL — SIGNIFICANT CHANGE UP (ref 3.8–10.5)

## 2024-05-01 ENCOUNTER — APPOINTMENT (OUTPATIENT)
Dept: HEMATOLOGY ONCOLOGY | Facility: CLINIC | Age: 74
End: 2024-05-01

## 2024-05-08 ENCOUNTER — NON-APPOINTMENT (OUTPATIENT)
Age: 74
End: 2024-05-08

## 2024-05-08 ENCOUNTER — APPOINTMENT (OUTPATIENT)
Dept: OPHTHALMOLOGY | Facility: CLINIC | Age: 74
End: 2024-05-08
Payer: MEDICARE

## 2024-05-08 PROCEDURE — 92012 INTRM OPH EXAM EST PATIENT: CPT

## 2024-05-13 ENCOUNTER — RESULT REVIEW (OUTPATIENT)
Age: 74
End: 2024-05-13

## 2024-05-13 ENCOUNTER — APPOINTMENT (OUTPATIENT)
Dept: HEMATOLOGY ONCOLOGY | Facility: CLINIC | Age: 74
End: 2024-05-13

## 2024-05-13 LAB
ANISOCYTOSIS BLD QL: SIGNIFICANT CHANGE UP
BASOPHILS # BLD AUTO: 0 K/UL — SIGNIFICANT CHANGE UP (ref 0–0.2)
BASOPHILS NFR BLD AUTO: 0 % — SIGNIFICANT CHANGE UP (ref 0–2)
BLASTS # FLD: 1 % — HIGH (ref 0–0)
DACRYOCYTES BLD QL SMEAR: SIGNIFICANT CHANGE UP
ELLIPTOCYTES BLD QL SMEAR: SLIGHT — SIGNIFICANT CHANGE UP
EOSINOPHIL # BLD AUTO: 0.25 K/UL — SIGNIFICANT CHANGE UP (ref 0–0.5)
EOSINOPHIL NFR BLD AUTO: 4 % — SIGNIFICANT CHANGE UP (ref 0–6)
GIANT PLATELETS BLD QL SMEAR: PRESENT — SIGNIFICANT CHANGE UP
HCT VFR BLD CALC: 25 % — LOW (ref 39–50)
HGB BLD-MCNC: 8.2 G/DL — LOW (ref 13–17)
HYPOCHROMIA BLD QL: SLIGHT — SIGNIFICANT CHANGE UP
LG PLATELETS BLD QL AUTO: SIGNIFICANT CHANGE UP
LYMPHOCYTES # BLD AUTO: 1.29 K/UL — SIGNIFICANT CHANGE UP (ref 1–3.3)
LYMPHOCYTES # BLD AUTO: 21 % — SIGNIFICANT CHANGE UP (ref 13–44)
MCHC RBC-ENTMCNC: 26.5 PG — LOW (ref 27–34)
MCHC RBC-ENTMCNC: 32.8 G/DL — SIGNIFICANT CHANGE UP (ref 32–36)
MCV RBC AUTO: 80.9 FL — SIGNIFICANT CHANGE UP (ref 80–100)
MONOCYTES # BLD AUTO: 0.25 K/UL — SIGNIFICANT CHANGE UP (ref 0–0.9)
MONOCYTES NFR BLD AUTO: 4 % — SIGNIFICANT CHANGE UP (ref 2–14)
NEUTROPHILS # BLD AUTO: 4.31 K/UL — SIGNIFICANT CHANGE UP (ref 1.8–7.4)
NEUTROPHILS NFR BLD AUTO: 70 % — SIGNIFICANT CHANGE UP (ref 43–77)
NRBC # BLD: 11 /100 WBCS — HIGH (ref 0–0)
NRBC # BLD: SIGNIFICANT CHANGE UP /100 WBCS (ref 0–0)
PLAT MORPH BLD: ABNORMAL
PLATELET # BLD AUTO: 844 K/UL — HIGH (ref 150–400)
POIKILOCYTOSIS BLD QL AUTO: SIGNIFICANT CHANGE UP
POLYCHROMASIA BLD QL SMEAR: SLIGHT — SIGNIFICANT CHANGE UP
RBC # BLD: 3.09 M/UL — LOW (ref 4.2–5.8)
RBC # FLD: 30.3 % — HIGH (ref 10.3–14.5)
RBC BLD AUTO: ABNORMAL
SCHISTOCYTES BLD QL AUTO: SLIGHT — SIGNIFICANT CHANGE UP
TARGETS BLD QL SMEAR: SLIGHT — SIGNIFICANT CHANGE UP
WBC # BLD: 6.16 K/UL — SIGNIFICANT CHANGE UP (ref 3.8–10.5)
WBC # FLD AUTO: 6.16 K/UL — SIGNIFICANT CHANGE UP (ref 3.8–10.5)

## 2024-05-14 DIAGNOSIS — D75.81 MYELOFIBROSIS: ICD-10-CM

## 2024-05-14 RX ORDER — RUXOLITINIB 25 MG/1
25 TABLET ORAL TWICE DAILY
Qty: 60 | Refills: 3 | Status: COMPLETED | COMMUNITY
Start: 2024-04-10 | End: 2024-05-14

## 2024-05-14 RX ORDER — ANAGRELIDE 0.5 MG/1
0.5 CAPSULE ORAL TWICE DAILY
Qty: 60 | Refills: 1 | Status: ACTIVE | COMMUNITY
Start: 2024-05-14 | End: 1900-01-01

## 2024-05-15 ENCOUNTER — OUTPATIENT (OUTPATIENT)
Dept: OUTPATIENT SERVICES | Facility: HOSPITAL | Age: 74
LOS: 1 days | Discharge: HOME | End: 2024-05-15
Payer: MEDICARE

## 2024-05-15 DIAGNOSIS — D69.6 THROMBOCYTOPENIA, UNSPECIFIED: ICD-10-CM

## 2024-05-20 ENCOUNTER — NON-APPOINTMENT (OUTPATIENT)
Age: 74
End: 2024-05-20

## 2024-05-20 ENCOUNTER — RESULT REVIEW (OUTPATIENT)
Age: 74
End: 2024-05-20

## 2024-05-20 ENCOUNTER — APPOINTMENT (OUTPATIENT)
Dept: INFUSION THERAPY | Facility: HOSPITAL | Age: 74
End: 2024-05-20

## 2024-05-20 ENCOUNTER — APPOINTMENT (OUTPATIENT)
Dept: HEMATOLOGY ONCOLOGY | Facility: CLINIC | Age: 74
End: 2024-05-20
Payer: MEDICARE

## 2024-05-20 ENCOUNTER — OUTPATIENT (OUTPATIENT)
Dept: OUTPATIENT SERVICES | Facility: HOSPITAL | Age: 74
LOS: 1 days | End: 2024-05-20

## 2024-05-20 VITALS
HEART RATE: 100 BPM | DIASTOLIC BLOOD PRESSURE: 70 MMHG | WEIGHT: 154.32 LBS | TEMPERATURE: 97.2 F | SYSTOLIC BLOOD PRESSURE: 119 MMHG | BODY MASS INDEX: 22.14 KG/M2 | OXYGEN SATURATION: 99 % | RESPIRATION RATE: 17 BRPM

## 2024-05-20 DIAGNOSIS — C92.10 CHRONIC MYELOID LEUKEMIA, BCR/ABL-POSITIVE, NOT HAVING ACHIEVED REMISSION: ICD-10-CM

## 2024-05-20 DIAGNOSIS — D46.9 MYELODYSPLASTIC SYNDROME, UNSPECIFIED: ICD-10-CM

## 2024-05-20 LAB
ANISOCYTOSIS BLD QL: SIGNIFICANT CHANGE UP
BASOPHILS # BLD AUTO: 0.06 K/UL — SIGNIFICANT CHANGE UP (ref 0–0.2)
BASOPHILS NFR BLD AUTO: 1 % — SIGNIFICANT CHANGE UP (ref 0–2)
DACRYOCYTES BLD QL SMEAR: SIGNIFICANT CHANGE UP
ELLIPTOCYTES BLD QL SMEAR: SLIGHT — SIGNIFICANT CHANGE UP
EOSINOPHIL # BLD AUTO: 0.12 K/UL — SIGNIFICANT CHANGE UP (ref 0–0.5)
EOSINOPHIL NFR BLD AUTO: 2 % — SIGNIFICANT CHANGE UP (ref 0–6)
GIANT PLATELETS BLD QL SMEAR: PRESENT — SIGNIFICANT CHANGE UP
HCT VFR BLD CALC: 24.2 % — LOW (ref 39–50)
HGB BLD-MCNC: 7.9 G/DL — LOW (ref 13–17)
HOWELL-JOLLY BOD BLD QL SMEAR: PRESENT — SIGNIFICANT CHANGE UP
HYPOCHROMIA BLD QL: SLIGHT — SIGNIFICANT CHANGE UP
LG PLATELETS BLD QL AUTO: SIGNIFICANT CHANGE UP
LYMPHOCYTES # BLD AUTO: 0.54 K/UL — LOW (ref 1–3.3)
LYMPHOCYTES # BLD AUTO: 9 % — LOW (ref 13–44)
MCHC RBC-ENTMCNC: 26.1 PG — LOW (ref 27–34)
MCHC RBC-ENTMCNC: 32.6 G/DL — SIGNIFICANT CHANGE UP (ref 32–36)
MCV RBC AUTO: 79.9 FL — LOW (ref 80–100)
MONOCYTES # BLD AUTO: 0.36 K/UL — SIGNIFICANT CHANGE UP (ref 0–0.9)
MONOCYTES NFR BLD AUTO: 6 % — SIGNIFICANT CHANGE UP (ref 2–14)
MYELOCYTES NFR BLD: 1 % — HIGH (ref 0–0)
NEUTROPHILS # BLD AUTO: 4.82 K/UL — SIGNIFICANT CHANGE UP (ref 1.8–7.4)
NEUTROPHILS NFR BLD AUTO: 80 % — HIGH (ref 43–77)
NEUTS BAND # BLD: 1 % — SIGNIFICANT CHANGE UP (ref 0–8)
NRBC # BLD: 11 /100 WBCS — HIGH (ref 0–0)
NRBC # BLD: SIGNIFICANT CHANGE UP /100 WBCS (ref 0–0)
PLAT MORPH BLD: ABNORMAL
PLATELET # BLD AUTO: 795 K/UL — HIGH (ref 150–400)
POIKILOCYTOSIS BLD QL AUTO: SIGNIFICANT CHANGE UP
POLYCHROMASIA BLD QL SMEAR: SLIGHT — SIGNIFICANT CHANGE UP
RBC # BLD: 3.03 M/UL — LOW (ref 4.2–5.8)
RBC # FLD: 30.4 % — HIGH (ref 10.3–14.5)
RBC BLD AUTO: ABNORMAL
SCHISTOCYTES BLD QL AUTO: SLIGHT — SIGNIFICANT CHANGE UP
TARGETS BLD QL SMEAR: SLIGHT — SIGNIFICANT CHANGE UP
WBC # BLD: 5.95 K/UL — SIGNIFICANT CHANGE UP (ref 3.8–10.5)
WBC # FLD AUTO: 5.95 K/UL — SIGNIFICANT CHANGE UP (ref 3.8–10.5)

## 2024-05-20 PROCEDURE — 99213 OFFICE O/P EST LOW 20 MIN: CPT

## 2024-05-20 PROCEDURE — 93010 ELECTROCARDIOGRAM REPORT: CPT

## 2024-05-21 ENCOUNTER — NON-APPOINTMENT (OUTPATIENT)
Age: 74
End: 2024-05-21

## 2024-05-22 ENCOUNTER — NON-APPOINTMENT (OUTPATIENT)
Age: 74
End: 2024-05-22

## 2024-05-22 ENCOUNTER — INPATIENT (INPATIENT)
Facility: HOSPITAL | Age: 74
LOS: 0 days | Discharge: HOME | DRG: 871 | End: 2024-05-23
Attending: STUDENT IN AN ORGANIZED HEALTH CARE EDUCATION/TRAINING PROGRAM | Admitting: STUDENT IN AN ORGANIZED HEALTH CARE EDUCATION/TRAINING PROGRAM
Payer: MEDICARE

## 2024-05-22 ENCOUNTER — APPOINTMENT (OUTPATIENT)
Dept: INFUSION THERAPY | Facility: HOSPITAL | Age: 74
End: 2024-05-22

## 2024-05-22 VITALS
DIASTOLIC BLOOD PRESSURE: 53 MMHG | RESPIRATION RATE: 18 BRPM | HEART RATE: 115 BPM | HEIGHT: 66 IN | SYSTOLIC BLOOD PRESSURE: 109 MMHG | TEMPERATURE: 98 F | WEIGHT: 169.98 LBS | OXYGEN SATURATION: 94 %

## 2024-05-22 DIAGNOSIS — I26.99 OTHER PULMONARY EMBOLISM WITHOUT ACUTE COR PULMONALE: ICD-10-CM

## 2024-05-22 LAB
ADD ON TEST-SPECIMEN IN LAB: SIGNIFICANT CHANGE UP
ADD ON TEST-SPECIMEN IN LAB: SIGNIFICANT CHANGE UP
ALBUMIN SERPL ELPH-MCNC: 4.3 G/DL — SIGNIFICANT CHANGE UP (ref 3.3–5)
ALP SERPL-CCNC: 87 U/L — SIGNIFICANT CHANGE UP (ref 40–120)
ALT FLD-CCNC: 23 U/L — SIGNIFICANT CHANGE UP (ref 10–45)
ANION GAP SERPL CALC-SCNC: 12 MMOL/L — SIGNIFICANT CHANGE UP (ref 5–17)
ANISOCYTOSIS BLD QL: SIGNIFICANT CHANGE UP
APPEARANCE UR: CLEAR — SIGNIFICANT CHANGE UP
APTT BLD: 34.2 SEC — SIGNIFICANT CHANGE UP (ref 24.5–35.6)
AST SERPL-CCNC: 31 U/L — SIGNIFICANT CHANGE UP (ref 10–40)
BACTERIA # UR AUTO: NEGATIVE /HPF — SIGNIFICANT CHANGE UP
BASE EXCESS BLDV CALC-SCNC: -6.6 MMOL/L — LOW (ref -2–3)
BASOPHILS # BLD AUTO: 0.18 K/UL — SIGNIFICANT CHANGE UP (ref 0–0.2)
BASOPHILS NFR BLD AUTO: 3.5 % — HIGH (ref 0–2)
BILIRUB SERPL-MCNC: 0.9 MG/DL — SIGNIFICANT CHANGE UP (ref 0.2–1.2)
BILIRUB UR-MCNC: NEGATIVE — SIGNIFICANT CHANGE UP
BUN SERPL-MCNC: 10 MG/DL — SIGNIFICANT CHANGE UP (ref 7–23)
CA-I SERPL-SCNC: 1.13 MMOL/L — LOW (ref 1.15–1.33)
CALCIUM SERPL-MCNC: 8 MG/DL — LOW (ref 8.4–10.5)
CAST: 0 /LPF — SIGNIFICANT CHANGE UP (ref 0–4)
CHLORIDE BLDV-SCNC: 110 MMOL/L — HIGH (ref 96–108)
CHLORIDE SERPL-SCNC: 107 MMOL/L — SIGNIFICANT CHANGE UP (ref 96–108)
CK MB BLD-MCNC: 0.7 % — SIGNIFICANT CHANGE UP (ref 0–3.5)
CK MB CFR SERPL CALC: 1.6 NG/ML — SIGNIFICANT CHANGE UP (ref 0–6.7)
CK SERPL-CCNC: 241 U/L — HIGH (ref 30–200)
CO2 BLDV-SCNC: 20 MMOL/L — LOW (ref 22–26)
CO2 SERPL-SCNC: 19 MMOL/L — LOW (ref 22–31)
COLOR SPEC: YELLOW — SIGNIFICANT CHANGE UP
CREAT SERPL-MCNC: 0.99 MG/DL — SIGNIFICANT CHANGE UP (ref 0.5–1.3)
DACRYOCYTES BLD QL SMEAR: SLIGHT — SIGNIFICANT CHANGE UP
DIFF PNL FLD: NEGATIVE — SIGNIFICANT CHANGE UP
EGFR: 80 ML/MIN/1.73M2 — SIGNIFICANT CHANGE UP
EOSINOPHIL # BLD AUTO: 0.05 K/UL — SIGNIFICANT CHANGE UP (ref 0–0.5)
EOSINOPHIL NFR BLD AUTO: 0.9 % — SIGNIFICANT CHANGE UP (ref 0–6)
GAS PNL BLDV: 138 MMOL/L — SIGNIFICANT CHANGE UP (ref 136–145)
GAS PNL BLDV: SIGNIFICANT CHANGE UP
GAS PNL BLDV: SIGNIFICANT CHANGE UP
GIANT PLATELETS BLD QL SMEAR: PRESENT — SIGNIFICANT CHANGE UP
GLUCOSE BLDV-MCNC: 226 MG/DL — HIGH (ref 70–99)
GLUCOSE SERPL-MCNC: 215 MG/DL — HIGH (ref 70–99)
GLUCOSE UR QL: NEGATIVE MG/DL — SIGNIFICANT CHANGE UP
HCO3 BLDV-SCNC: 19 MMOL/L — LOW (ref 22–29)
HCT VFR BLD CALC: 23.8 % — LOW (ref 39–50)
HCT VFR BLDA CALC: 23 % — LOW (ref 39–51)
HGB BLD CALC-MCNC: 7.8 G/DL — LOW (ref 12.6–17.4)
HGB BLD-MCNC: 7.5 G/DL — LOW (ref 13–17)
HYPOCHROMIA BLD QL: SIGNIFICANT CHANGE UP
INR BLD: 1.68 RATIO — HIGH (ref 0.85–1.18)
KETONES UR-MCNC: NEGATIVE MG/DL — SIGNIFICANT CHANGE UP
LACTATE BLDV-MCNC: 2.1 MMOL/L — HIGH (ref 0.5–2)
LEUKOCYTE ESTERASE UR-ACNC: NEGATIVE — SIGNIFICANT CHANGE UP
LYMPHOCYTES # BLD AUTO: 0.42 K/UL — LOW (ref 1–3.3)
LYMPHOCYTES # BLD AUTO: 8 % — LOW (ref 13–44)
MACROCYTES BLD QL: SLIGHT — SIGNIFICANT CHANGE UP
MANUAL SMEAR VERIFICATION: SIGNIFICANT CHANGE UP
MCHC RBC-ENTMCNC: 26 PG — LOW (ref 27–34)
MCHC RBC-ENTMCNC: 31.5 GM/DL — LOW (ref 32–36)
MCV RBC AUTO: 82.6 FL — SIGNIFICANT CHANGE UP (ref 80–100)
MICROCYTES BLD QL: SLIGHT — SIGNIFICANT CHANGE UP
MONOCYTES # BLD AUTO: 0.42 K/UL — SIGNIFICANT CHANGE UP (ref 0–0.9)
MONOCYTES NFR BLD AUTO: 8 % — SIGNIFICANT CHANGE UP (ref 2–14)
MYELOCYTES NFR BLD: 2.6 % — HIGH (ref 0–0)
NEUTROPHILS # BLD AUTO: 4.01 K/UL — SIGNIFICANT CHANGE UP (ref 1.8–7.4)
NEUTROPHILS NFR BLD AUTO: 65.5 % — SIGNIFICANT CHANGE UP (ref 43–77)
NEUTS BAND # BLD: 10.6 % — HIGH (ref 0–8)
NITRITE UR-MCNC: NEGATIVE — SIGNIFICANT CHANGE UP
NRBC # BLD: 8 /100 WBCS — HIGH (ref 0–0)
NT-PROBNP SERPL-SCNC: 520 PG/ML — HIGH (ref 0–300)
OVALOCYTES BLD QL SMEAR: SLIGHT — SIGNIFICANT CHANGE UP
PCO2 BLDV: 38 MMHG — LOW (ref 42–55)
PH BLDV: 7.31 — LOW (ref 7.32–7.43)
PH UR: 6.5 — SIGNIFICANT CHANGE UP (ref 5–8)
PLAT MORPH BLD: ABNORMAL
PLATELET # BLD AUTO: 620 K/UL — HIGH (ref 150–400)
PO2 BLDV: 23 MMHG — LOW (ref 25–45)
POLYCHROMASIA BLD QL SMEAR: SLIGHT — SIGNIFICANT CHANGE UP
POTASSIUM BLDV-SCNC: 4.6 MMOL/L — SIGNIFICANT CHANGE UP (ref 3.5–5.1)
POTASSIUM SERPL-MCNC: 5 MMOL/L — SIGNIFICANT CHANGE UP (ref 3.5–5.3)
POTASSIUM SERPL-SCNC: 5 MMOL/L — SIGNIFICANT CHANGE UP (ref 3.5–5.3)
PROT SERPL-MCNC: 7.2 G/DL — SIGNIFICANT CHANGE UP (ref 6–8.3)
PROT UR-MCNC: 30 MG/DL
PROTHROM AB SERPL-ACNC: 17.4 SEC — HIGH (ref 9.5–13)
RBC # BLD: 2.88 M/UL — LOW (ref 4.2–5.8)
RBC # FLD: 31.2 % — HIGH (ref 10.3–14.5)
RBC BLD AUTO: ABNORMAL
RBC CASTS # UR COMP ASSIST: 0 /HPF — SIGNIFICANT CHANGE UP (ref 0–4)
SAO2 % BLDV: 24.4 % — LOW (ref 67–88)
SCHISTOCYTES BLD QL AUTO: SLIGHT — SIGNIFICANT CHANGE UP
SODIUM SERPL-SCNC: 138 MMOL/L — SIGNIFICANT CHANGE UP (ref 135–145)
SP GR SPEC: >1.03 — HIGH (ref 1–1.03)
SQUAMOUS # UR AUTO: 0 /HPF — SIGNIFICANT CHANGE UP (ref 0–5)
TARGETS BLD QL SMEAR: SLIGHT — SIGNIFICANT CHANGE UP
TROPONIN T, HIGH SENSITIVITY RESULT: 54 NG/L — HIGH (ref 0–51)
TROPONIN T, HIGH SENSITIVITY RESULT: 67 NG/L — HIGH (ref 0–51)
UROBILINOGEN FLD QL: 1 MG/DL — SIGNIFICANT CHANGE UP (ref 0.2–1)
VARIANT LYMPHS # BLD: 0.9 % — SIGNIFICANT CHANGE UP (ref 0–6)
WBC # BLD: 5.27 K/UL — SIGNIFICANT CHANGE UP (ref 3.8–10.5)
WBC # FLD AUTO: 5.27 K/UL — SIGNIFICANT CHANGE UP (ref 3.8–10.5)
WBC UR QL: 0 /HPF — SIGNIFICANT CHANGE UP (ref 0–5)

## 2024-05-22 PROCEDURE — 93308 TTE F-UP OR LMTD: CPT | Mod: 26

## 2024-05-22 PROCEDURE — 71046 X-RAY EXAM CHEST 2 VIEWS: CPT | Mod: 26

## 2024-05-22 PROCEDURE — 71275 CT ANGIOGRAPHY CHEST: CPT | Mod: 26,MC

## 2024-05-22 PROCEDURE — 99285 EMERGENCY DEPT VISIT HI MDM: CPT | Mod: FS

## 2024-05-22 RX ORDER — HEPARIN SODIUM 5000 [USP'U]/ML
INJECTION INTRAVENOUS; SUBCUTANEOUS
Qty: 25000 | Refills: 0 | Status: DISCONTINUED | OUTPATIENT
Start: 2024-05-22 | End: 2024-05-23

## 2024-05-22 RX ORDER — ASPIRIN/CALCIUM CARB/MAGNESIUM 324 MG
324 TABLET ORAL ONCE
Refills: 0 | Status: COMPLETED | OUTPATIENT
Start: 2024-05-22 | End: 2024-05-22

## 2024-05-22 RX ORDER — HEPARIN SODIUM 5000 [USP'U]/ML
5500 INJECTION INTRAVENOUS; SUBCUTANEOUS EVERY 6 HOURS
Refills: 0 | Status: DISCONTINUED | OUTPATIENT
Start: 2024-05-22 | End: 2024-05-23

## 2024-05-22 RX ORDER — HEPARIN SODIUM 5000 [USP'U]/ML
5500 INJECTION INTRAVENOUS; SUBCUTANEOUS ONCE
Refills: 0 | Status: COMPLETED | OUTPATIENT
Start: 2024-05-22 | End: 2024-05-22

## 2024-05-22 RX ORDER — HEPARIN SODIUM 5000 [USP'U]/ML
2500 INJECTION INTRAVENOUS; SUBCUTANEOUS EVERY 6 HOURS
Refills: 0 | Status: DISCONTINUED | OUTPATIENT
Start: 2024-05-22 | End: 2024-05-23

## 2024-05-22 RX ADMIN — Medication 324 MILLIGRAM(S): at 18:02

## 2024-05-22 RX ADMIN — HEPARIN SODIUM 1200 UNIT(S)/HR: 5000 INJECTION INTRAVENOUS; SUBCUTANEOUS at 23:17

## 2024-05-22 RX ADMIN — HEPARIN SODIUM 5500 UNIT(S): 5000 INJECTION INTRAVENOUS; SUBCUTANEOUS at 23:18

## 2024-05-22 NOTE — ED PROVIDER NOTE - CLINICAL SUMMARY MEDICAL DECISION MAKING FREE TEXT BOX
Marcelino (fellow): 73-year-old history of anemia as well as myelofibrosis on Jakafi presenting to the emergency department with progressively worsening shortness of breath and difficulty breathing x 2 weeks.  Differential includes worsening progressive anemia, unstable angina, new onset CHF, pneumonia, pulmonary embolism.  He is currently hemodynamically stable although tachycardic at this time, EKG done in triage shows show inversions in 2 3 and aVF, no prior to compare to.  Will give aspirin.  Will get troponin will trend EKGs.  Will send for CT pulmonary embolism study.  Evaluate BNP and x-ray to evaluate for underlying possible heart failure.  Ultimately will need admission.  At this time not requiring supplemental oxygen. Marcelino (fellow): 73-year-old history of anemia as well as myelofibrosis on Jakafi presenting to the emergency department with progressively worsening shortness of breath and difficulty breathing x 2 weeks.  Differential includes worsening progressive anemia, unstable angina, new onset CHF, pneumonia, pulmonary embolism.  He is currently hemodynamically stable although tachycardic at this time, EKG done in triage shows show inversions in 2 3 and aVF, no prior to compare to.  Will give aspirin.  Will get troponin will trend EKGs.  Will send for CT pulmonary embolism study.  Evaluate BNP and x-ray to evaluate for underlying possible heart failure.  Ultimately will need admission.  At this time not requiring supplemental oxygen.  Attending Evelyne Tarango: 72yo male h/o myelofibrosis presenting with exertional sob. upon arrival ekg performed showing t wave inversions, no prior for comparison. pt denies any chest pain currently. upon arrival pt also found to be tachycardic. has a h/o anemia no black or bloody stools. concenr for possible atypical angina, vs PE, given tachcyardica and h/omyelofibrois. pocus performed showing no evdience of pericardial effusion and mildly reduced ef. plan for labs, cta, cardiac enzymes. pt will likley  need admission

## 2024-05-22 NOTE — ED PROVIDER NOTE - PROGRESS NOTE DETAILS
Attending Evelyne Tarango: Patient with mildly elevated troponin.  Patient currently denies any chest pain at this time.  Patient does endorse shortness of breath with exertion.  EKG with T wave inversions without any evidence of any ST elevation.  Unable to find any old EKGs for comparison.  Will continue to trend troponins and discussed with cardiology. Marcelino (Fellow): spoke with Odin Carrillo of cardiology, would not treat at ACS given patient asymptomatic at rest. Advises repeat trop, EKG, CKMB, CPK. will come evaluate Attending Evelyne Tarango: called by radiology pt with evidence of pulmonary embolism. pt does have a h/o anemia. denies any bolack or bloody stools. no headaches and pt denies any numbness or tingling. nonfocal ndurologic exam. will start on heparin gtt

## 2024-05-22 NOTE — ED ADULT NURSE REASSESSMENT NOTE - NS ED NURSE REASSESS COMMENT FT1
Second IV placed for additional access. Heparin bolus and infusion initiated as per orders based off Heparin nomogram. Second RN present to confirm correct medication administration. Patient currently safe and comfortable.

## 2024-05-22 NOTE — ED PROVIDER NOTE - OBJECTIVE STATEMENT
Attending Evelyne Tarango: 73-year-old male with history of anemia, currently being worked up for myelofibrosis presenting with progressively worsening shortness of breath and difficulty breathing.  Patient states symptoms started approximately 2 weeks ago.  Patient complained of cough productive of greenish sputum.  Has been noticing that when he walks he feels shortness of breath with some mild chest discomfort.  No fevers or chills.  No black or bloody stools.  No history of any anticoagulation use.  Patient denies any recent travel.  No pain in his calfs.

## 2024-05-22 NOTE — ED ADULT NURSE NOTE - OBJECTIVE STATEMENT
73y Male PMHx anemia and myelofibrosis presenting to ED via walk-in triage for worsening SOB and dyspnea. Pt states symptoms began 2w ago however has worsened since then pt states he has now developed a producitve greenish sputum cough. Pt states that when he walks he also intermittently experiences mild chest pain.  Pt denies fevers, chills, bloody/black stools, AC use, and recent travel. IV intact from QRN, medicated as ordered, seen and eval by MD. [place] in lowest position and locked.

## 2024-05-22 NOTE — ED ADULT NURSE REASSESSMENT NOTE - NS ED NURSE REASSESS COMMENT FT1
Received report from KEVIN Anders RN. Patient presenting with SOB and mild chest discomfort. Patient notes no pain at present, 0/10 in severity. AOx4 and speaking coherently. Patient pending, UA. Patient aware. Pt placed in position of comfort. Bed in lowest position, wheels locked, appropriate side rails raised. Pt denies needs at this time.

## 2024-05-22 NOTE — ED PROVIDER NOTE - NS ED MD DISPO SPECIAL CONSIDERATION1
Patient called asking to speak nurse regarding pinch nerve in neck.  Says that he   did a teledot moira with [employer] nurse line yesterday & was tod to fu w/ primary care   Patient says that it was unclear if he was suppose to make an appointment or just contact.  Patient says that is why he is calling just to update doctor and let him decide.   None

## 2024-05-22 NOTE — CONSULT NOTE ADULT - ASSESSMENT
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 Presents for worsening [place].    States that a few weeks ago he noticed gradually more difficulty climbing up hills and walking up stairs. He says that 6 months ago he could walk up 10 flights of stairs with no issues, and now even 15 steps prove difficult. No CP with exertion. No palpitations, leg swelling, dizziness, lightheadedness, syncope.    Cardiology called for abnormal EKG and elevated troponin.    At rest, he has no symptoms.    His EKG shows NSR with diffuse TWI. No prior EKGs in [facility], [facility], or [facility] available for review  Initial troponin 67, pending repeat. Pro-. Creatinine 0.99. Hgb 7.5 (was in 9s-10s range as of April, 2024).  No prior cardiac workup done. He does not have a cardiologist.    Impression/Recommendations:  - Mild troponin elevation and [place] can certainly be explained by progressively worsening anemia in the setting of myelodysplasic/myeloproliferative syndrome as above, although EKG changes is concerning for cardiac ischemia  - Assuming troponin are not rapidly uptrending, ACS is low likelihood at this time  - Trend troponin, CK-MB, CPK, and EKGs  - TTE in the am  - Can get exercise nuclear stress test for additional ischemic evaluation tomorrow  - Please consult hematology for worsening anemia in the setting of hematological disease    Note incomplete until cosigned by attending.    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 Presents for worsening [place].    States that a few weeks ago he noticed gradually more difficulty climbing up hills and walking up stairs. He says that 6 months ago he could walk up 10 flights of stairs with no issues, and now even 15 steps prove difficult. No CP with exertion. No palpitations, leg swelling, dizziness, lightheadedness, syncope.    Cardiology called for abnormal EKG and elevated troponin.    At rest, he has no symptoms.    His EKG shows NSR with diffuse TWI. No prior EKGs in [facility], [place], or [facility] available for review  Initial troponin 67, pending repeat. Pro-. Creatinine 0.99. Hgb 7.5 (was in 9s-10s range as of April, 2024).  No prior cardiac workup done. He does not have a cardiologist.    Impression/Recommendations:  - Mild troponin elevation and [place] can certainly be explained by progressively worsening anemia in the setting of myelodysplasic/myeloproliferative syndrome as above, although EKG changes is concerning for cardiac ischemia  - Assuming troponin are not rapidly uptrending, ACS is low likelihood at this time  - Trend troponin, CK-MB, CPK, and EKGs  - TTE in the am  - Can get exercise nuclear stress test for additional ischemic evaluation tomorrow  - Please consult hematology for worsening anemia in the setting of hematological disease    **Addendum: CE downtrended. CTA chest showed acute bilateral pulmonary emboli within the subsegmental arteries of all   lobes. No evidence of right heart strain or pulmonary infarct. Stared on heparin drip. Will need additional cardiac ischemic evaluation down the line, but will be delayed in the setting of acute PE**      Note incomplete until cosigned by attending.    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 Presents for worsening [place].    States that a few weeks ago he noticed gradually more difficulty climbing up hills and walking up stairs. He says that 6 months ago he could walk up 10 flights of stairs with no issues, and now even 15 steps prove difficult. No CP with exertion. No palpitations, leg swelling, dizziness, lightheadedness, syncope.    Cardiology called for abnormal EKG and elevated troponin.    At rest, he has no symptoms.    His EKG shows NSR with diffuse TWI. No prior EKGs in [facility], [place], or [facility] available for review  Initial troponin 67, pending repeat. Pro-. Creatinine 0.99. Hgb 7.5 (was in 9s-10s range as of April, 2024).  No prior cardiac workup done. He does not have a cardiologist.    Impression/Recommendations:  - Mild troponin elevation and [place] can certainly be explained by progressively worsening anemia in the setting of myelodysplasic/myeloproliferative syndrome as above, although EKG changes is concerning for cardiac ischemia  - Assuming troponin are not rapidly uptrending, ACS is low likelihood at this time  - Trend troponin, CK-MB, CPK, and EKGs  - TTE in the am  - Can get exercise nuclear stress test for additional ischemic evaluation tomorrow  - Please consult hematology for worsening anemia in the setting of hematological disease    **Addendum: CE downtrended. CTA chest showed acute bilateral pulmonary emboli within the subsegmental arteries of all   lobes. No evidence of right heart strain or pulmonary infarct. Stared on heparin drip. Will need additional cardiac ischemic evaluation down the line, but will be delayed in the setting of acute PE**      Note incomplete until cosigned by attending.    Odin Carrillo, PGY-4  Cardiology Fellow    For all new consults  [email or web address]  Login: [email or web address]    This patient was seen and examined personally by me and the plan was discussed with the fellow and/or resident above. Amendments were made as necessary to the above. Agree with the excellent note and plan above. 73M w DM2, anemia, MDS/CML here w BIRMINGHAM.  -tele  -ecg w TWI  -trend trop to peak  -TTE pending  -nuclear stress  -heme/onc recs    Bj Serrano MD, MPhil, Merged with [facility]  Cardiologist, [facility]  ; [facility] and [facility]  Email: [email or web address] Cardiology and Cardiovascular Surgery on-service contact/call information, go to [email or web address] and use "[email or web address]" to login.  Outpatient Cardiology appointments, call [phone] to arrange with a colleague; I do not have outpatient Cardiology clinic.

## 2024-05-22 NOTE — ED PROVIDER NOTE - PHYSICAL EXAMINATION
Attending Evelyne Tarango: Gen: NAD, heent: atrauamtic, eomi, perrla, mmm, op pink, uvula midline, neck; nttp, no nuchal rigidity, chest: nttp, no crepitus, cv: tachycardic +murmur, lungs: ctab, abd: soft, nontender, nondistended, no peritoneal signs,  no guarding, ext: wwp, neg homans, skin: no rash, neuro: awake and alert, following commands, speech clear, sensation and strength intact, no focal deficits

## 2024-05-22 NOTE — ED PROVIDER NOTE - RAPID ASSESSMENT
73-year-old male with past medical history of anemia (currently being worked up for myelofibrosis) presenting with shortness of breath.  Patient reports over the past 2 weeks he has had worsening shortness of breath.  Shortness of breath is worse with exertion and associated with left-sided chest tightness.     **Patient was rapidly assessed by me, Allen Conway PA-C. A limited history was obtained. The patient will be seen and further examined/worked up in the main ED and their care will be completed by the main ED team. Receiving team will follow up on labs, analgesia, any clinical imaging, and perform reassessment and disposition of the patient as clinically indicated. All decisions regarding the progression of care will be made at their discretion.

## 2024-05-22 NOTE — ED PROVIDER NOTE - ATTENDING CONTRIBUTION TO CARE
Attending MD Evelyne Tarango:  I personally have seen and examined this patient.  Fellow note reviewed and agree on plan of care and except where noted.  See HPI, PE, and MDM for details.

## 2024-05-23 ENCOUNTER — RESULT REVIEW (OUTPATIENT)
Age: 74
End: 2024-05-23

## 2024-05-23 ENCOUNTER — TRANSCRIPTION ENCOUNTER (OUTPATIENT)
Age: 74
End: 2024-05-23

## 2024-05-23 VITALS
DIASTOLIC BLOOD PRESSURE: 74 MMHG | SYSTOLIC BLOOD PRESSURE: 116 MMHG | RESPIRATION RATE: 18 BRPM | HEART RATE: 103 BPM | TEMPERATURE: 99 F | OXYGEN SATURATION: 96 %

## 2024-05-23 DIAGNOSIS — Z79.899 OTHER LONG TERM (CURRENT) DRUG THERAPY: ICD-10-CM

## 2024-05-23 DIAGNOSIS — Z29.9 ENCOUNTER FOR PROPHYLACTIC MEASURES, UNSPECIFIED: ICD-10-CM

## 2024-05-23 DIAGNOSIS — D46.9 MYELODYSPLASTIC SYNDROME, UNSPECIFIED: ICD-10-CM

## 2024-05-23 DIAGNOSIS — E11.9 TYPE 2 DIABETES MELLITUS WITHOUT COMPLICATIONS: ICD-10-CM

## 2024-05-23 DIAGNOSIS — I26.99 OTHER PULMONARY EMBOLISM WITHOUT ACUTE COR PULMONALE: ICD-10-CM

## 2024-05-23 DIAGNOSIS — R93.89 ABNORMAL FINDINGS ON DIAGNOSTIC IMAGING OF OTHER SPECIFIED BODY STRUCTURES: ICD-10-CM

## 2024-05-23 DIAGNOSIS — R94.31 ABNORMAL ELECTROCARDIOGRAM [ECG] [EKG]: ICD-10-CM

## 2024-05-23 DIAGNOSIS — J18.9 PNEUMONIA, UNSPECIFIED ORGANISM: ICD-10-CM

## 2024-05-23 LAB
ALBUMIN SERPL ELPH-MCNC: 4.4 G/DL
ALP BLD-CCNC: 85 U/L
ALT SERPL-CCNC: 17 U/L
ANION GAP SERPL CALC-SCNC: 12 MMOL/L
ANION GAP SERPL CALC-SCNC: 16 MMOL/L — SIGNIFICANT CHANGE UP (ref 5–17)
APTT BLD: 108.6 SEC — HIGH (ref 24.5–35.6)
APTT BLD: 122 SEC — CRITICAL HIGH (ref 24.5–35.6)
AST SERPL-CCNC: 20 U/L
BASOPHILS # BLD AUTO: 0.09 K/UL — SIGNIFICANT CHANGE UP (ref 0–0.2)
BASOPHILS NFR BLD AUTO: 1.8 % — SIGNIFICANT CHANGE UP (ref 0–2)
BILIRUB SERPL-MCNC: 0.9 MG/DL
BUN SERPL-MCNC: 10 MG/DL — SIGNIFICANT CHANGE UP (ref 7–23)
BUN SERPL-MCNC: 11 MG/DL
CALCIUM SERPL-MCNC: 7.8 MG/DL
CALCIUM SERPL-MCNC: 8.4 MG/DL — SIGNIFICANT CHANGE UP (ref 8.4–10.5)
CHLORIDE SERPL-SCNC: 108 MMOL/L — SIGNIFICANT CHANGE UP (ref 96–108)
CHLORIDE SERPL-SCNC: 110 MMOL/L
CK MB BLD-MCNC: 1.1 % — SIGNIFICANT CHANGE UP (ref 0–3.5)
CK MB BLD-MCNC: 1.2 % — SIGNIFICANT CHANGE UP (ref 0–3.5)
CK MB BLD-MCNC: 1.3 % — SIGNIFICANT CHANGE UP (ref 0–3.5)
CK MB CFR SERPL CALC: 2.1 NG/ML — SIGNIFICANT CHANGE UP (ref 0–6.7)
CK MB CFR SERPL CALC: 2.3 NG/ML — SIGNIFICANT CHANGE UP (ref 0–6.7)
CK MB CFR SERPL CALC: 2.4 NG/ML — SIGNIFICANT CHANGE UP (ref 0–6.7)
CK SERPL-CCNC: 186 U/L — SIGNIFICANT CHANGE UP (ref 30–200)
CK SERPL-CCNC: 189 U/L — SIGNIFICANT CHANGE UP (ref 30–200)
CK SERPL-CCNC: 191 U/L — SIGNIFICANT CHANGE UP (ref 30–200)
CK SERPL-CCNC: 269 U/L
CO2 SERPL-SCNC: 18 MMOL/L
CO2 SERPL-SCNC: 18 MMOL/L — LOW (ref 22–31)
CREAT SERPL-MCNC: 0.82 MG/DL
CREAT SERPL-MCNC: 0.94 MG/DL — SIGNIFICANT CHANGE UP (ref 0.5–1.3)
EGFR: 85 ML/MIN/1.73M2 — SIGNIFICANT CHANGE UP
EGFR: 92 ML/MIN/1.73M2
EOSINOPHIL # BLD AUTO: 0.06 K/UL — SIGNIFICANT CHANGE UP (ref 0–0.5)
EOSINOPHIL NFR BLD AUTO: 1.2 % — SIGNIFICANT CHANGE UP (ref 0–6)
GLUCOSE BLDC GLUCOMTR-MCNC: 113 MG/DL — HIGH (ref 70–99)
GLUCOSE BLDC GLUCOMTR-MCNC: 165 MG/DL — HIGH (ref 70–99)
GLUCOSE BLDC GLUCOMTR-MCNC: 177 MG/DL — HIGH (ref 70–99)
GLUCOSE SERPL-MCNC: 144 MG/DL — HIGH (ref 70–99)
GLUCOSE SERPL-MCNC: 194 MG/DL
HCT VFR BLD CALC: 24.4 % — LOW (ref 39–50)
HGB BLD-MCNC: 7.6 G/DL — LOW (ref 13–17)
IMM GRANULOCYTES NFR BLD AUTO: 2.8 % — HIGH (ref 0–0.9)
LDH SERPL-CCNC: 754 U/L
LYMPHOCYTES # BLD AUTO: 1 K/UL — SIGNIFICANT CHANGE UP (ref 1–3.3)
LYMPHOCYTES # BLD AUTO: 19.6 % — SIGNIFICANT CHANGE UP (ref 13–44)
MAGNESIUM SERPL-MCNC: 2.1 MG/DL — SIGNIFICANT CHANGE UP (ref 1.6–2.6)
MCHC RBC-ENTMCNC: 25.8 PG — LOW (ref 27–34)
MCHC RBC-ENTMCNC: 31.1 GM/DL — LOW (ref 32–36)
MCV RBC AUTO: 82.7 FL — SIGNIFICANT CHANGE UP (ref 80–100)
MONOCYTES # BLD AUTO: 0.5 K/UL — SIGNIFICANT CHANGE UP (ref 0–0.9)
MONOCYTES NFR BLD AUTO: 9.8 % — SIGNIFICANT CHANGE UP (ref 2–14)
MRSA PCR RESULT.: SIGNIFICANT CHANGE UP
NEUTROPHILS # BLD AUTO: 3.3 K/UL — SIGNIFICANT CHANGE UP (ref 1.8–7.4)
NEUTROPHILS NFR BLD AUTO: 64.8 % — SIGNIFICANT CHANGE UP (ref 43–77)
NRBC # BLD: 10 /100 WBCS — HIGH (ref 0–0)
NT-PROBNP SERPL-MCNC: 719 PG/ML
PHOSPHATE SERPL-MCNC: 1.2 MG/DL — LOW (ref 2.5–4.5)
PLATELET # BLD AUTO: 609 K/UL — HIGH (ref 150–400)
POTASSIUM SERPL-MCNC: 4.4 MMOL/L — SIGNIFICANT CHANGE UP (ref 3.5–5.3)
POTASSIUM SERPL-SCNC: 4.2 MMOL/L
POTASSIUM SERPL-SCNC: 4.4 MMOL/L — SIGNIFICANT CHANGE UP (ref 3.5–5.3)
PROT SERPL-MCNC: 6.9 G/DL
RBC # BLD: 2.95 M/UL — LOW (ref 4.2–5.8)
RBC # FLD: 30.8 % — HIGH (ref 10.3–14.5)
S AUREUS DNA NOSE QL NAA+PROBE: SIGNIFICANT CHANGE UP
SODIUM SERPL-SCNC: 141 MMOL/L
SODIUM SERPL-SCNC: 142 MMOL/L — SIGNIFICANT CHANGE UP (ref 135–145)
TROPONIN I SERPL-MCNC: 0.18 NG/ML
TROPONIN T, HIGH SENSITIVITY RESULT: 52 NG/L — HIGH (ref 0–51)
TROPONIN T, HIGH SENSITIVITY RESULT: 61 NG/L — HIGH (ref 0–51)
TROPONIN T, HIGH SENSITIVITY RESULT: 62 NG/L — HIGH (ref 0–51)
TROPONIN-T, HIGH SENSITIVITY: 58 NG/L
URATE SERPL-MCNC: 6.6 MG/DL
WBC # BLD: 5.09 K/UL — SIGNIFICANT CHANGE UP (ref 3.8–10.5)
WBC # FLD AUTO: 5.09 K/UL — SIGNIFICANT CHANGE UP (ref 3.8–10.5)

## 2024-05-23 PROCEDURE — 96374 THER/PROPH/DIAG INJ IV PUSH: CPT

## 2024-05-23 PROCEDURE — 93306 TTE W/DOPPLER COMPLETE: CPT | Mod: 26

## 2024-05-23 PROCEDURE — 93356 MYOCRD STRAIN IMG SPCKL TRCK: CPT

## 2024-05-23 PROCEDURE — 99285 EMERGENCY DEPT VISIT HI MDM: CPT

## 2024-05-23 PROCEDURE — 84484 ASSAY OF TROPONIN QUANT: CPT

## 2024-05-23 PROCEDURE — 71046 X-RAY EXAM CHEST 2 VIEWS: CPT

## 2024-05-23 PROCEDURE — 83880 ASSAY OF NATRIURETIC PEPTIDE: CPT

## 2024-05-23 PROCEDURE — 99222 1ST HOSP IP/OBS MODERATE 55: CPT | Mod: GC

## 2024-05-23 PROCEDURE — 87640 STAPH A DNA AMP PROBE: CPT

## 2024-05-23 PROCEDURE — 87040 BLOOD CULTURE FOR BACTERIA: CPT

## 2024-05-23 PROCEDURE — 84295 ASSAY OF SERUM SODIUM: CPT

## 2024-05-23 PROCEDURE — 71275 CT ANGIOGRAPHY CHEST: CPT | Mod: MC

## 2024-05-23 PROCEDURE — 82435 ASSAY OF BLOOD CHLORIDE: CPT

## 2024-05-23 PROCEDURE — 84100 ASSAY OF PHOSPHORUS: CPT

## 2024-05-23 PROCEDURE — 82962 GLUCOSE BLOOD TEST: CPT

## 2024-05-23 PROCEDURE — 85730 THROMBOPLASTIN TIME PARTIAL: CPT

## 2024-05-23 PROCEDURE — 93005 ELECTROCARDIOGRAM TRACING: CPT

## 2024-05-23 PROCEDURE — 85025 COMPLETE CBC W/AUTO DIFF WBC: CPT

## 2024-05-23 PROCEDURE — 85610 PROTHROMBIN TIME: CPT

## 2024-05-23 PROCEDURE — 85379 FIBRIN DEGRADATION QUANT: CPT

## 2024-05-23 PROCEDURE — 80053 COMPREHEN METABOLIC PANEL: CPT

## 2024-05-23 PROCEDURE — 83605 ASSAY OF LACTIC ACID: CPT

## 2024-05-23 PROCEDURE — 84132 ASSAY OF SERUM POTASSIUM: CPT

## 2024-05-23 PROCEDURE — 99223 1ST HOSP IP/OBS HIGH 75: CPT

## 2024-05-23 PROCEDURE — 82550 ASSAY OF CK (CPK): CPT

## 2024-05-23 PROCEDURE — 87641 MR-STAPH DNA AMP PROBE: CPT

## 2024-05-23 PROCEDURE — 83735 ASSAY OF MAGNESIUM: CPT

## 2024-05-23 PROCEDURE — 93308 TTE F-UP OR LMTD: CPT

## 2024-05-23 PROCEDURE — 85014 HEMATOCRIT: CPT

## 2024-05-23 PROCEDURE — 80048 BASIC METABOLIC PNL TOTAL CA: CPT

## 2024-05-23 PROCEDURE — 93306 TTE W/DOPPLER COMPLETE: CPT

## 2024-05-23 PROCEDURE — 81001 URINALYSIS AUTO W/SCOPE: CPT

## 2024-05-23 PROCEDURE — 86923 COMPATIBILITY TEST ELECTRIC: CPT

## 2024-05-23 PROCEDURE — 82330 ASSAY OF CALCIUM: CPT

## 2024-05-23 PROCEDURE — 85018 HEMOGLOBIN: CPT

## 2024-05-23 PROCEDURE — 82803 BLOOD GASES ANY COMBINATION: CPT

## 2024-05-23 PROCEDURE — 82553 CREATINE MB FRACTION: CPT

## 2024-05-23 PROCEDURE — 82947 ASSAY GLUCOSE BLOOD QUANT: CPT

## 2024-05-23 RX ORDER — ASPIRIN/CALCIUM CARB/MAGNESIUM 324 MG
1 TABLET ORAL
Refills: 0 | DISCHARGE

## 2024-05-23 RX ORDER — IMATINIB MESYLATE 400 MG/1
1 TABLET, FILM COATED ORAL
Refills: 0 | DISCHARGE

## 2024-05-23 RX ORDER — LATANOPROST 0.05 MG/ML
1 SOLUTION/ DROPS OPHTHALMIC; TOPICAL
Refills: 0 | DISCHARGE

## 2024-05-23 RX ORDER — POTASSIUM PHOSPHATE, MONOBASIC POTASSIUM PHOSPHATE, DIBASIC 236; 224 MG/ML; MG/ML
30 INJECTION, SOLUTION INTRAVENOUS ONCE
Refills: 0 | Status: DISCONTINUED | OUTPATIENT
Start: 2024-05-23 | End: 2024-05-23

## 2024-05-23 RX ORDER — DORZOLAMIDE HYDROCHLORIDE TIMOLOL MALEATE 20; 5 MG/ML; MG/ML
1 SOLUTION/ DROPS OPHTHALMIC
Refills: 0 | DISCHARGE

## 2024-05-23 RX ORDER — APIXABAN 2.5 MG/1
2 TABLET, FILM COATED ORAL
Qty: 120 | Refills: 0
Start: 2024-05-23 | End: 2024-06-21

## 2024-05-23 RX ORDER — DEXTROSE 50 % IN WATER 50 %
15 SYRINGE (ML) INTRAVENOUS ONCE
Refills: 0 | Status: DISCONTINUED | OUTPATIENT
Start: 2024-05-23 | End: 2024-05-23

## 2024-05-23 RX ORDER — SODIUM CHLORIDE 9 MG/ML
1000 INJECTION, SOLUTION INTRAVENOUS
Refills: 0 | Status: DISCONTINUED | OUTPATIENT
Start: 2024-05-23 | End: 2024-05-23

## 2024-05-23 RX ORDER — INSULIN LISPRO 100/ML
VIAL (ML) SUBCUTANEOUS AT BEDTIME
Refills: 0 | Status: DISCONTINUED | OUTPATIENT
Start: 2024-05-23 | End: 2024-05-23

## 2024-05-23 RX ORDER — ANAGRELIDE HCL 0.5 MG
1 CAPSULE ORAL
Refills: 0 | DISCHARGE

## 2024-05-23 RX ORDER — GLUCAGON INJECTION, SOLUTION 0.5 MG/.1ML
1 INJECTION, SOLUTION SUBCUTANEOUS ONCE
Refills: 0 | Status: DISCONTINUED | OUTPATIENT
Start: 2024-05-23 | End: 2024-05-23

## 2024-05-23 RX ORDER — INSULIN LISPRO 100/ML
VIAL (ML) SUBCUTANEOUS
Refills: 0 | Status: DISCONTINUED | OUTPATIENT
Start: 2024-05-23 | End: 2024-05-23

## 2024-05-23 RX ORDER — DEXTROSE 50 % IN WATER 50 %
12.5 SYRINGE (ML) INTRAVENOUS ONCE
Refills: 0 | Status: DISCONTINUED | OUTPATIENT
Start: 2024-05-23 | End: 2024-05-23

## 2024-05-23 RX ORDER — RUXOLITINIB 25 MG/1
1 TABLET ORAL
Refills: 0 | DISCHARGE

## 2024-05-23 RX ORDER — CEFTRIAXONE 500 MG/1
INJECTION, POWDER, FOR SOLUTION INTRAMUSCULAR; INTRAVENOUS
Refills: 0 | Status: DISCONTINUED | OUTPATIENT
Start: 2024-05-23 | End: 2024-05-23

## 2024-05-23 RX ORDER — DEXTROSE 50 % IN WATER 50 %
25 SYRINGE (ML) INTRAVENOUS ONCE
Refills: 0 | Status: DISCONTINUED | OUTPATIENT
Start: 2024-05-23 | End: 2024-05-23

## 2024-05-23 RX ORDER — DEXTROSE 10 % IN WATER 10 %
125 INTRAVENOUS SOLUTION INTRAVENOUS ONCE
Refills: 0 | Status: DISCONTINUED | OUTPATIENT
Start: 2024-05-23 | End: 2024-05-23

## 2024-05-23 RX ORDER — ASPIRIN/CALCIUM CARB/MAGNESIUM 324 MG
81 TABLET ORAL DAILY
Refills: 0 | Status: DISCONTINUED | OUTPATIENT
Start: 2024-05-23 | End: 2024-05-23

## 2024-05-23 RX ORDER — CEFTRIAXONE 500 MG/1
1000 INJECTION, POWDER, FOR SOLUTION INTRAMUSCULAR; INTRAVENOUS ONCE
Refills: 0 | Status: COMPLETED | OUTPATIENT
Start: 2024-05-23 | End: 2024-05-23

## 2024-05-23 RX ADMIN — HEPARIN SODIUM 1100 UNIT(S)/HR: 5000 INJECTION INTRAVENOUS; SUBCUTANEOUS at 06:04

## 2024-05-23 RX ADMIN — CEFTRIAXONE 100 MILLIGRAM(S): 500 INJECTION, POWDER, FOR SOLUTION INTRAMUSCULAR; INTRAVENOUS at 05:48

## 2024-05-23 RX ADMIN — HEPARIN SODIUM 1100 UNIT(S)/HR: 5000 INJECTION INTRAVENOUS; SUBCUTANEOUS at 07:09

## 2024-05-23 RX ADMIN — HEPARIN SODIUM 1000 UNIT(S)/HR: 5000 INJECTION INTRAVENOUS; SUBCUTANEOUS at 12:44

## 2024-05-23 RX ADMIN — Medication 81 MILLIGRAM(S): at 11:56

## 2024-05-23 NOTE — DISCHARGE NOTE PROVIDER - NSFOLLOWUPCLINICS_GEN_ALL_ED_FT
Cardiology at [facility]  Cardiology  [address] [place] [address]  Phone: [phone]  Fax:   Follow Up Time: 1 week

## 2024-05-23 NOTE — DISCHARGE NOTE PROVIDER - HOSPITAL COURSE
HPI:  73M PMH T2DM, lyme dz, chronic formication, glaucoma, chronic venous insufficiency, MDS/MPN/CML (on luspatercept, jakafi, anagrelide, ASA, imatinib), p/w B[place]. Reports baseline health when 3 wks ago he began to experience B[place], and 3 days ago began to experience cough w/ clear sputum production. Denies fever, chills, HA, dizziness, CP, palpitations, abd pain, n/v/d, constipation, melena/hematochezia, dysuria/hematuria, numbness/weakness/tingling, or other complaint. Reports that he is employed as , seated for prolonged periods. Denies personal or FHx of VTE. Denies sick contact or recent travel. At rest in ED he states that he feels generally well, eager to be discharged. Of note, per [facility] pt evaluated by christian 5/20 for B[place], L CP w/ exertion, eventually referred to ED. Pt does not recall medication list.    HR 80s-110s, BP 100s-150s/50s-70s  band neutrophil % 10.6, H/H 7.5/23.8 (normocytic), ; PT/INR prolonged; DDimer 1010; HST 67 -> 54 -> 52,  -> 191, CKMB units 1.6 -> 2.3, CPK Mass Assay% 0.7 -> 1.2, proBNP 520; Ca 7.8 corrected; VBG 7.31/38/23/19 lact 2.1   UA proteinuria  CXR clear lungs (prelim)  POCUS ED TTE no pericardial effusion  CTA chest PE protocol [facility] Acute bilateral pulmonary emboli within the subsegmental arteries of all lobes. No evidence of right heart strain or pulmonary infarct.  s/p initiation of hep gtt, ASA 324mg PO    Evaluated by cardiology, ACS low likelihood, recommend trend Benoit, EKGs, obtain TTE, can get exercise nuclear stress for additional ischemic evaluation, c/s heme for worsened anemia      Hospital Course:  Pt presented with [place] for weeks, frequently sedentary as taxi- also w/ hx MDS/MPN/CML as below. Imaging revealed b/l w/i subsegmental arteries all lobes w/o RHS on CT or pulm infarct. Pt started on hep gtt per cardiology. Transitioned to eliquis upon discharge. Pt c/o exertional chest pain, denied on interview currently, EKG w/ diffuse TWI. Pts trop seen downtrending. TTE unremarkable. Ambulation sat on RA 95%. No further inpt cards testing/workup needed per cardiology and to follow up as outpatient. Pt met SIRS criteria with tachycardia and band neutrophils. Has cough and green sputum. Imaging negative for consolidation. Started on empiric CTX, now discontinued as tachycardia/SOB are in setting of new PE. Heme consulted, pt with hx of myelodysplastic/myeloproliferative neoplasms. Pt evaled by heme and rec to Dc on AC with close outpt follow up with heme Dr. Mayers and NP Kayleen. To continue home meds as prescribed. Pt to also follow up with PCP as outpatient.     Important Medication Changes and Reason:  - Started on eliquis- to take 10 mg twice daily x7 days --> then drop to 5 mg twice daily thereafter     Active or Pending Issues Requiring Follow-up:  - Follow up with PCP   - Follow up with Cardiology   - Follow up with Hematology Dr. Mayers     Advanced Directives:   [X] Full code  [ ] DNR  [ ] Hospice    Discharge Diagnoses:  - Bilateral PE   - Hx MDS/MDN       Discharge/Dispo/Med rec discussed with attending Dr. Sidhu. Patient medically cleared for discharge Home with outpatient follow up with PCP, cardiology and hematology

## 2024-05-23 NOTE — H&P ADULT - PROBLEM SELECTOR PLAN 2
reported exertional CP to cardiology, denies on interview currently   EKG w/ diffuse TWI  abn trop, downtrending  cards on board  - cont to trend Benoit  - TTE and tele as above  - consider nuclear stress in AM, will await finalized cards recs reported exertional CP to cardiology, denies on interview currently   EKG w/ diffuse TWI  abn trop, downtrending  cards on board  - cont to trend Benoit, rpt EKG this AM   - TTE and tele as above  - consider nuclear stress in AM, will await finalized cards recs

## 2024-05-23 NOTE — DISCHARGE NOTE PROVIDER - NSDCCPCAREPLAN_GEN_ALL_CORE_FT
PRINCIPAL DISCHARGE DIAGNOSIS  Diagnosis: Pulmonary embolism  Assessment and Plan of Treatment: Take your anticoagulation (eliquis) as directed.  Follow up with your health care provider within one week. Call for appointment.  If you develop shortness of breath or if your shortness of breath worsens call your Health Care Provider or go to the Emergency Department.  follow up with cardiology outpt         SECONDARY DISCHARGE DIAGNOSES  Diagnosis: MDS/MPN (myelodysplastic/myeloproliferative neoplasms)  Assessment and Plan of Treatment: continue medications as prescribed  follow up with hematology outpt

## 2024-05-23 NOTE — H&P ADULT - NSHPPHYSICALEXAM_GEN_ALL_CORE
PHYSICAL EXAM:  GENERAL: NAD, well-groomed, well-developed, pleasant to interview  HEAD: Atraumatic, Normocephalic  EYES: PERRL, conjunctiva and sclera clear  ENMT: No tonsillar erythema, exudates, or enlargement; Moist mucous membranes  NECK: Supple w/o JVD  NERVOUS SYSTEM: Alert & Oriented X4, Good concentration; Motor Strength 5/5 B/L upper and lower extremities  CHEST/LUNG: Clear to auscultation bilaterally; No rales, rhonchi, wheezing, or rubs +coughing during interview/exam   HEART: Regular rate and rhythm; No murmurs, rubs, or gallops  ABDOMEN: Soft, Nontender, Nondistended; Bowel sounds present. No guarding, rebound tenderness, or rigidity.  EXTREMITIES: 2+ Peripheral Pulses, No edema/warmth/erythema/tenderness to palpation b/l LE

## 2024-05-23 NOTE — H&P ADULT - PROBLEM SELECTOR PLAN 4
CML  w/ normocytic anemia, thrombocytosis  on luspatercept, jakafi, anagrelide, ASA, imatinib followed by NP Kayleen, Dr. Mayers   - christian aware of arrival, contact in AM   - trend CBC  - repeating coags per hep protocol (abn on admit)

## 2024-05-23 NOTE — CONSULT NOTE ADULT - ATTENDING COMMENTS
73-yr-old man with h/o SF3B! positive MDS-RS, JAK2 positive PMF and BRC::ABL positive CML seen in consult in the ED as he was sent from clinic for SOB, positive troponin. He is found to have PE. HE was started on Heparin gtt in the ED. Patient may be discharged with loading dose DOAC and outpatient f/u appointment.

## 2024-05-23 NOTE — DISCHARGE NOTE PROVIDER - CARE PROVIDER_API CALL
Jose Mayers [facility]  Medical Oncology  [address] [place] [address]  Phone: [phone]  Fax: [phone]  Follow Up Time:

## 2024-05-23 NOTE — DISCHARGE NOTE NURSING/CASE MANAGEMENT/SOCIAL WORK - NSDCPEELIQUIS_GEN_ALL_CORE
Apixaban/Eliquis - Compliance/Apixaban/Eliquis - Dietary Advice/Apixaban/Eliquis - Follow up monitoring/Apixaban/Eliquis - Potential for adverse drug reactions and interactions Quality 110: Preventive Care And Screening: Influenza Immunization: Influenza Immunization Administered during Influenza season

## 2024-05-23 NOTE — H&P ADULT - PROBLEM SELECTOR PLAN 7
provide full data on d/c for PCP/specialist f/u of abn findings including/not limited to:  - Acute bilateral pulmonary emboli within the subsegmental arteries of all lobes; Coronary artery calcifications; Micronodule right upper lobe (5-41). Trace left pleural effusion with associated passive atelectasis.

## 2024-05-23 NOTE — H&P ADULT - PROBLEM SELECTOR PLAN 1
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

## 2024-05-23 NOTE — DISCHARGE NOTE NURSING/CASE MANAGEMENT/SOCIAL WORK - PATIENT PORTAL LINK FT
You can access the FollowMyHealth Patient Portal offered by [facility] by registering at the following website: [email or web address]. By joining [facility]’s FollowMyHealth portal, you will also be able to view your health information using other applications (apps) compatible with our system.

## 2024-05-23 NOTE — H&P ADULT - NSHPLABSRESULTS_GEN_ALL_CORE
< from: POCUS ED TTE 2D F/U, Limited w/o Cont. (05.22.24 @ 22:57) >    INTERPRETATION:  A focused transthoracic cardiac ultrasound examination   was performed.  technically limited  No pericardial effusion was present.  No global wall motion abnormality was identified  A line predominant lung fields  No evidence of right ventricular enlargement    IMPRESSION:  No Pericardial Effusion.    --- End of Report --    < end of copied text >    < from: CT Angio Chest PE Protocol w/ IV Cont (05.22.24 @ 22:00) >    FINDINGS:    PULMONARY ANGIOGRAM: Acute bilateral pulmonary emboli within the   subsegmental arteries of all lobes.  LYMPH NODES: No lymphadenopathy.  HEART/VASCULATURE: Heart size is normal. No pericardial effusion.   Coronary artery calcifications.  AIRWAYS/LUNGS/PLEURA: Patent central airways. No pulmonary infarct. No   consolidation. Micronodule right upper lobe (5-41). Trace left pleural   effusion with associated passive atelectasis.  UPPER ABDOMEN: Unremarkable.  BONES/SOFT TISSUES: Bones are unremarkable. Soft tissues are unremarkable.    IMPRESSION:  Acute bilateral pulmonary emboli within the subsegmental arteries of all   lobes. No evidence of right heart strain or pulmonary infarct.    Findings regarding pulmonary emboli were discussed by Dr. Montanez with   Dr. Tarango on 5/22/2024 at 10:35 PM.    --- End of Report ---    < end of copied text > < from: POCUS ED TTE 2D F/U, Limited w/o Cont. (05.22.24 @ 22:57) >    INTERPRETATION:  A focused transthoracic cardiac ultrasound examination   was performed.  technically limited  No pericardial effusion was present.  No global wall motion abnormality was identified  A line predominant lung fields  No evidence of right ventricular enlargement    IMPRESSION:  No Pericardial Effusion.    --- End of Report --    < end of copied text >    < from: CT Angio Chest PE Protocol w/ IV Cont (05.22.24 @ 22:00) >    FINDINGS:    PULMONARY ANGIOGRAM: Acute bilateral pulmonary emboli within the   subsegmental arteries of all lobes.  LYMPH NODES: No lymphadenopathy.  HEART/VASCULATURE: Heart size is normal. No pericardial effusion.   Coronary artery calcifications.  AIRWAYS/LUNGS/PLEURA: Patent central airways. No pulmonary infarct. No   consolidation. Micronodule right upper lobe (5-41). Trace left pleural   effusion with associated passive atelectasis.  UPPER ABDOMEN: Unremarkable.  BONES/SOFT TISSUES: Bones are unremarkable. Soft tissues are unremarkable.    IMPRESSION:  Acute bilateral pulmonary emboli within the subsegmental arteries of all   lobes. No evidence of right heart strain or pulmonary infarct.    Findings regarding pulmonary emboli were discussed by Dr. Montanez with   Dr. Tarango on 5/22/2024 at 10:35 PM.    --- End of Report ---    < end of copied text >    EKG NSR 87BPM, QTc 476ms, diffuse TWI

## 2024-05-23 NOTE — CONSULT NOTE ADULT - ASSESSMENT
73M PMH T2DM, lyme dz, chronic formication, glaucoma, chronic venous insufficiency, MDS/MPN/CML (on luspatercept, jakafi, anagrelide, ASA, imatinib), p/w [place]. Patient sent in for elevated troponin level.        #  MDS/MPN/CML   - He was taking Imatinib 400mg daily and Jakafi 25mg BID -> has progressive anemia  -  He has been experiencing SOB with exertion since starting 25mg Jakafi however, this is likely due to PE  - His baseline Hgb harbors anywhere from 8-10 on Luspatercept however he self-discontinued it (last dose was April 17th). He did receive an increased titrated dose of 1.75mg/kg on 5/20.   - Continue with imatinib and jakafi      # PE  - 5/22 CTA chest: Acute bilateral pulmonary emboli within the subsegmental arteries of all lobes. No evidence of right heart strain or pulmonary infarct.  - EKG outpatient revealed NSR with t wave abnormality and mildly elevated troponin level of 58.   - Patient will need indefinite anticoagulation  - If patient is stable, ok to d/c with eliquis and follow up outpatient.

## 2024-05-23 NOTE — DISCHARGE NOTE PROVIDER - NSDCMRMEDTOKEN_GEN_ALL_CORE_FT
anagrelide 0.5 mg oral capsule: 1 cap(s) orally 2 times a day  aspirin 81 mg oral tablet: 1 tab(s) orally 2 times a day  dorzolamide-timolol 2.23%-0.68% (2%-0.5% base) ophthalmic solution: 1 drop(s) in each eye 2 times a day  Eliquis Starter Pack for Treatment of DVT and PE 5 mg oral tablet: 2 tab(s) orally 2 times a day Take Starter pack as follows:  Take 10 mg (2 tabs) twice daily x7 days then DECREASE to 5 mg (1 tab)  twice daily thereafter  imatinib 400 mg oral tablet: 1 tab(s) orally once a day  Jakafi 15 mg oral tablet: 1 tab(s) orally 2 times a day  latanoprost 0.005% ophthalmic solution: 1 drop(s) in each eye once a day (at bedtime)

## 2024-05-23 NOTE — H&P ADULT - PROBLEM SELECTOR PLAN 3
SIRS mets w/ tachycardia and band neutrophils  c/o cough w/ green sputum  imaging w/o consolidation  - empiric CTX, hold azithro given QTc prolongation, obtain Cx/MRSA/legionella/strep  - VS q4h, monitor temp/WBC curve  - rpt lactate in AM given elevated SIRS mets w/ tachycardia and band neutrophils  c/o cough w/ green sputum  imaging w/o consolidation  - empiric CTX (pt reports mucous in throat as PCN allergy, denies hx anaphylaxis/throat closure/hives, will monitor for rxn), hold azithro given QTc prolongation, obtain Cx/MRSA/legionella/strep  - VS q4h, monitor temp/WBC curve  - rpt lactate in AM given elevated

## 2024-05-23 NOTE — H&P ADULT - NSICDXPASTMEDICALHX_GEN_ALL_CORE_FT
PAST MEDICAL HISTORY:  MDS/MPN (myelodysplastic/myeloproliferative neoplasms)     Type 2 diabetes mellitus      PAST MEDICAL HISTORY:  Formication     Glaucoma     H/o Lyme disease     MDS/MPN (myelodysplastic/myeloproliferative neoplasms)     Type 2 diabetes mellitus     Venous insufficiency

## 2024-05-23 NOTE — CHART NOTE - NSCHARTNOTEFT_GEN_A_CORE
73M PMH T2DM, lyme dz, chronic formication, glaucoma, chronic venous insufficiency, MDS/MPN/CML (on luspatercept, jakafi, anagrelide, ASA, imatinib), p/w [place] found to have b/l PE w/o RHS     Patient seen and examined at bedside. He denies any Complaints. Denies COUGH, CP, SOB , no acute events overnight     # Bilateral pulmonary embolism.   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, frequently sedentary as taxi- also w/ hx MDS/MPN/CML as below  - c/w hep gtt, transition to DOAC (Eliquis price check)   - ECHO unremarkable   - no events on tele  - Ambulation O2 on RA: 95%  - Per cards hold off on further ischemic work up given new PE, rec to follow up outpt   **does not have s/s of infection CT chest WITHOUT consolidation   - Antibiotics d/c'ed, symptoms of tachycardia and SOB are from new PE   - hematology consulted    Dispo: home pending Hematology eval and Eliquis price check     d/w Medicine ACP Felicia 73M PMH T2DM, lyme dz, chronic formication, glaucoma, chronic venous insufficiency, MDS/MPN/CML (on luspatercept, jakafi, anagrelide, ASA, imatinib), p/w [place] found to have b/l PE w/o RHS     Patient seen and examined at bedside. He denies any Complaints. Denies COUGH, CP, SOB , no acute events overnight     # Bilateral pulmonary embolism.   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, frequently sedentary as taxi- also w/ hx MDS/MPN/CML as below  - c/w hep gtt, transition to DOAC (Eliquis price check)   - ECHO unremarkable   - no events on tele  - Ambulation O2 on RA: 95%  - Per cards hold off on further ischemic work up given new PE, rec to follow up outpt   **does not have s/s of infection CT chest WITHOUT consolidation   - Antibiotics d/c'ed, symptoms of tachycardia and SOB are from new PE   - hematology consulted    Dispo: home pending Hematology eval and Eliquis price check     d/w Medicine ACP Felicia    Based on my assessment, this patient’s condition has improved and no longer warrants inpatient status and will be changed to outpatient status prior to being discharged from the hospital.  This decision is based on the following reasons: Patient is satting well on RA at rest and ambulation and successfully transition to oral Anticoagulants which is covered by this insurance and hematology is in agreement with this plan

## 2024-05-23 NOTE — DISCHARGE NOTE NURSING/CASE MANAGEMENT/SOCIAL WORK - NSDCFUADDAPPT_GEN_ALL_CORE_FT
APPTS ARE READY TO BE MADE: [X] YES    Best Family or Patient Contact (if needed):    Additional Information about above appointments (if needed):    1: Follow up with PCP in 1 week  2: Follow up with cardiology clinic in 1 week   3: Follow up with Hematology Dr. Mayers in 1-2 weeks     Other comments or requests:

## 2024-05-23 NOTE — H&P ADULT - PROBLEM SELECTOR PLAN 6
- pt unable to recall medication list and unsure re recent titration of heme meds (states heme meds are only meds he takes), will need to contact PCP/heme for collateral in AM to restart if appropriate - pt unable to recall medication list and unsure re recent titration of heme meds (states heme meds are only meds he takes), will need to contact PCP/heme for collateral in AM to restart if appropriate  - is sure that he takes ASA 81mg QD, received ASA 325mg in ED will restart home dose tomorrow

## 2024-05-23 NOTE — H&P ADULT - ASSESSMENT
73M PMH T2DM, lyme dz, chronic formication, glaucoma, chronic venous insufficiency, MDS/MPN/CML (on luspatercept, jakafi, anagrelide, ASA, imatinib), p/w [place] found to have b/l PE w/o RHS and meeting SIRS criteria (including elevated band neutrophils) with new cough c/f CAP

## 2024-05-23 NOTE — DISCHARGE NOTE PROVIDER - NSDCFUADDAPPT_GEN_ALL_CORE_FT
APPTS ARE READY TO BE MADE: [X] YES    Best Family or Patient Contact (if needed):    Additional Information about above appointments (if needed):    1: Follow up with PCP in 1 week  2: Follow up with cardiology clinic in 1 week   3: Follow up with Hematology Dr. Mayers in 1-2 weeks     Other comments or requests:    APPTS ARE READY TO BE MADE: [X] YES    Best Family or Patient Contact (if needed):    Additional Information about above appointments (if needed):    1: Follow up with PCP in 1 week  2: Follow up with cardiology clinic in 1 week   3: Follow up with Hematology Dr. Mayers in 1-2 weeks     Other comments or requests:   Patient was outreached but did not answer nor could a voicemail be left.

## 2024-05-23 NOTE — H&P ADULT - HISTORY OF PRESENT ILLNESS
73M PMH T2DM, MDS/MPN, p/w dyspnea    HR 80s-110s, BP 100s-150s/50s-70s  band neutrophil % 10.6, H/H 7.5/23.8 (normocytic), ; PT/INR prolonged; DDimer 1010; HST 67 -> 54 -> 52,  -> 191, CKMB units 1.6 -> 2.3, CPK Mass Assay% 0.7 -> 1.2, proBNP 520; Ca 7.8 corrected; VBG 7.31/38/23/19 lact 2.1   UA proteinuria  CXR clear lungs (prelim)  POCUS ED TTE no pericardial effusion  CTA chest PE protocol [facility]IC Acute bilateral pulmonary emboli within the subsegmental arteries of all lobes. No evidence of right heart strain or pulmonary infarct.  s/p initiation of hep gtt, ASA 324mg PO    Evaluated by cardiology, ACS low likelihood, recommend trend Benoit, EKGs, obtain TTE, can get exercise nuclear stress for additional ischemic evaluation, c/s heme for worsened anemia    admit to medicine for further eval/mgt 73M PMH T2DM, lyme dz, chronic formication, glaucoma, chronic venous insufficiency, MDS/MPN/CML (on luspatercept, jakafi, anagrelide, ASA, imatinib), p/w [place]. Of note evaluated by heme 5/20 for [place], L CP w/ exertion x 2 wks. Reports cough w/ green sputum.     HR 80s-110s, BP 100s-150s/50s-70s  band neutrophil % 10.6, H/H 7.5/23.8 (normocytic), ; PT/INR prolonged; DDimer 1010; HST 67 -> 54 -> 52,  -> 191, CKMB units 1.6 -> 2.3, CPK Mass Assay% 0.7 -> 1.2, proBNP 520; Ca 7.8 corrected; VBG 7.31/38/23/19 lact 2.1   UA proteinuria  CXR clear lungs (prelim)  POCUS ED TTE no pericardial effusion  CTA chest PE protocol WAWI Acute bilateral pulmonary emboli within the subsegmental arteries of all lobes. No evidence of right heart strain or pulmonary infarct.  s/p initiation of hep gtt, ASA 324mg PO    Evaluated by cardiology, ACS low likelihood, recommend trend Benoit, EKGs, obtain TTE, can get exercise nuclear stress for additional ischemic evaluation, c/s heme for worsened anemia    admit to medicine for further eval/mgt 73M PMH T2DM, lyme dz, chronic formication, glaucoma, chronic venous insufficiency, MDS/MPN/CML (on luspatercept, jakafi, anagrelide, ASA, imatinib), p/w [place]. Reports baseline health when 3 wks ago he began to experience [place], and 3 days ago began to experience cough w/ clear sputum production. Denies fever, chills, HA, dizziness, CP, palpitations, abd pain, n/v/d, constipation, melena/hematochezia, dysuria/hematuria, numbness/weakness/tingling, or other complaint. Reports that he is employed as , seated for prolonged periods. Denies personal or FHx of VTE. Denies sick contact or recent travel. At rest in ED he states that he feels generally well, eager to be discharged. Of note, per HIE pt evaluated by christian 5/20 for [place], L CP w/ exertion, eventually referred to ED.    Pt does not recall medication list.    HR 80s-110s, BP 100s-150s/50s-70s  band neutrophil % 10.6, H/H 7.5/23.8 (normocytic), ; PT/INR prolonged; DDimer 1010; HST 67 -> 54 -> 52,  -> 191, CKMB units 1.6 -> 2.3, CPK Mass Assay% 0.7 -> 1.2, proBNP 520; Ca 7.8 corrected; VBG 7.31/38/23/19 lact 2.1   UA proteinuria  CXR clear lungs (prelim)  POCUS ED TTE no pericardial effusion  CTA chest PE protocol [facility] Acute bilateral pulmonary emboli within the subsegmental arteries of all lobes. No evidence of right heart strain or pulmonary infarct.  s/p initiation of hep gtt, ASA 324mg PO    Evaluated by cardiology, ACS low likelihood, recommend trend Benoit, EKGs, obtain TTE, can get exercise nuclear stress for additional ischemic evaluation, c/s heme for worsened anemia    admit to medicine for further eval/mgt

## 2024-05-23 NOTE — H&P ADULT - PROBLEM SELECTOR PLAN 8
- rpt Ca in AM   - hep gtt re VTE ppx  - fall/aspiration precaution  - CC diet pending RN dysphagia screen   - dispo pending course/PT eval   - rpt UA as outpt given proteinuria - rpt Ca in AM   - hep gtt re VTE ppx  - fall/aspiration precaution  - CC diet pending RN dysphagia screen   - dispo pending course   - rpt UA as outpt given proteinuria

## 2024-05-24 ENCOUNTER — NON-APPOINTMENT (OUTPATIENT)
Age: 74
End: 2024-05-24

## 2024-05-28 LAB
CULTURE RESULTS: SIGNIFICANT CHANGE UP
SPECIMEN SOURCE: SIGNIFICANT CHANGE UP

## 2024-05-29 ENCOUNTER — RESULT REVIEW (OUTPATIENT)
Age: 74
End: 2024-05-29

## 2024-05-29 ENCOUNTER — APPOINTMENT (OUTPATIENT)
Dept: HEMATOLOGY ONCOLOGY | Facility: CLINIC | Age: 74
End: 2024-05-29

## 2024-05-29 LAB
ANISOCYTOSIS BLD QL: SIGNIFICANT CHANGE UP
BASOPHILS # BLD AUTO: 0 K/UL — SIGNIFICANT CHANGE UP (ref 0–0.2)
BASOPHILS NFR BLD AUTO: 0 % — SIGNIFICANT CHANGE UP (ref 0–2)
BLASTS # FLD: 2 % — HIGH (ref 0–0)
DACRYOCYTES BLD QL SMEAR: SLIGHT — SIGNIFICANT CHANGE UP
ELLIPTOCYTES BLD QL SMEAR: SLIGHT — SIGNIFICANT CHANGE UP
EOSINOPHIL # BLD AUTO: 0.05 K/UL — SIGNIFICANT CHANGE UP (ref 0–0.5)
EOSINOPHIL NFR BLD AUTO: 1 % — SIGNIFICANT CHANGE UP (ref 0–6)
GIANT PLATELETS BLD QL SMEAR: PRESENT — SIGNIFICANT CHANGE UP
HCT VFR BLD CALC: 23.8 % — LOW (ref 39–50)
HGB BLD-MCNC: 7.6 G/DL — LOW (ref 13–17)
HYPOCHROMIA BLD QL: SLIGHT — SIGNIFICANT CHANGE UP
HYPOGRANULAR PLT: PRESENT
LG PLATELETS BLD QL AUTO: SIGNIFICANT CHANGE UP
LYMPHOCYTES # BLD AUTO: 1.52 K/UL — SIGNIFICANT CHANGE UP (ref 1–3.3)
LYMPHOCYTES # BLD AUTO: 31 % — SIGNIFICANT CHANGE UP (ref 13–44)
MACROCYTES BLD QL: SLIGHT — SIGNIFICANT CHANGE UP
MCHC RBC-ENTMCNC: 26.8 PG — LOW (ref 27–34)
MCHC RBC-ENTMCNC: 31.9 G/DL — LOW (ref 32–36)
MCV RBC AUTO: 83.8 FL — SIGNIFICANT CHANGE UP (ref 80–100)
METAMYELOCYTES # FLD: 1 % — HIGH (ref 0–0)
MONOCYTES # BLD AUTO: 0.34 K/UL — SIGNIFICANT CHANGE UP (ref 0–0.9)
MONOCYTES NFR BLD AUTO: 7 % — SIGNIFICANT CHANGE UP (ref 2–14)
MYELOCYTES NFR BLD: 6 % — HIGH (ref 0–0)
NEUTROPHILS # BLD AUTO: 2.55 K/UL — SIGNIFICANT CHANGE UP (ref 1.8–7.4)
NEUTROPHILS NFR BLD AUTO: 52 % — SIGNIFICANT CHANGE UP (ref 43–77)
NRBC # BLD: 105 /100 WBCS — HIGH (ref 0–0)
NRBC # BLD: SIGNIFICANT CHANGE UP /100 WBCS (ref 0–0)
PAPPENHEIMER BOD BLD QL SMEAR: PRESENT — SIGNIFICANT CHANGE UP
PLAT MORPH BLD: ABNORMAL
PLATELET # BLD AUTO: 284 K/UL — SIGNIFICANT CHANGE UP (ref 150–400)
POIKILOCYTOSIS BLD QL AUTO: SIGNIFICANT CHANGE UP
POLYCHROMASIA BLD QL SMEAR: SLIGHT — SIGNIFICANT CHANGE UP
RBC # BLD: 2.84 M/UL — LOW (ref 4.2–5.8)
RBC # FLD: 31.5 % — HIGH (ref 10.3–14.5)
RBC BLD AUTO: ABNORMAL
RETICS #: 116.3 K/UL — SIGNIFICANT CHANGE UP (ref 25–125)
RETICS/RBC NFR: 4.1 % — HIGH (ref 0.5–2.5)
SCHISTOCYTES BLD QL AUTO: SLIGHT — SIGNIFICANT CHANGE UP
TARGETS BLD QL SMEAR: SLIGHT — SIGNIFICANT CHANGE UP
WBC # BLD: 4.9 K/UL — SIGNIFICANT CHANGE UP (ref 3.8–10.5)
WBC # FLD AUTO: 4.9 K/UL — SIGNIFICANT CHANGE UP (ref 3.8–10.5)

## 2024-05-30 LAB — EPO SERPL-MCNC: 242.6 MIU/ML

## 2024-06-03 ENCOUNTER — RESULT REVIEW (OUTPATIENT)
Age: 74
End: 2024-06-03

## 2024-06-03 ENCOUNTER — APPOINTMENT (OUTPATIENT)
Dept: HEMATOLOGY ONCOLOGY | Facility: CLINIC | Age: 74
End: 2024-06-03

## 2024-06-03 LAB
ANISOCYTOSIS BLD QL: SIGNIFICANT CHANGE UP
BASOPHILS # BLD AUTO: 0 K/UL — SIGNIFICANT CHANGE UP (ref 0–0.2)
BASOPHILS NFR BLD AUTO: 0 % — SIGNIFICANT CHANGE UP (ref 0–2)
DACRYOCYTES BLD QL SMEAR: SLIGHT — SIGNIFICANT CHANGE UP
ELLIPTOCYTES BLD QL SMEAR: SLIGHT — SIGNIFICANT CHANGE UP
EOSINOPHIL # BLD AUTO: 0.11 K/UL — SIGNIFICANT CHANGE UP (ref 0–0.5)
EOSINOPHIL NFR BLD AUTO: 2 % — SIGNIFICANT CHANGE UP (ref 0–6)
GIANT PLATELETS BLD QL SMEAR: PRESENT — SIGNIFICANT CHANGE UP
HCT VFR BLD CALC: 23.5 % — LOW (ref 39–50)
HGB BLD-MCNC: 7.6 G/DL — LOW (ref 13–17)
HYPOCHROMIA BLD QL: SLIGHT — SIGNIFICANT CHANGE UP
LG PLATELETS BLD QL AUTO: SIGNIFICANT CHANGE UP
LYMPHOCYTES # BLD AUTO: 1.75 K/UL — SIGNIFICANT CHANGE UP (ref 1–3.3)
LYMPHOCYTES # BLD AUTO: 31 % — SIGNIFICANT CHANGE UP (ref 13–44)
MCHC RBC-ENTMCNC: 27.3 PG — SIGNIFICANT CHANGE UP (ref 27–34)
MCHC RBC-ENTMCNC: 32.3 G/DL — SIGNIFICANT CHANGE UP (ref 32–36)
MCV RBC AUTO: 84.5 FL — SIGNIFICANT CHANGE UP (ref 80–100)
MONOCYTES # BLD AUTO: 0.79 K/UL — SIGNIFICANT CHANGE UP (ref 0–0.9)
MONOCYTES NFR BLD AUTO: 14 % — SIGNIFICANT CHANGE UP (ref 2–14)
MYELOCYTES NFR BLD: 1 % — HIGH (ref 0–0)
NEUTROPHILS # BLD AUTO: 2.94 K/UL — SIGNIFICANT CHANGE UP (ref 1.8–7.4)
NEUTROPHILS NFR BLD AUTO: 52 % — SIGNIFICANT CHANGE UP (ref 43–77)
NRBC # BLD: 75 /100 WBCS — HIGH (ref 0–0)
NRBC # BLD: SIGNIFICANT CHANGE UP /100 WBCS (ref 0–0)
PAPPENHEIMER BOD BLD QL SMEAR: PRESENT — SIGNIFICANT CHANGE UP
PLAT MORPH BLD: ABNORMAL
PLATELET # BLD AUTO: 586 K/UL — HIGH (ref 150–400)
POIKILOCYTOSIS BLD QL AUTO: SIGNIFICANT CHANGE UP
POLYCHROMASIA BLD QL SMEAR: SLIGHT — SIGNIFICANT CHANGE UP
RBC # BLD: 2.78 M/UL — LOW (ref 4.2–5.8)
RBC # FLD: 32.6 % — HIGH (ref 10.3–14.5)
RBC BLD AUTO: ABNORMAL
SCHISTOCYTES BLD QL AUTO: SLIGHT — SIGNIFICANT CHANGE UP
SMUDGE CELLS # BLD: PRESENT — SIGNIFICANT CHANGE UP
TARGETS BLD QL SMEAR: SLIGHT — SIGNIFICANT CHANGE UP
WBC # BLD: 5.66 K/UL — SIGNIFICANT CHANGE UP (ref 3.8–10.5)
WBC # FLD AUTO: 5.66 K/UL — SIGNIFICANT CHANGE UP (ref 3.8–10.5)

## 2024-06-04 DIAGNOSIS — D64.9 ANEMIA, UNSPECIFIED: ICD-10-CM

## 2024-06-04 RX ORDER — MOMELOTINIB 200 MG/1
200 TABLET ORAL
Qty: 30 | Refills: 0 | Status: ACTIVE | COMMUNITY
Start: 2024-06-04 | End: 1900-01-01

## 2024-06-04 RX ORDER — RUXOLITINIB 15 MG/1
15 TABLET ORAL TWICE DAILY
Qty: 60 | Refills: 3 | Status: COMPLETED | COMMUNITY
Start: 2024-05-14 | End: 2024-06-04

## 2024-06-10 ENCOUNTER — RESULT REVIEW (OUTPATIENT)
Age: 74
End: 2024-06-10

## 2024-06-10 ENCOUNTER — APPOINTMENT (OUTPATIENT)
Dept: INFUSION THERAPY | Facility: HOSPITAL | Age: 74
End: 2024-06-10

## 2024-06-10 ENCOUNTER — APPOINTMENT (OUTPATIENT)
Dept: HEMATOLOGY ONCOLOGY | Facility: CLINIC | Age: 74
End: 2024-06-10

## 2024-06-10 LAB
ANISOCYTOSIS BLD QL: SIGNIFICANT CHANGE UP
BASOPHILS # BLD AUTO: 0 K/UL — SIGNIFICANT CHANGE UP (ref 0–0.2)
BASOPHILS NFR BLD AUTO: 0 % — SIGNIFICANT CHANGE UP (ref 0–2)
DACRYOCYTES BLD QL SMEAR: SIGNIFICANT CHANGE UP
ELLIPTOCYTES BLD QL SMEAR: SLIGHT — SIGNIFICANT CHANGE UP
EOSINOPHIL # BLD AUTO: 0 K/UL — SIGNIFICANT CHANGE UP (ref 0–0.5)
EOSINOPHIL NFR BLD AUTO: 0 % — SIGNIFICANT CHANGE UP (ref 0–6)
GIANT PLATELETS BLD QL SMEAR: PRESENT — SIGNIFICANT CHANGE UP
HCT VFR BLD CALC: 27.2 % — LOW (ref 39–50)
HGB BLD-MCNC: 8.5 G/DL — LOW (ref 13–17)
HYPOCHROMIA BLD QL: SLIGHT — SIGNIFICANT CHANGE UP
LG PLATELETS BLD QL AUTO: SIGNIFICANT CHANGE UP
LYMPHOCYTES # BLD AUTO: 2.12 K/UL — SIGNIFICANT CHANGE UP (ref 1–3.3)
LYMPHOCYTES # BLD AUTO: 36 % — SIGNIFICANT CHANGE UP (ref 13–44)
MCHC RBC-ENTMCNC: 26.8 PG — LOW (ref 27–34)
MCHC RBC-ENTMCNC: 31.3 G/DL — LOW (ref 32–36)
MCV RBC AUTO: 85.8 FL — SIGNIFICANT CHANGE UP (ref 80–100)
MICROCYTES BLD QL: SLIGHT — SIGNIFICANT CHANGE UP
MONOCYTES # BLD AUTO: 0.24 K/UL — SIGNIFICANT CHANGE UP (ref 0–0.9)
MONOCYTES NFR BLD AUTO: 4 % — SIGNIFICANT CHANGE UP (ref 2–14)
MYELOCYTES NFR BLD: 1 % — HIGH (ref 0–0)
NEUTROPHILS # BLD AUTO: 3.48 K/UL — SIGNIFICANT CHANGE UP (ref 1.8–7.4)
NEUTROPHILS NFR BLD AUTO: 58 % — SIGNIFICANT CHANGE UP (ref 43–77)
NEUTS BAND # BLD: 1 % — SIGNIFICANT CHANGE UP (ref 0–8)
NRBC # BLD: 76 /100 WBCS — HIGH (ref 0–0)
NRBC # BLD: SIGNIFICANT CHANGE UP /100 WBCS (ref 0–0)
PAPPENHEIMER BOD BLD QL SMEAR: PRESENT — SIGNIFICANT CHANGE UP
PLAT MORPH BLD: ABNORMAL
PLATELET # BLD AUTO: 594 K/UL — HIGH (ref 150–400)
POIKILOCYTOSIS BLD QL AUTO: SIGNIFICANT CHANGE UP
POLYCHROMASIA BLD QL SMEAR: SLIGHT — SIGNIFICANT CHANGE UP
RBC # BLD: 3.17 M/UL — LOW (ref 4.2–5.8)
RBC # FLD: 33.6 % — HIGH (ref 10.3–14.5)
RBC BLD AUTO: ABNORMAL
SCHISTOCYTES BLD QL AUTO: SLIGHT — SIGNIFICANT CHANGE UP
TARGETS BLD QL SMEAR: SIGNIFICANT CHANGE UP
WBC # BLD: 5.9 K/UL — SIGNIFICANT CHANGE UP (ref 3.8–10.5)
WBC # FLD AUTO: 5.9 K/UL — SIGNIFICANT CHANGE UP (ref 3.8–10.5)

## 2024-06-17 ENCOUNTER — RESULT REVIEW (OUTPATIENT)
Age: 74
End: 2024-06-17

## 2024-06-17 ENCOUNTER — APPOINTMENT (OUTPATIENT)
Dept: HEMATOLOGY ONCOLOGY | Facility: CLINIC | Age: 74
End: 2024-06-17

## 2024-06-17 LAB
ANISOCYTOSIS BLD QL: SIGNIFICANT CHANGE UP
BASOPHILS # BLD AUTO: 0 K/UL — SIGNIFICANT CHANGE UP (ref 0–0.2)
BASOPHILS NFR BLD AUTO: 0 % — SIGNIFICANT CHANGE UP (ref 0–2)
DACRYOCYTES BLD QL SMEAR: SIGNIFICANT CHANGE UP
ELLIPTOCYTES BLD QL SMEAR: SLIGHT — SIGNIFICANT CHANGE UP
EOSINOPHIL # BLD AUTO: 0.22 K/UL — SIGNIFICANT CHANGE UP (ref 0–0.5)
EOSINOPHIL NFR BLD AUTO: 4 % — SIGNIFICANT CHANGE UP (ref 0–6)
GIANT PLATELETS BLD QL SMEAR: PRESENT — SIGNIFICANT CHANGE UP
HCT VFR BLD CALC: 28.6 % — LOW (ref 39–50)
HGB BLD-MCNC: 9 G/DL — LOW (ref 13–17)
HYPOCHROMIA BLD QL: SLIGHT — SIGNIFICANT CHANGE UP
LG PLATELETS BLD QL AUTO: SIGNIFICANT CHANGE UP
LYMPHOCYTES # BLD AUTO: 1.62 K/UL — SIGNIFICANT CHANGE UP (ref 1–3.3)
LYMPHOCYTES # BLD AUTO: 30 % — SIGNIFICANT CHANGE UP (ref 13–44)
MCHC RBC-ENTMCNC: 27.4 PG — SIGNIFICANT CHANGE UP (ref 27–34)
MCHC RBC-ENTMCNC: 31.5 G/DL — LOW (ref 32–36)
MCV RBC AUTO: 86.9 FL — SIGNIFICANT CHANGE UP (ref 80–100)
METAMYELOCYTES # FLD: 1 % — HIGH (ref 0–0)
MICROCYTES BLD QL: SLIGHT — SIGNIFICANT CHANGE UP
MONOCYTES # BLD AUTO: 0.05 K/UL — SIGNIFICANT CHANGE UP (ref 0–0.9)
MONOCYTES NFR BLD AUTO: 1 % — LOW (ref 2–14)
MYELOCYTES NFR BLD: 1 % — HIGH (ref 0–0)
NEUTROPHILS # BLD AUTO: 3.4 K/UL — SIGNIFICANT CHANGE UP (ref 1.8–7.4)
NEUTROPHILS NFR BLD AUTO: 63 % — SIGNIFICANT CHANGE UP (ref 43–77)
NRBC # BLD: 70 /100 WBCS — HIGH (ref 0–0)
NRBC # BLD: SIGNIFICANT CHANGE UP /100 WBCS (ref 0–0)
PAPPENHEIMER BOD BLD QL SMEAR: PRESENT — SIGNIFICANT CHANGE UP
PLAT MORPH BLD: ABNORMAL
PLATELET # BLD AUTO: 372 K/UL — SIGNIFICANT CHANGE UP (ref 150–400)
POIKILOCYTOSIS BLD QL AUTO: SIGNIFICANT CHANGE UP
POLYCHROMASIA BLD QL SMEAR: SLIGHT — SIGNIFICANT CHANGE UP
RBC # BLD: 3.29 M/UL — LOW (ref 4.2–5.8)
RBC # FLD: 34.1 % — HIGH (ref 10.3–14.5)
RBC BLD AUTO: ABNORMAL
SCHISTOCYTES BLD QL AUTO: SLIGHT — SIGNIFICANT CHANGE UP
TARGETS BLD QL SMEAR: SIGNIFICANT CHANGE UP
WBC # BLD: 5.39 K/UL — SIGNIFICANT CHANGE UP (ref 3.8–10.5)
WBC # FLD AUTO: 5.39 K/UL — SIGNIFICANT CHANGE UP (ref 3.8–10.5)

## 2024-06-19 LAB
ALBUMIN SERPL ELPH-MCNC: 3.8 G/DL
ALP BLD-CCNC: 82 U/L
ALT SERPL-CCNC: 15 U/L
ANION GAP SERPL CALC-SCNC: 13 MMOL/L
AST SERPL-CCNC: 22 U/L
BILIRUB SERPL-MCNC: 0.7 MG/DL
BUN SERPL-MCNC: 10 MG/DL
CALCIUM SERPL-MCNC: 8.7 MG/DL
CHLORIDE SERPL-SCNC: 109 MMOL/L
CO2 SERPL-SCNC: 22 MMOL/L
CREAT SERPL-MCNC: 0.98 MG/DL
EGFR: 81 ML/MIN/1.73M2
GLUCOSE SERPL-MCNC: 180 MG/DL
POTASSIUM SERPL-SCNC: 4.5 MMOL/L
PROT SERPL-MCNC: 6.1 G/DL
SODIUM SERPL-SCNC: 143 MMOL/L

## 2024-06-24 ENCOUNTER — APPOINTMENT (OUTPATIENT)
Dept: HEMATOLOGY ONCOLOGY | Facility: CLINIC | Age: 74
End: 2024-06-24

## 2024-06-24 ENCOUNTER — RESULT REVIEW (OUTPATIENT)
Age: 74
End: 2024-06-24

## 2024-06-24 LAB
ANISOCYTOSIS BLD QL: SIGNIFICANT CHANGE UP
BASOPHILS # BLD AUTO: 0 K/UL — SIGNIFICANT CHANGE UP (ref 0–0.2)
BASOPHILS NFR BLD AUTO: 0 % — SIGNIFICANT CHANGE UP (ref 0–2)
BLASTS # FLD: 2 % — HIGH (ref 0–0)
DACRYOCYTES BLD QL SMEAR: SLIGHT — SIGNIFICANT CHANGE UP
ELLIPTOCYTES BLD QL SMEAR: SLIGHT — SIGNIFICANT CHANGE UP
EOSINOPHIL # BLD AUTO: 0.15 K/UL — SIGNIFICANT CHANGE UP (ref 0–0.5)
EOSINOPHIL NFR BLD AUTO: 2 % — SIGNIFICANT CHANGE UP (ref 0–6)
GIANT PLATELETS BLD QL SMEAR: PRESENT — SIGNIFICANT CHANGE UP
HCT VFR BLD CALC: 28.6 % — LOW (ref 39–50)
HGB BLD-MCNC: 8.9 G/DL — LOW (ref 13–17)
HOWELL-JOLLY BOD BLD QL SMEAR: PRESENT — SIGNIFICANT CHANGE UP
HYPOCHROMIA BLD QL: SIGNIFICANT CHANGE UP
LG PLATELETS BLD QL AUTO: SIGNIFICANT CHANGE UP
LYMPHOCYTES # BLD AUTO: 2.6 K/UL — SIGNIFICANT CHANGE UP (ref 1–3.3)
LYMPHOCYTES # BLD AUTO: 35 % — SIGNIFICANT CHANGE UP (ref 13–44)
MCHC RBC-ENTMCNC: 26.8 PG — LOW (ref 27–34)
MCHC RBC-ENTMCNC: 31.1 G/DL — LOW (ref 32–36)
MCV RBC AUTO: 86.1 FL — SIGNIFICANT CHANGE UP (ref 80–100)
MONOCYTES # BLD AUTO: 0.15 K/UL — SIGNIFICANT CHANGE UP (ref 0–0.9)
MONOCYTES NFR BLD AUTO: 2 % — SIGNIFICANT CHANGE UP (ref 2–14)
NEUTROPHILS # BLD AUTO: 4.38 K/UL — SIGNIFICANT CHANGE UP (ref 1.8–7.4)
NEUTROPHILS NFR BLD AUTO: 59 % — SIGNIFICANT CHANGE UP (ref 43–77)
NRBC # BLD: 41 /100 WBCS — HIGH (ref 0–0)
NRBC # BLD: SIGNIFICANT CHANGE UP /100 WBCS (ref 0–0)
PAPPENHEIMER BOD BLD QL SMEAR: PRESENT — SIGNIFICANT CHANGE UP
PLAT MORPH BLD: ABNORMAL
PLATELET # BLD AUTO: 1137 K/UL — CRITICAL HIGH (ref 150–400)
POIKILOCYTOSIS BLD QL AUTO: SIGNIFICANT CHANGE UP
POLYCHROMASIA BLD QL SMEAR: SLIGHT — SIGNIFICANT CHANGE UP
RBC # BLD: 3.32 M/UL — LOW (ref 4.2–5.8)
RBC # FLD: 34.1 % — HIGH (ref 10.3–14.5)
RBC BLD AUTO: ABNORMAL
SCHISTOCYTES BLD QL AUTO: SLIGHT — SIGNIFICANT CHANGE UP
TARGETS BLD QL SMEAR: SLIGHT — SIGNIFICANT CHANGE UP
WBC # BLD: 7.43 K/UL — SIGNIFICANT CHANGE UP (ref 3.8–10.5)
WBC # FLD AUTO: 7.43 K/UL — SIGNIFICANT CHANGE UP (ref 3.8–10.5)

## 2024-06-28 PROBLEM — H40.9 UNSPECIFIED GLAUCOMA: Chronic | Status: ACTIVE | Noted: 2024-05-23

## 2024-06-28 PROBLEM — I87.2 VENOUS INSUFFICIENCY (CHRONIC) (PERIPHERAL): Chronic | Status: ACTIVE | Noted: 2024-05-23

## 2024-06-28 PROBLEM — R20.2 PARESTHESIA OF SKIN: Chronic | Status: ACTIVE | Noted: 2024-05-23

## 2024-06-28 PROBLEM — D46.9 MYELODYSPLASTIC SYNDROME, UNSPECIFIED: Chronic | Status: ACTIVE | Noted: 2024-05-23

## 2024-07-01 ENCOUNTER — NON-APPOINTMENT (OUTPATIENT)
Age: 74
End: 2024-07-01

## 2024-07-01 ENCOUNTER — RESULT REVIEW (OUTPATIENT)
Age: 74
End: 2024-07-01

## 2024-07-01 ENCOUNTER — APPOINTMENT (OUTPATIENT)
Dept: HEMATOLOGY ONCOLOGY | Facility: CLINIC | Age: 74
End: 2024-07-01

## 2024-07-01 ENCOUNTER — APPOINTMENT (OUTPATIENT)
Dept: INFUSION THERAPY | Facility: HOSPITAL | Age: 74
End: 2024-07-01

## 2024-07-01 LAB
ANISOCYTOSIS BLD QL: SIGNIFICANT CHANGE UP
BASOPHILS # BLD AUTO: 0.11 K/UL — SIGNIFICANT CHANGE UP (ref 0–0.2)
BASOPHILS NFR BLD AUTO: 1 % — SIGNIFICANT CHANGE UP (ref 0–2)
BLASTS # FLD: 2 % — HIGH (ref 0–0)
DACRYOCYTES BLD QL SMEAR: SLIGHT — SIGNIFICANT CHANGE UP
ELLIPTOCYTES BLD QL SMEAR: SLIGHT — SIGNIFICANT CHANGE UP
EOSINOPHIL # BLD AUTO: 0.32 K/UL — SIGNIFICANT CHANGE UP (ref 0–0.5)
EOSINOPHIL NFR BLD AUTO: 3 % — SIGNIFICANT CHANGE UP (ref 0–6)
GIANT PLATELETS BLD QL SMEAR: PRESENT — SIGNIFICANT CHANGE UP
HCT VFR BLD CALC: 31.3 % — LOW (ref 39–50)
HGB BLD-MCNC: 9.7 G/DL — LOW (ref 13–17)
HOWELL-JOLLY BOD BLD QL SMEAR: PRESENT — SIGNIFICANT CHANGE UP
HYPOCHROMIA BLD QL: SIGNIFICANT CHANGE UP
LG PLATELETS BLD QL AUTO: SIGNIFICANT CHANGE UP
LYMPHOCYTES # BLD AUTO: 3.36 K/UL — HIGH (ref 1–3.3)
LYMPHOCYTES # BLD AUTO: 32 % — SIGNIFICANT CHANGE UP (ref 13–44)
MCHC RBC-ENTMCNC: 27.2 PG — SIGNIFICANT CHANGE UP (ref 27–34)
MCHC RBC-ENTMCNC: 31 G/DL — LOW (ref 32–36)
MCV RBC AUTO: 87.7 FL — SIGNIFICANT CHANGE UP (ref 80–100)
METAMYELOCYTES # FLD: 1 % — HIGH (ref 0–0)
MONOCYTES # BLD AUTO: 0.11 K/UL — SIGNIFICANT CHANGE UP (ref 0–0.9)
MONOCYTES NFR BLD AUTO: 1 % — LOW (ref 2–14)
MYELOCYTES NFR BLD: 6 % — HIGH (ref 0–0)
NEUTROPHILS # BLD AUTO: 5.67 K/UL — SIGNIFICANT CHANGE UP (ref 1.8–7.4)
NEUTROPHILS NFR BLD AUTO: 54 % — SIGNIFICANT CHANGE UP (ref 43–77)
NRBC # BLD: 45 /100 WBCS — HIGH (ref 0–0)
NRBC # BLD: SIGNIFICANT CHANGE UP /100 WBCS (ref 0–0)
PAPPENHEIMER BOD BLD QL SMEAR: PRESENT — SIGNIFICANT CHANGE UP
PLAT MORPH BLD: ABNORMAL
PLATELET # BLD AUTO: 1175 K/UL — CRITICAL HIGH (ref 150–400)
POIKILOCYTOSIS BLD QL AUTO: SIGNIFICANT CHANGE UP
POLYCHROMASIA BLD QL SMEAR: SLIGHT — SIGNIFICANT CHANGE UP
RBC # BLD: 3.57 M/UL — LOW (ref 4.2–5.8)
RBC # FLD: 33.4 % — HIGH (ref 10.3–14.5)
RBC BLD AUTO: ABNORMAL
SCHISTOCYTES BLD QL AUTO: SLIGHT — SIGNIFICANT CHANGE UP
TARGETS BLD QL SMEAR: SLIGHT — SIGNIFICANT CHANGE UP
WBC # BLD: 10.5 K/UL — SIGNIFICANT CHANGE UP (ref 3.8–10.5)
WBC # FLD AUTO: 10.5 K/UL — SIGNIFICANT CHANGE UP (ref 3.8–10.5)

## 2024-07-02 DIAGNOSIS — C92.10 CHRONIC MYELOID LEUKEMIA, BCR/ABL-POSITIVE, NOT HAVING ACHIEVED REMISSION: ICD-10-CM

## 2024-07-08 ENCOUNTER — RESULT REVIEW (OUTPATIENT)
Age: 74
End: 2024-07-08

## 2024-07-08 ENCOUNTER — APPOINTMENT (OUTPATIENT)
Dept: HEMATOLOGY ONCOLOGY | Facility: CLINIC | Age: 74
End: 2024-07-08

## 2024-07-08 LAB
ANISOCYTOSIS BLD QL: SIGNIFICANT CHANGE UP
BASOPHILS # BLD AUTO: 0.22 K/UL — HIGH (ref 0–0.2)
BASOPHILS NFR BLD AUTO: 2 % — SIGNIFICANT CHANGE UP (ref 0–2)
BLASTS # FLD: 2 % — HIGH (ref 0–0)
DACRYOCYTES BLD QL SMEAR: SLIGHT — SIGNIFICANT CHANGE UP
ELLIPTOCYTES BLD QL SMEAR: SLIGHT — SIGNIFICANT CHANGE UP
EOSINOPHIL # BLD AUTO: 0.22 K/UL — SIGNIFICANT CHANGE UP (ref 0–0.5)
EOSINOPHIL NFR BLD AUTO: 2 % — SIGNIFICANT CHANGE UP (ref 0–6)
GIANT PLATELETS BLD QL SMEAR: PRESENT — SIGNIFICANT CHANGE UP
HCT VFR BLD CALC: 33.2 % — LOW (ref 39–50)
HGB BLD-MCNC: 10.4 G/DL — LOW (ref 13–17)
HOWELL-JOLLY BOD BLD QL SMEAR: PRESENT — SIGNIFICANT CHANGE UP
LG PLATELETS BLD QL AUTO: SIGNIFICANT CHANGE UP
LYMPHOCYTES # BLD AUTO: 2.56 K/UL — SIGNIFICANT CHANGE UP (ref 1–3.3)
LYMPHOCYTES # BLD AUTO: 23 % — SIGNIFICANT CHANGE UP (ref 13–44)
MCHC RBC-ENTMCNC: 26.9 PG — LOW (ref 27–34)
MCHC RBC-ENTMCNC: 31.3 G/DL — LOW (ref 32–36)
MCV RBC AUTO: 86 FL — SIGNIFICANT CHANGE UP (ref 80–100)
METAMYELOCYTES # FLD: 2 % — HIGH (ref 0–0)
MONOCYTES # BLD AUTO: 0.67 K/UL — SIGNIFICANT CHANGE UP (ref 0–0.9)
MONOCYTES NFR BLD AUTO: 6 % — SIGNIFICANT CHANGE UP (ref 2–14)
MYELOCYTES NFR BLD: 5 % — HIGH (ref 0–0)
NEUTROPHILS # BLD AUTO: 6.46 K/UL — SIGNIFICANT CHANGE UP (ref 1.8–7.4)
NEUTROPHILS NFR BLD AUTO: 58 % — SIGNIFICANT CHANGE UP (ref 43–77)
NRBC # BLD: 44 /100 WBCS — HIGH (ref 0–0)
NRBC # BLD: SIGNIFICANT CHANGE UP /100 WBCS (ref 0–0)
PAPPENHEIMER BOD BLD QL SMEAR: PRESENT — SIGNIFICANT CHANGE UP
PLAT MORPH BLD: ABNORMAL
PLATELET # BLD AUTO: 1019 K/UL — CRITICAL HIGH (ref 150–400)
POIKILOCYTOSIS BLD QL AUTO: SIGNIFICANT CHANGE UP
POLYCHROMASIA BLD QL SMEAR: SLIGHT — SIGNIFICANT CHANGE UP
RBC # BLD: 3.86 M/UL — LOW (ref 4.2–5.8)
RBC # FLD: 32.9 % — HIGH (ref 10.3–14.5)
RBC BLD AUTO: ABNORMAL
SCHISTOCYTES BLD QL AUTO: SLIGHT — SIGNIFICANT CHANGE UP
TARGETS BLD QL SMEAR: SLIGHT — SIGNIFICANT CHANGE UP
WBC # BLD: 11.14 K/UL — HIGH (ref 3.8–10.5)
WBC # FLD AUTO: 11.14 K/UL — HIGH (ref 3.8–10.5)

## 2024-07-12 PROBLEM — Z86.19 PERSONAL HISTORY OF OTHER INFECTIOUS AND PARASITIC DISEASES: Chronic | Status: ACTIVE | Noted: 2024-05-23

## 2024-07-15 ENCOUNTER — RESULT REVIEW (OUTPATIENT)
Age: 74
End: 2024-07-15

## 2024-07-15 ENCOUNTER — APPOINTMENT (OUTPATIENT)
Dept: HEMATOLOGY ONCOLOGY | Facility: CLINIC | Age: 74
End: 2024-07-15

## 2024-07-15 LAB
ANISOCYTOSIS BLD QL: SIGNIFICANT CHANGE UP
BASOPHILS # BLD AUTO: 0.09 K/UL — SIGNIFICANT CHANGE UP (ref 0–0.2)
BASOPHILS NFR BLD AUTO: 1 % — SIGNIFICANT CHANGE UP (ref 0–2)
BLASTS # FLD: 2 % — HIGH (ref 0–0)
DACRYOCYTES BLD QL SMEAR: SLIGHT — SIGNIFICANT CHANGE UP
ELLIPTOCYTES BLD QL SMEAR: SLIGHT — SIGNIFICANT CHANGE UP
EOSINOPHIL # BLD AUTO: 0.26 K/UL — SIGNIFICANT CHANGE UP (ref 0–0.5)
EOSINOPHIL NFR BLD AUTO: 3 % — SIGNIFICANT CHANGE UP (ref 0–6)
GIANT PLATELETS BLD QL SMEAR: PRESENT — SIGNIFICANT CHANGE UP
HCT VFR BLD CALC: 35.7 % — LOW (ref 39–50)
HGB BLD-MCNC: 11.4 G/DL — LOW (ref 13–17)
HOWELL-JOLLY BOD BLD QL SMEAR: PRESENT — SIGNIFICANT CHANGE UP
LG PLATELETS BLD QL AUTO: SIGNIFICANT CHANGE UP
LYMPHOCYTES # BLD AUTO: 2.11 K/UL — SIGNIFICANT CHANGE UP (ref 1–3.3)
LYMPHOCYTES # BLD AUTO: 24 % — SIGNIFICANT CHANGE UP (ref 13–44)
MCHC RBC-ENTMCNC: 26.8 PG — LOW (ref 27–34)
MCHC RBC-ENTMCNC: 31.9 G/DL — LOW (ref 32–36)
MCV RBC AUTO: 84 FL — SIGNIFICANT CHANGE UP (ref 80–100)
METAMYELOCYTES # FLD: 1 % — HIGH (ref 0–0)
MONOCYTES # BLD AUTO: 0.62 K/UL — SIGNIFICANT CHANGE UP (ref 0–0.9)
MONOCYTES NFR BLD AUTO: 7 % — SIGNIFICANT CHANGE UP (ref 2–14)
MYELOCYTES NFR BLD: 5 % — HIGH (ref 0–0)
NEUTROPHILS # BLD AUTO: 5.02 K/UL — SIGNIFICANT CHANGE UP (ref 1.8–7.4)
NEUTROPHILS NFR BLD AUTO: 57 % — SIGNIFICANT CHANGE UP (ref 43–77)
NRBC # BLD: 40 /100 WBCS — HIGH (ref 0–0)
NRBC # BLD: SIGNIFICANT CHANGE UP /100 WBCS (ref 0–0)
PAPPENHEIMER BOD BLD QL SMEAR: PRESENT — SIGNIFICANT CHANGE UP
PLAT MORPH BLD: ABNORMAL
PLATELET # BLD AUTO: 967 K/UL — HIGH (ref 150–400)
POIKILOCYTOSIS BLD QL AUTO: SIGNIFICANT CHANGE UP
POLYCHROMASIA BLD QL SMEAR: SLIGHT — SIGNIFICANT CHANGE UP
RBC # BLD: 4.25 M/UL — SIGNIFICANT CHANGE UP (ref 4.2–5.8)
RBC # FLD: 32.1 % — HIGH (ref 10.3–14.5)
RBC BLD AUTO: ABNORMAL
SCHISTOCYTES BLD QL AUTO: SLIGHT — SIGNIFICANT CHANGE UP
TARGETS BLD QL SMEAR: SLIGHT — SIGNIFICANT CHANGE UP
WBC # BLD: 8.81 K/UL — SIGNIFICANT CHANGE UP (ref 3.8–10.5)
WBC # FLD AUTO: 8.81 K/UL — SIGNIFICANT CHANGE UP (ref 3.8–10.5)

## 2024-07-16 ENCOUNTER — OUTPATIENT (OUTPATIENT)
Dept: OUTPATIENT SERVICES | Facility: HOSPITAL | Age: 74
LOS: 1 days | Discharge: HOME | End: 2024-07-16

## 2024-07-16 DIAGNOSIS — D69.6 THROMBOCYTOPENIA, UNSPECIFIED: ICD-10-CM

## 2024-07-16 PROBLEM — E11.9 TYPE 2 DIABETES MELLITUS WITHOUT COMPLICATIONS: Chronic | Status: ACTIVE | Noted: 2024-05-23

## 2024-07-22 ENCOUNTER — RESULT REVIEW (OUTPATIENT)
Age: 74
End: 2024-07-22

## 2024-07-22 ENCOUNTER — APPOINTMENT (OUTPATIENT)
Dept: INFUSION THERAPY | Facility: HOSPITAL | Age: 74
End: 2024-07-22

## 2024-07-22 ENCOUNTER — APPOINTMENT (OUTPATIENT)
Dept: HEMATOLOGY ONCOLOGY | Facility: CLINIC | Age: 74
End: 2024-07-22

## 2024-07-22 LAB
ANISOCYTOSIS BLD QL: SIGNIFICANT CHANGE UP
BASOPHILS # BLD AUTO: 0 K/UL — SIGNIFICANT CHANGE UP (ref 0–0.2)
BASOPHILS NFR BLD AUTO: 0 % — SIGNIFICANT CHANGE UP (ref 0–2)
BLASTS # FLD: 1 % — HIGH (ref 0–0)
DACRYOCYTES BLD QL SMEAR: SLIGHT — SIGNIFICANT CHANGE UP
ELLIPTOCYTES BLD QL SMEAR: SLIGHT — SIGNIFICANT CHANGE UP
EOSINOPHIL # BLD AUTO: 0.36 K/UL — SIGNIFICANT CHANGE UP (ref 0–0.5)
EOSINOPHIL NFR BLD AUTO: 4 % — SIGNIFICANT CHANGE UP (ref 0–6)
HCT VFR BLD CALC: 38.5 % — LOW (ref 39–50)
HGB BLD-MCNC: 12.4 G/DL — LOW (ref 13–17)
LG PLATELETS BLD QL AUTO: SIGNIFICANT CHANGE UP
LYMPHOCYTES # BLD AUTO: 2.99 K/UL — SIGNIFICANT CHANGE UP (ref 1–3.3)
LYMPHOCYTES # BLD AUTO: 33 % — SIGNIFICANT CHANGE UP (ref 13–44)
MCHC RBC-ENTMCNC: 27 PG — SIGNIFICANT CHANGE UP (ref 27–34)
MCHC RBC-ENTMCNC: 32.2 G/DL — SIGNIFICANT CHANGE UP (ref 32–36)
MCV RBC AUTO: 83.9 FL — SIGNIFICANT CHANGE UP (ref 80–100)
METAMYELOCYTES # FLD: 1 % — HIGH (ref 0–0)
MONOCYTES # BLD AUTO: 0.36 K/UL — SIGNIFICANT CHANGE UP (ref 0–0.9)
MONOCYTES NFR BLD AUTO: 4 % — SIGNIFICANT CHANGE UP (ref 2–14)
MYELOCYTES NFR BLD: 3 % — HIGH (ref 0–0)
NEUTROPHILS # BLD AUTO: 4.89 K/UL — SIGNIFICANT CHANGE UP (ref 1.8–7.4)
NEUTROPHILS NFR BLD AUTO: 54 % — SIGNIFICANT CHANGE UP (ref 43–77)
NRBC # BLD: 50 /100 WBCS — HIGH (ref 0–0)
NRBC # BLD: SIGNIFICANT CHANGE UP /100 WBCS (ref 0–0)
PLAT MORPH BLD: NORMAL — SIGNIFICANT CHANGE UP
PLATELET # BLD AUTO: 935 K/UL — HIGH (ref 150–400)
POIKILOCYTOSIS BLD QL AUTO: SIGNIFICANT CHANGE UP
POLYCHROMASIA BLD QL SMEAR: SLIGHT — SIGNIFICANT CHANGE UP
RBC # BLD: 4.59 M/UL — SIGNIFICANT CHANGE UP (ref 4.2–5.8)
RBC # FLD: 31.9 % — HIGH (ref 10.3–14.5)
RBC BLD AUTO: ABNORMAL
SCHISTOCYTES BLD QL AUTO: SLIGHT — SIGNIFICANT CHANGE UP
TARGETS BLD QL SMEAR: SIGNIFICANT CHANGE UP
WBC # BLD: 9.05 K/UL — SIGNIFICANT CHANGE UP (ref 3.8–10.5)
WBC # FLD AUTO: 9.05 K/UL — SIGNIFICANT CHANGE UP (ref 3.8–10.5)

## 2024-07-29 ENCOUNTER — RESULT REVIEW (OUTPATIENT)
Age: 74
End: 2024-07-29

## 2024-07-29 ENCOUNTER — APPOINTMENT (OUTPATIENT)
Dept: HEMATOLOGY ONCOLOGY | Facility: CLINIC | Age: 74
End: 2024-07-29

## 2024-07-29 LAB
ANISOCYTOSIS BLD QL: SIGNIFICANT CHANGE UP
BASOPHILS # BLD AUTO: 0 K/UL — SIGNIFICANT CHANGE UP (ref 0–0.2)
BASOPHILS NFR BLD AUTO: 0 % — SIGNIFICANT CHANGE UP (ref 0–2)
BLASTS # FLD: 3 % — HIGH (ref 0–0)
DACRYOCYTES BLD QL SMEAR: SLIGHT — SIGNIFICANT CHANGE UP
ELLIPTOCYTES BLD QL SMEAR: SLIGHT — SIGNIFICANT CHANGE UP
EOSINOPHIL # BLD AUTO: 0.21 K/UL — SIGNIFICANT CHANGE UP (ref 0–0.5)
EOSINOPHIL NFR BLD AUTO: 3 % — SIGNIFICANT CHANGE UP (ref 0–6)
HCT VFR BLD CALC: 37.6 % — LOW (ref 39–50)
HGB BLD-MCNC: 11.6 G/DL — LOW (ref 13–17)
LG PLATELETS BLD QL AUTO: SIGNIFICANT CHANGE UP
LYMPHOCYTES # BLD AUTO: 1.44 K/UL — SIGNIFICANT CHANGE UP (ref 1–3.3)
LYMPHOCYTES # BLD AUTO: 21 % — SIGNIFICANT CHANGE UP (ref 13–44)
MCHC RBC-ENTMCNC: 27 PG — SIGNIFICANT CHANGE UP (ref 27–34)
MCHC RBC-ENTMCNC: 30.9 G/DL — LOW (ref 32–36)
MCV RBC AUTO: 87.4 FL — SIGNIFICANT CHANGE UP (ref 80–100)
MONOCYTES # BLD AUTO: 0.34 K/UL — SIGNIFICANT CHANGE UP (ref 0–0.9)
MONOCYTES NFR BLD AUTO: 5 % — SIGNIFICANT CHANGE UP (ref 2–14)
MYELOCYTES NFR BLD: 3 % — HIGH (ref 0–0)
NEUTROPHILS # BLD AUTO: 4.45 K/UL — SIGNIFICANT CHANGE UP (ref 1.8–7.4)
NEUTROPHILS NFR BLD AUTO: 65 % — SIGNIFICANT CHANGE UP (ref 43–77)
NRBC # BLD: 86 /100 WBCS — HIGH (ref 0–0)
NRBC # BLD: SIGNIFICANT CHANGE UP /100 WBCS (ref 0–0)
PLAT MORPH BLD: NORMAL — SIGNIFICANT CHANGE UP
PLATELET # BLD AUTO: 577 K/UL — HIGH (ref 150–400)
POIKILOCYTOSIS BLD QL AUTO: SIGNIFICANT CHANGE UP
POLYCHROMASIA BLD QL SMEAR: SLIGHT — SIGNIFICANT CHANGE UP
RBC # BLD: 4.3 M/UL — SIGNIFICANT CHANGE UP (ref 4.2–5.8)
RBC # FLD: 32.8 % — HIGH (ref 10.3–14.5)
RBC BLD AUTO: ABNORMAL
SCHISTOCYTES BLD QL AUTO: SLIGHT — SIGNIFICANT CHANGE UP
TARGETS BLD QL SMEAR: SIGNIFICANT CHANGE UP
WBC # BLD: 6.85 K/UL — SIGNIFICANT CHANGE UP (ref 3.8–10.5)
WBC # FLD AUTO: 6.85 K/UL — SIGNIFICANT CHANGE UP (ref 3.8–10.5)

## 2024-07-30 ENCOUNTER — NON-APPOINTMENT (OUTPATIENT)
Age: 74
End: 2024-07-30

## 2024-07-31 ENCOUNTER — APPOINTMENT (OUTPATIENT)
Dept: HEMATOLOGY ONCOLOGY | Facility: CLINIC | Age: 74
End: 2024-07-31
Payer: MEDICARE

## 2024-07-31 VITALS
DIASTOLIC BLOOD PRESSURE: 79 MMHG | BODY MASS INDEX: 23.51 KG/M2 | RESPIRATION RATE: 18 BRPM | TEMPERATURE: 98 F | WEIGHT: 158.73 LBS | OXYGEN SATURATION: 96 % | HEART RATE: 80 BPM | HEIGHT: 68.74 IN | SYSTOLIC BLOOD PRESSURE: 127 MMHG

## 2024-07-31 DIAGNOSIS — R82.993 HYPERURICOSURIA: ICD-10-CM

## 2024-07-31 DIAGNOSIS — C94.6 MYELODYSPLASTIC DISEASE, NOT CLASSIFIED: ICD-10-CM

## 2024-07-31 DIAGNOSIS — C92.10 CHRONIC MYELOID LEUKEMIA, BCR/ABL-POSITIVE, NOT HAVING ACHIEVED REMISSION: ICD-10-CM

## 2024-07-31 DIAGNOSIS — R60.9 EDEMA, UNSPECIFIED: ICD-10-CM

## 2024-07-31 DIAGNOSIS — I26.99 OTHER PULMONARY EMBOLISM W/OUT ACUTE COR PULMONALE: ICD-10-CM

## 2024-07-31 DIAGNOSIS — D75.81 MYELOFIBROSIS: ICD-10-CM

## 2024-07-31 LAB — T(9;22)(ABL1,BCR)/CONTROL BLD/T: NORMAL

## 2024-07-31 PROCEDURE — 99214 OFFICE O/P EST MOD 30 MIN: CPT

## 2024-07-31 RX ORDER — APIXABAN 5 MG/1
5 TABLET, FILM COATED ORAL
Qty: 60 | Refills: 3 | Status: ACTIVE | COMMUNITY
Start: 2024-07-31 | End: 1900-01-01

## 2024-07-31 RX ORDER — ALLOPURINOL 100 MG/1
100 TABLET ORAL DAILY
Qty: 30 | Refills: 2 | Status: ACTIVE | COMMUNITY
Start: 2024-07-31 | End: 1900-01-01

## 2024-07-31 RX ORDER — FUROSEMIDE 40 MG/1
40 TABLET ORAL DAILY
Qty: 14 | Refills: 0 | Status: ACTIVE | COMMUNITY
Start: 2024-07-31 | End: 1900-01-01

## 2024-07-31 NOTE — REVIEW OF SYSTEMS
[Fatigue] : fatigue [Lower Ext Edema] : lower extremity edema [Negative] : Respiratory [Fever] : no fever [Chills] : no chills [Night Sweats] : no night sweats [Recent Change In Weight] : ~T no recent weight change [Chest Pain] : no chest pain [Palpitations] : no palpitations [Leg Claudication] : no intermittent leg claudication

## 2024-07-31 NOTE — ASSESSMENT
[FreeTextEntry1] : 74-year-old Mr. BAIN is seen in follow up for anemia, thrombocytosis and rule out myelofibrosis. Patient was initially seen more than a year ago (02/2022). He was and continues to be completely asymptomatic. His anemia was normocytic, he has had low folic acid that normalized after folate supplementation. Noted to have mild high ferritin and persistent moderately high LDH in the absence evidence of hemolysis. He did not have splenomegaly on physical exam (02/2022). Intramedullary hemolysis may have happened due to folate deficiency; However, a primary bone marrow process like PMF could not be ruled out. This year (10/2023) he returned for further w/u.  He underwent a bone marrow biopsy. Results are interesting:  Bone marrow biopsy and bone marrow aspirate - Myeloid neoplasm with concurrent BCR-ABL1 translocation, and JAK2 and SF3B1 co-mutations and severe fibrosis, dyserythropoiesis (ring sideroblasts), markedly increased in megakaryocytes, atypical megakaryocytes  (some small some enlarged with multi-lobated nuclei, some naked nuclei) no excess blasts.   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, BCR::ABL positive thrombocytosis)   [ ] MDS/MPN overlap syndrome - Dyserythropoiesis (ring sideroblast) myelofibrosis/thrombocytosis (SF3B1 and JAK2 positive)  - JAK2 assay from 39.7% (Nov 2023) >> 47% (April 2024) - Obtained an abdominal ultrasound - splenomegaly (19.3cm) - On Luspatercept 1 mg/kg Q3 weeks, titrated up to 1.33mg/kg due to poor response in February 2024 - Currently on Jakafi 20mg BID however platelets still harboring in 900k range - Despite high platelets, spleen no longer palpable - Titrate up on Jakafi 25mg BID for thrombocytosis (4/10/24) - Pt w/ symptomatic anemia w/ Hgb of 7.9 today - Discontinue Jakafi  - Start Anagrelide 0.5mg BID  - Continue ASA 81 mg daily - Inc Luspatercept to 1.75mg/kg q3 weeks - Added Ojjaara 200mg daily in June 2024 - Self discontinued all medications - Advised pt to restart Ojjaara - Hold off Jakafi and Anagrelide since Ojjaara seems to have lowered platelet count - Obtain spleen US   - UA 11.6, LDH 1220; start Allopurinol  - Continue weekly CBCs  [ ] BCR::ABL1 positive CML  - p190 > 10% (11/2023) >>> 2.050% (4/2024) - Pt should continually be on Imatinib but w/ poor compliance.. reviewed again that he should be taking Jakafi, Imatinib and ASA  - BCR:ABL PCR pending today - BCR::ABL PCR Q3 months - Continue Imatinib 400mg daily  [ ] PE - PE diagnosed in May after experiencing SOB  - Discharged on Eliquis and ASA from hospital - Completed loading dose Eliquis then self discontinued due to oral bleed - Restart Eliquis maintenance dose, Rx sent - Hold ASA while on Eliquis - Pt to call and let us know if he experiences bleeds again  [ ] Leg swelling - B/l lower ext swelling and weakness - No erythema, pain, warmth to touch - Notes improvement with elevation - Lasix 40mg daily x 7 days  - Compression stockings encouraged  ** Of side note, pt reports that he is 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 Mayers

## 2024-07-31 NOTE — CONSULT LETTER
[FreeTextEntry3] : Jose Mayers MD, PhD, MS \par  Attending Physician \par  Hematology-Oncology \par  [facility]\par

## 2024-07-31 NOTE — PHYSICAL EXAM
[de-identified] : no longer palpale spleen, palpable spleen 5-6 cm below the costal margin during January visit

## 2024-07-31 NOTE — RESULTS/DATA
[FreeTextEntry1] : 7/31/2024 WBC normal Hgb 11.6, platelets 577k UA 11.6 LDH 1220  5/20/2024 Hgb 7.9, thrombocytosis 795k Pending CMP, LDH, UA, cardiac enzymes, T&S EKG - NSR w/ t wave abnormality HR 91  4/10/24 Hgb 10.5 Platelet 925k  1/5/24 HGB improved Platelet count decreased 704K < 900K  12/13/23 WBC 5.96 Hgb 8.6 >> 9.5 >> 9.9 >> 10.4 Platelet 450k Pending CMP, LDH, Ferritin   11/15/2023 BCR Final Report  Accession Number: [id number] Date Collected: 11/08/2023 Date Received: 11/09/2023 Date Reported: 11/10/2023 15:40  Specimen: .Blood Indication:  CML/ALL  Test Performed: Quantitative BCR-ABL by real time RT-PCR ________________________________________________________________  RESULTS:   p210: Not Detected   p190: Positive  %BCR-ABL/ABL= >10.000 %   INTERPRETATION:  The specimen analyzed contains the translocation mBCR-ABL1 (p190) associated with Acute Lymphoblastic leukemia. Clinical correlation is recommended.   	 Patient:     BRUCE BAIN   Accession:                             [id number]  Collected Date/Time:                   10/16/2023 17:15 EDT Received Date/Time:                    10/16/2023 20:36 EDT  Hematopathology Report - Auth (Verified)  Specimen(s) Submitted 1  Left pic 2  Bone marrow aspirate  Final Diagnosis 1, 2. Bone marrow biopsy and bone marrow aspirate      - Myeloid neoplasm with concurrent BCR-ABL1 translocation, and  JAK2 and SF3B1 co-mutations and severe fibrosis  See note and description.  Diagnostic note: Per chart review, patient has normocytic anemia with folic acid deficiency which has been corrected now and thrombocytosis in the range of 470K to 600K since December 2021. His hemoglobin levels are trending down from 12.6g/dL to 8.1g/dL. This is the first  bone marrow biopsy and patient has never been treated. Biopsy shows overtly fibrotic marrow with maturing trilineage hematopoiesis, markedly increased megakaryopoiesis and dyserythropoiesis (ring sideroblasts). There is no increase in blast population. Karyotype analysis shows t(9;22)(q34;q11.2) in 9 out of 20 metaphases.  Molecular studies show  JAK2 p.Jqh026Hiw and   SF3B1 p.Hze587Qte mutation. Overall the    findings are consistent with myeloid neoplasm with concurrent BCR-ABL1 translocation, and  JAK2 and SF3B1 co-mutations and severe fibrosis. 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 However the detection of BCR/ABL1 translocation by chromosome analysis is a   diagnostic feature of chronic myeloid leukemia.  Co-occurance of  BCR-ABL1 and JAK2 V617F  mutations have been described in small case series. The frequency of co-occurrence, the temporal order of acquisition and clonal relationship of these alterations is poorly understood and it is also unclear for this case since there is no prior bone marrow  biopsy or diagnostic work up. Literature of these rare cases shows many patients progress  to myelofibrosis like our patient, raising the possibility that the two mutant tyrosine    kinases may accelerate disease progression.  Clinical correlation is recommended.  Case is discussed with Dr Jose Mayers via phone call on 10/27/2023 3:30 pm  Microscopic description: 1. Biopsy: Quality:  Small (0.9 cm) core biopsy Cellularity:  30% (patchy cellularity with hematopoiesis alternating with hypocellular   areas of fibrotic stroma)    Myeloid lineage:  Decreased in number, exhibits full    maturation    Erythroid lineage:  Decreased in number, exhibits full    maturation with increased pronormoblasts    Megakaryocytes:  Markedly increased in number, atypical    megakaryocytes  (some small some enlarged with    multilobated nuclei, some naked nuclei ) forming large    clusters in the marrow and also within dilated sinusoids    Blasts:  Not increased    Lymphocytes  Not increased in number, mostly small mature    looking lymphocytes are seen    Plasma cells:  Not increased    Stroma:   Marked fibrosis with dilated sinusoids and    intrasinusoidal hematopoiesis    Clot:   No marrow fragments     2. Aspirate:    Myeloid lineage:  Exhibits full maturation, no    significant dysplastic        changes seen     Erythroid lineage:  Exhibit full maturation with    increased pronormoblasts, no significant dysplastic    changes seen Megakaryocytes:  Rare Lymphocytes:  Not increased Plasma cells:   Not increased Blasts:    Not increased  Bone Marrow Asp Smear Diff Cell Count:  200 Cells. Type  Normal* % Blast  0-3 0% Promyelocyte  1-8 0% Myelocyte  10-15 3% Metamyelocyte  10-15 3% Band/Neutrophil  12-25  37% Eosinophil  1-5 1% Basophil  0-1 0% Pronormoblast  0-2 4% Normoblast  15-25 35% Monocyte  0-2 0% Lymphocyte  10-15 17% Plasma cell  0-1 0%   *Adult Range  Peripheral Blood Smear:   Not available  Ancillary studies Special stains on bone marrow aspirate: Iron stain:  No spicules are present to evaluate for iron stores. There is increase in ring sideroblasts (13%).  Special stains on core biopsy: Reticulin:  Diffuse and dense increase in reticulin with extensive intersections and coarse bundles of thick fibers consistent with collagen (MF=3) Trichrome:  Collagen fibrosis Iron:  Positive  Immunohistochemical stains:    CD34, , MPO, Factor VIII, CD71, E-cad, p53 stains are performed on block 1A  CD34:  Highlights vascular structures, no increase in blast population :  Highlights  scattered masts cells. No increase in blast population MPO:  Highlights myeloid series, decreased Factor VIII:  Highlights megakaryocytes, markedly increased, including occasional small forms CD71 : Highlights erythroid series, decreased E-cad:  Highlights early erythroid series, increased p53:  Normal staining pattern  Flow cytometry analysis (01-NN-):    CD45/side scatter shows myeloblast population (1.21% of total) with normal antigen maturation pattern.  There is no increase in CD34, CD14 or  positive cells. Myeloid antigen pattern is normal with CD13, CD16 and CD11b. Monocytes show normal antigen maturation pattern Lymphocytes (11% of cells): Heterogeneous population of T-cells (with a  CD4 to CD8 ratio 2.31) with no aberrancy or loss of pan-T cell antigen, immunophenotypically unremarkable natural killer cells, and polytypic B-cells  Cytogenetics: Karyotype ([id number]) :   46,XY,t(9;22)(q34;q11.2)[9]/46,XY[11]  Nine of the twenty metaphases examined revealed a translocation involving the long arms of chromosomes 9 and 22 giving rise to the classic [place] chromosome.   The remaining metaphases had an apparently normal karyotype.  Please note that the Initial FISH studies showed normal results for BCR::ABL1 fusion however, subsequent g-banded karyotyping showed a translocation involving chromosomes 9 and 22, with a variant of the classic t(9;22)(q34;q11.2). Therefore, sequential FISH studies were performed using   A  dual-fusion three color probe for BCR and ABL1/ASS1, which showed an atypical signal pattern with A a single BCR::ABL1 fusion signal on alley(22) homologue,    one orange, one green and one aqua instead of two fusion, one orange, one green and two aqua.  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  Additionally, retrospective and repeat FISH studies using dual-fusion three color probe for BCR and ABL1/ASS1 in interphase cells still showed normal results due to the reason mentioned above.  Fluorescence in situ Hybridization ([id number], [id number], [id number]): NORMAL FISH - BCR::ABL1 NORMAL FISH - 5q NORMAL FISH - MPD PANEL  Probe(s) and Location(s): ASS1, ABL1 (9q34), BCR (22q11.2) ISCN Nomenclature: nuc zak(ASS1,ABL1,BCR)x2[194/200] Probe(s) and Location(s):   E2X733/ D5S23 (5p15.2), EGR1 (5q31) ISCN Nomenclature:  nuc zak(F1N935/D5S23,EGR1)x2[198/200] Probe(s) and Location(s):   PDGFRA LSI 4q12 (4q12) ([employer]), PDGFRB (4o05-v85) ([employer]), FGFR1 (8p11.23-p11.2), CEP 8/D8Z2(8p11.1-q11.1) ([employer]) ISCN Nomenclature:  nuc zak(SCFD2,LNX,PDGFRA)x2[198/200],(PDGFRBx2)[197/200 ], (FGFR1,D8Z2)x2[198/200]  Molecular [facility]: [facility]  DETECTED GENOMIC ALTERATIONS:   Tier I: Variants of Strong Clinical Significance   JAK2 p.Ith152Efl  Chr: 9   Pos: [id number]   Ref: G   Alt: T    Coverage: 792    Allele Freq: 46.34   cDNA change: c.1849G>T   Exon: 14   ClinVar ID: -    SF3B1 p.Dnh944Liw   Chr: 2   Pos: [id number]   Ref: T   Alt: C   Coverage: 613   Allele Freq: 43.88   cDNA change: c.2098A>G   Exon: 15   ClinVar ID: -    Tier II: Variants of Potential Clinical Significance   ATRX p.[id number]   Chr: X   Pos: [id number]   Ref: CT   Alt: C   Coverage: 673   Allele Freq: 5.65   cDNA change: c.6848del   Exon: 31   ClinVar ID: -  PERTINENT NEGATIVE RESULTS:   The following genes are NEGATIVE for clinically relevant mutations.   Mutational hotspots and surrounding exonic regions were interrogated   for DNA level point mutations and indels (fusions not assayed).   ABL1, ANKRD26, ASXL1, BCOR, BCORL1, BRAF, CALR, CBL, CCND2, CDKN2A,   CEBPA, CSF3R, CUX1, DDX41, DNMT3A, ETNK1, ETV6, EZH2, FBXW7, FLT3,   GATA2, HRAS, IDH1, IDH2, KDM6A, KIT, KMT2A, KRAS, MAP2K1, MPL,   MYD88, NF1, NPM1, NRAS, PDGFRA, PHF6, PTEN, PTPN11, RUNX1, SETBP1,   SRSF2, STAG2, TET2, TP53, U2AF1, WT1, ZRSR2  JAK2  V617F Activating Mutation Assay by Real-Time PCR ([id number]): Positive JAK2 V617F %: 39.7 %  Comment: Iron stain (examined to evaluate for iron stores, see microscopic description) and Giemsa stain (shows appropriate staining pattern) are performed on blocks 1A, 1B  Verified by: Michael Herman MD (Electronic Signature) Reported on: 11/03/23 12:39 EDT, [facility], [address] [place] [address] Phone: [phone]   Fax: [phone] _________________________________________________________________  Clinical Information *** The external document could not be loaded. ***   Gross Description 1. The specimen is received in Bouins fixative and the specimen container is labeled    "L Pic". It consists of one bone marrow core measuring 0.9 x 0.2 cm with a 0.8 x 0.8 x 0.1 cm aggregate of clotted blood. Entirely submitted in 2 cassettes:   1A: Bone marrow core   1B: Blood clot  2. Two Barba-Giemsa and one iron stained bone marrow aspirate smears are submitted.  Tia CANTU 10/17/2023 06:36 AM.  Disclaimer In addition to other data that may appear on the specimen containers, all labels have been inspected to confirm the presence of the patients name and date of birth.  Histological Processing Performed at [facility], Department of Pathology, [address] [place] [address].    10/11/2023 Normocytic anemia - Hgb 8.5 Thrombocytosis Plt 573 Manual differential reportedly showed 2-3% blasts    2/4/2022  Available labs reviewed, relevant data as below: Anemia, Hgb 8.6, normal MCV (84); no retic response, Normal iron studies, high ferritin (485),  Low Folic acid  (3.3), normal  B12 Thrombocytosis (506).   LDH high at ~750  Haptoglobin normal range.

## 2024-07-31 NOTE — HISTORY OF PRESENT ILLNESS
[de-identified] : 71 year-old Mr. BAIN is seen in consult for "anemia, thrombocytosis, r/o myelofibrosis". Patient states that since Sept 2021 he has been feeling like flies crawling inside his head when he is smelling food or eating; then the flies crawl to his legs. He went to his PCP for this symptom where after a lot of blood work he was found to be anemic, folate deficient. He has been on folic acid since mid January. Lab work also showed thrombocytosis (506). His Hgb was 8.6, MCV normal range, no  retic response, normal iron studies, high ferritin (485), normal  B12. Of note LDH was high at ~775, haptoglobin was normal range (~75). The patient denies any fatigue, weight loss, night sweats, fever. He has no complaints other than the "flies" which have long been abundant in his apartment and now they are crawling in his brain. Of note, patient has no other medical condition and he exercises regularly.   [de-identified] : 10/11/2023 Patient presents for a follow up. He was initially seen in 02/2022. He never returned for a follow up. He has done well in the interim. Denies any major change in his health status. He returned for a follow up as his PCP insisted him so. He admits to some fatigue. Denies any weight loss or early satiety. He denies any abnormal bleeding or thrombosis.   11/145/2023 Patient returns for a follow up and discuss the lab and biopsy results. He endorses no changes in his health status. He had a bone marrow biopsy done in the interim. most of the results are now available. Detailed results are copied in the Results section.   12/13/23 Patient seen in follow up. Since previous visit, he reported that he has not picked up Jakafi or Gleevec yet.. Re-iterated to the pt how important it is to start his medications ASAP to control his disease. He has started Luspatercept and responding well. Today his Hgb is 10.4! Denies fevers, night sweats, unintentional weight loss. No changes in health. Good appetite, stable weight   1/3/23 Patient seen in follow up. Started Jakafi and Gleevec, on Luspatercept plan for treatment today. Hgb today 10.2, platelets 704 (improvement from prior level of 900s). Patient denies any joint pains, no recent infections, denies fevers, night sweats, unintentional weight loss. No changes in health. Good appetite, stable weight.   4/10/2024 Patient seen in follow up. We spoke last on 2/2 in regards to his new POC which was to restart Imatinib 400mg (renewed as per his request), and continue Jakafi 20mg BID and ASA 81mg. HOLD Hydrea for now. We will titrate up on his Luspatercept to 1.33mg/kg as he was not responding well. There has been many challenges with communicating with the pt as he works night shifts. We have left multiple VMs to discuss BW results however was unable to reach him. Today his Hgb has improved but platelets remain high at 925k. He noticed that his spleen size has decreased. He notes some headaches, relieved with Tylenol  5/20/2024 Patient seen in urgent visit for SOB. As per pt he has been experiencing SOB with exertion since increasing Jakafi dose to 25mg BID. He has been experiencing SOB. + Fatigue. Some left sided chest pressure with exertion. He appears well, can carry a conversation without difficulty breathing. Denies dizziness, chest pain, BRBPR, melena, hematuria. No visible blood loss. His Hgb today is 7.9 - he has self discontinued Luspatercept and his last dose was 4/17. He will be receiving Luspatercept today at an increased titrated dose of 1.75mg/kg. His Jakafi has been decreased to 15mg BID- pt stopped since 5/14. Due to start Anagrelide 0.5mg BID today  7/31/2024 Patient seen in follow up. It has been very hard to reach him to follow up and share treatment plans. Interim, he has been hospitalized for PE and discharged with both ASA and Eliquis. As per pt, developed bleeding from his throat and discontinued both ASA and Eliquis without any consultation with the medical team. He has been having increased fatigue, sleeping for more than 12 hrs, continued headache, ankle swelling. He has self continued Imatinib, Jakafi, Anagrelide, ASA, Eliquis.

## 2024-07-31 NOTE — END OF VISIT
[FreeTextEntry3] : Patient seen with fellow who documented the visit. I reviewed and edited the note as needed.

## 2024-08-01 LAB
JAK2 P.V617F BLD/T QL: NORMAL
REFLEX:: NORMAL

## 2024-08-05 ENCOUNTER — RESULT REVIEW (OUTPATIENT)
Age: 74
End: 2024-08-05

## 2024-08-05 ENCOUNTER — APPOINTMENT (OUTPATIENT)
Dept: HEMATOLOGY ONCOLOGY | Facility: CLINIC | Age: 74
End: 2024-08-05

## 2024-08-05 LAB
ANISOCYTOSIS BLD QL: SIGNIFICANT CHANGE UP
BASOPHILS # BLD AUTO: 0.07 K/UL — SIGNIFICANT CHANGE UP (ref 0–0.2)
BASOPHILS NFR BLD AUTO: 1 % — SIGNIFICANT CHANGE UP (ref 0–2)
BLASTS # FLD: 2 % — HIGH (ref 0–0)
DACRYOCYTES BLD QL SMEAR: SLIGHT — SIGNIFICANT CHANGE UP
ELLIPTOCYTES BLD QL SMEAR: SLIGHT — SIGNIFICANT CHANGE UP
EOSINOPHIL # BLD AUTO: 0.14 K/UL — SIGNIFICANT CHANGE UP (ref 0–0.5)
EOSINOPHIL NFR BLD AUTO: 2 % — SIGNIFICANT CHANGE UP (ref 0–6)
GIANT PLATELETS BLD QL SMEAR: PRESENT — SIGNIFICANT CHANGE UP
HCT VFR BLD CALC: 35.5 % — LOW (ref 39–50)
HGB BLD-MCNC: 11.3 G/DL — LOW (ref 13–17)
LG PLATELETS BLD QL AUTO: SIGNIFICANT CHANGE UP
LYMPHOCYTES # BLD AUTO: 1.72 K/UL — SIGNIFICANT CHANGE UP (ref 1–3.3)
LYMPHOCYTES # BLD AUTO: 25 % — SIGNIFICANT CHANGE UP (ref 13–44)
MCHC RBC-ENTMCNC: 26.9 PG — LOW (ref 27–34)
MCHC RBC-ENTMCNC: 31.8 G/DL — LOW (ref 32–36)
MCV RBC AUTO: 84.5 FL — SIGNIFICANT CHANGE UP (ref 80–100)
MONOCYTES # BLD AUTO: 0.55 K/UL — SIGNIFICANT CHANGE UP (ref 0–0.9)
MONOCYTES NFR BLD AUTO: 8 % — SIGNIFICANT CHANGE UP (ref 2–14)
MYELOCYTES NFR BLD: 4 % — HIGH (ref 0–0)
NEUTROPHILS # BLD AUTO: 3.99 K/UL — SIGNIFICANT CHANGE UP (ref 1.8–7.4)
NEUTROPHILS NFR BLD AUTO: 58 % — SIGNIFICANT CHANGE UP (ref 43–77)
NRBC # BLD: 46 /100 WBCS — HIGH (ref 0–0)
NRBC # BLD: SIGNIFICANT CHANGE UP /100 WBCS (ref 0–0)
PAPPENHEIMER BOD BLD QL SMEAR: PRESENT — SIGNIFICANT CHANGE UP
PLAT MORPH BLD: ABNORMAL
PLATELET # BLD AUTO: 526 K/UL — HIGH (ref 150–400)
POIKILOCYTOSIS BLD QL AUTO: SIGNIFICANT CHANGE UP
POLYCHROMASIA BLD QL SMEAR: SIGNIFICANT CHANGE UP
RBC # BLD: 4.2 M/UL — SIGNIFICANT CHANGE UP (ref 4.2–5.8)
RBC # FLD: 31.9 % — HIGH (ref 10.3–14.5)
RBC BLD AUTO: ABNORMAL
SCHISTOCYTES BLD QL AUTO: SLIGHT — SIGNIFICANT CHANGE UP
TARGETS BLD QL SMEAR: SIGNIFICANT CHANGE UP
WBC # BLD: 6.88 K/UL — SIGNIFICANT CHANGE UP (ref 3.8–10.5)
WBC # FLD AUTO: 6.88 K/UL — SIGNIFICANT CHANGE UP (ref 3.8–10.5)

## 2024-08-12 ENCOUNTER — APPOINTMENT (OUTPATIENT)
Dept: HEMATOLOGY ONCOLOGY | Facility: CLINIC | Age: 74
End: 2024-08-12

## 2024-08-12 ENCOUNTER — RESULT REVIEW (OUTPATIENT)
Age: 74
End: 2024-08-12

## 2024-08-12 DIAGNOSIS — G47.00 INSOMNIA, UNSPECIFIED: ICD-10-CM

## 2024-08-12 LAB
ACANTHOCYTES BLD QL SMEAR: SLIGHT — SIGNIFICANT CHANGE UP
ANISOCYTOSIS BLD QL: SIGNIFICANT CHANGE UP
BASOPHILS # BLD AUTO: 0 K/UL — SIGNIFICANT CHANGE UP (ref 0–0.2)
BASOPHILS NFR BLD AUTO: 0 % — SIGNIFICANT CHANGE UP (ref 0–2)
BLASTS # FLD: 1 % — HIGH (ref 0–0)
DACRYOCYTES BLD QL SMEAR: SLIGHT — SIGNIFICANT CHANGE UP
ELLIPTOCYTES BLD QL SMEAR: SLIGHT — SIGNIFICANT CHANGE UP
EOSINOPHIL # BLD AUTO: 0.41 K/UL — SIGNIFICANT CHANGE UP (ref 0–0.5)
EOSINOPHIL NFR BLD AUTO: 5 % — SIGNIFICANT CHANGE UP (ref 0–6)
HCT VFR BLD CALC: 36 % — LOW (ref 39–50)
HGB BLD-MCNC: 10.9 G/DL — LOW (ref 13–17)
HYPOCHROMIA BLD QL: SLIGHT — SIGNIFICANT CHANGE UP
LG PLATELETS BLD QL AUTO: SIGNIFICANT CHANGE UP
LYMPHOCYTES # BLD AUTO: 2.65 K/UL — SIGNIFICANT CHANGE UP (ref 1–3.3)
LYMPHOCYTES # BLD AUTO: 32 % — SIGNIFICANT CHANGE UP (ref 13–44)
MCHC RBC-ENTMCNC: 26.1 PG — LOW (ref 27–34)
MCHC RBC-ENTMCNC: 30.3 G/DL — LOW (ref 32–36)
MCV RBC AUTO: 86.1 FL — SIGNIFICANT CHANGE UP (ref 80–100)
MONOCYTES # BLD AUTO: 0.08 K/UL — SIGNIFICANT CHANGE UP (ref 0–0.9)
MONOCYTES NFR BLD AUTO: 1 % — LOW (ref 2–14)
MYELOCYTES NFR BLD: 4 % — HIGH (ref 0–0)
NEUTROPHILS # BLD AUTO: 4.72 K/UL — SIGNIFICANT CHANGE UP (ref 1.8–7.4)
NEUTROPHILS NFR BLD AUTO: 57 % — SIGNIFICANT CHANGE UP (ref 43–77)
NRBC # BLD: 41 /100 WBCS — HIGH (ref 0–0)
NRBC # BLD: SIGNIFICANT CHANGE UP /100 WBCS (ref 0–0)
PLAT MORPH BLD: ABNORMAL
PLATELET # BLD AUTO: 516 K/UL — HIGH (ref 150–400)
POIKILOCYTOSIS BLD QL AUTO: SIGNIFICANT CHANGE UP
POLYCHROMASIA BLD QL SMEAR: SLIGHT — SIGNIFICANT CHANGE UP
RBC # BLD: 4.18 M/UL — LOW (ref 4.2–5.8)
RBC # FLD: 31.7 % — HIGH (ref 10.3–14.5)
RBC BLD AUTO: ABNORMAL
SCHISTOCYTES BLD QL AUTO: SLIGHT — SIGNIFICANT CHANGE UP
TARGETS BLD QL SMEAR: SIGNIFICANT CHANGE UP
WBC # BLD: 8.28 K/UL — SIGNIFICANT CHANGE UP (ref 3.8–10.5)
WBC # FLD AUTO: 8.28 K/UL — SIGNIFICANT CHANGE UP (ref 3.8–10.5)

## 2024-08-12 RX ORDER — GLUCOSAMINE/MSM/CHONDROIT SULF 500-166.6
10 TABLET ORAL
Qty: 60 | Refills: 0 | Status: ACTIVE | COMMUNITY
Start: 2024-08-12 | End: 1900-01-01

## 2024-08-13 DIAGNOSIS — C94.6 MYELODYSPLASTIC DISEASE, NOT CLASSIFIED: ICD-10-CM

## 2024-08-13 LAB
ALBUMIN SERPL ELPH-MCNC: 3.6 G/DL
ALP BLD-CCNC: 76 U/L
ALT SERPL-CCNC: 47 U/L
ANION GAP SERPL CALC-SCNC: 14 MMOL/L
AST SERPL-CCNC: 40 U/L
BILIRUB SERPL-MCNC: 1.4 MG/DL
BUN SERPL-MCNC: 13 MG/DL
CALCIUM SERPL-MCNC: 8.4 MG/DL
CHLORIDE SERPL-SCNC: 105 MMOL/L
CO2 SERPL-SCNC: 22 MMOL/L
CREAT SERPL-MCNC: 0.99 MG/DL
EGFR: 79 ML/MIN/1.73M2
GLUCOSE SERPL-MCNC: 134 MG/DL
MAGNESIUM SERPL-MCNC: 1.9 MG/DL
POTASSIUM SERPL-SCNC: 4.8 MMOL/L
PROT SERPL-MCNC: 5.6 G/DL
SODIUM SERPL-SCNC: 141 MMOL/L
URATE SERPL-MCNC: 8.9 MG/DL

## 2024-08-14 ENCOUNTER — INPATIENT (INPATIENT)
Facility: HOSPITAL | Age: 74
LOS: 4 days | Discharge: HOME HEALTH | DRG: 287 | End: 2024-08-19
Attending: STUDENT IN AN ORGANIZED HEALTH CARE EDUCATION/TRAINING PROGRAM | Admitting: STUDENT IN AN ORGANIZED HEALTH CARE EDUCATION/TRAINING PROGRAM
Payer: MEDICARE

## 2024-08-14 ENCOUNTER — RESULT REVIEW (OUTPATIENT)
Age: 74
End: 2024-08-14

## 2024-08-14 VITALS
OXYGEN SATURATION: 98 % | HEIGHT: 68.74 IN | WEIGHT: 164.91 LBS | DIASTOLIC BLOOD PRESSURE: 104 MMHG | HEART RATE: 106 BPM | RESPIRATION RATE: 22 BRPM | TEMPERATURE: 96 F | SYSTOLIC BLOOD PRESSURE: 146 MMHG

## 2024-08-14 DIAGNOSIS — H40.9 UNSPECIFIED GLAUCOMA: ICD-10-CM

## 2024-08-14 DIAGNOSIS — I50.9 HEART FAILURE, UNSPECIFIED: ICD-10-CM

## 2024-08-14 DIAGNOSIS — Z29.9 ENCOUNTER FOR PROPHYLACTIC MEASURES, UNSPECIFIED: ICD-10-CM

## 2024-08-14 DIAGNOSIS — D46.9 MYELODYSPLASTIC SYNDROME, UNSPECIFIED: ICD-10-CM

## 2024-08-14 DIAGNOSIS — I26.99 OTHER PULMONARY EMBOLISM WITHOUT ACUTE COR PULMONALE: ICD-10-CM

## 2024-08-14 DIAGNOSIS — E11.9 TYPE 2 DIABETES MELLITUS WITHOUT COMPLICATIONS: ICD-10-CM

## 2024-08-14 LAB
ADD ON TEST-SPECIMEN IN LAB: SIGNIFICANT CHANGE UP
ALBUMIN SERPL ELPH-MCNC: 3.3 G/DL — SIGNIFICANT CHANGE UP (ref 3.3–5)
ALBUMIN SERPL ELPH-MCNC: 3.8 G/DL — SIGNIFICANT CHANGE UP (ref 3.3–5)
ALP SERPL-CCNC: 81 U/L — SIGNIFICANT CHANGE UP (ref 40–120)
ALP SERPL-CCNC: 83 U/L — SIGNIFICANT CHANGE UP (ref 40–120)
ALT FLD-CCNC: 59 U/L — HIGH (ref 10–45)
ALT FLD-CCNC: 88 U/L — HIGH (ref 10–45)
ANION GAP SERPL CALC-SCNC: 11 MMOL/L — SIGNIFICANT CHANGE UP (ref 5–17)
ANION GAP SERPL CALC-SCNC: 18 MMOL/L — HIGH (ref 5–17)
ANISOCYTOSIS BLD QL: SIGNIFICANT CHANGE UP
APTT BLD: 42.5 SEC — HIGH (ref 24.5–35.6)
AST SERPL-CCNC: 130 U/L — HIGH (ref 10–40)
AST SERPL-CCNC: 39 U/L — SIGNIFICANT CHANGE UP (ref 10–40)
BASOPHILS # BLD AUTO: 0.3 K/UL — HIGH (ref 0–0.2)
BASOPHILS NFR BLD AUTO: 3 % — HIGH (ref 0–2)
BILIRUB SERPL-MCNC: 0.8 MG/DL — SIGNIFICANT CHANGE UP (ref 0.2–1.2)
BILIRUB SERPL-MCNC: 1 MG/DL — SIGNIFICANT CHANGE UP (ref 0.2–1.2)
BLASTS # FLD: 2 % — HIGH (ref 0–0)
BUN SERPL-MCNC: 20 MG/DL — SIGNIFICANT CHANGE UP (ref 7–23)
BUN SERPL-MCNC: 20 MG/DL — SIGNIFICANT CHANGE UP (ref 7–23)
BURR CELLS BLD QL SMEAR: PRESENT — SIGNIFICANT CHANGE UP
CALCIUM SERPL-MCNC: 8.6 MG/DL — SIGNIFICANT CHANGE UP (ref 8.4–10.5)
CALCIUM SERPL-MCNC: 8.8 MG/DL — SIGNIFICANT CHANGE UP (ref 8.4–10.5)
CHLORIDE SERPL-SCNC: 102 MMOL/L — SIGNIFICANT CHANGE UP (ref 96–108)
CHLORIDE SERPL-SCNC: 106 MMOL/L — SIGNIFICANT CHANGE UP (ref 96–108)
CO2 SERPL-SCNC: 15 MMOL/L — LOW (ref 22–31)
CO2 SERPL-SCNC: 20 MMOL/L — LOW (ref 22–31)
CREAT SERPL-MCNC: 1.09 MG/DL — SIGNIFICANT CHANGE UP (ref 0.5–1.3)
CREAT SERPL-MCNC: 1.11 MG/DL — SIGNIFICANT CHANGE UP (ref 0.5–1.3)
DACRYOCYTES BLD QL SMEAR: SIGNIFICANT CHANGE UP
EGFR: 69 ML/MIN/1.73M2 — SIGNIFICANT CHANGE UP
EGFR: 71 ML/MIN/1.73M2 — SIGNIFICANT CHANGE UP
ELLIPTOCYTES BLD QL SMEAR: SLIGHT — SIGNIFICANT CHANGE UP
EOSINOPHIL # BLD AUTO: 0.2 K/UL — SIGNIFICANT CHANGE UP (ref 0–0.5)
EOSINOPHIL NFR BLD AUTO: 2 % — SIGNIFICANT CHANGE UP (ref 0–6)
GLUCOSE SERPL-MCNC: 142 MG/DL — HIGH (ref 70–99)
GLUCOSE SERPL-MCNC: 208 MG/DL — HIGH (ref 70–99)
HCT VFR BLD CALC: 44.8 % — SIGNIFICANT CHANGE UP (ref 39–50)
HGB BLD-MCNC: 13.3 G/DL — SIGNIFICANT CHANGE UP (ref 13–17)
INR BLD: 2.15 RATIO — HIGH (ref 0.85–1.18)
LYMPHOCYTES # BLD AUTO: 2.91 K/UL — SIGNIFICANT CHANGE UP (ref 1–3.3)
LYMPHOCYTES # BLD AUTO: 29 % — SIGNIFICANT CHANGE UP (ref 13–44)
MACROCYTES BLD QL: SIGNIFICANT CHANGE UP
MANUAL SMEAR VERIFICATION: SIGNIFICANT CHANGE UP
MCHC RBC-ENTMCNC: 25.8 PG — LOW (ref 27–34)
MCHC RBC-ENTMCNC: 29.7 GM/DL — LOW (ref 32–36)
MCV RBC AUTO: 86.8 FL — SIGNIFICANT CHANGE UP (ref 80–100)
METAMYELOCYTES # FLD: 2 % — HIGH (ref 0–0)
MICROCYTES BLD QL: SLIGHT — SIGNIFICANT CHANGE UP
MONOCYTES # BLD AUTO: 0.5 K/UL — SIGNIFICANT CHANGE UP (ref 0–0.9)
MONOCYTES NFR BLD AUTO: 5 % — SIGNIFICANT CHANGE UP (ref 2–14)
MYELOCYTES NFR BLD: 1 % — HIGH (ref 0–0)
NEUTROPHILS # BLD AUTO: 5.61 K/UL — SIGNIFICANT CHANGE UP (ref 1.8–7.4)
NEUTROPHILS NFR BLD AUTO: 52 % — SIGNIFICANT CHANGE UP (ref 43–77)
NEUTS BAND # BLD: 4 % — SIGNIFICANT CHANGE UP (ref 0–8)
NRBC # BLD: 29 /100 WBCS — HIGH (ref 0–0)
NT-PROBNP SERPL-SCNC: 7210 PG/ML — HIGH (ref 0–300)
PLAT MORPH BLD: NORMAL — SIGNIFICANT CHANGE UP
PLATELET # BLD AUTO: 691 K/UL — HIGH (ref 150–400)
POIKILOCYTOSIS BLD QL AUTO: SIGNIFICANT CHANGE UP
POTASSIUM SERPL-MCNC: 4.1 MMOL/L — SIGNIFICANT CHANGE UP (ref 3.5–5.3)
POTASSIUM SERPL-MCNC: SIGNIFICANT CHANGE UP MMOL/L (ref 3.5–5.3)
POTASSIUM SERPL-SCNC: 4.1 MMOL/L — SIGNIFICANT CHANGE UP (ref 3.5–5.3)
POTASSIUM SERPL-SCNC: SIGNIFICANT CHANGE UP MMOL/L (ref 3.5–5.3)
PROT SERPL-MCNC: 6.1 G/DL — SIGNIFICANT CHANGE UP (ref 6–8.3)
PROT SERPL-MCNC: 7.5 G/DL — SIGNIFICANT CHANGE UP (ref 6–8.3)
PROTHROM AB SERPL-ACNC: 22.1 SEC — HIGH (ref 9.5–13)
RBC # BLD: 5.16 M/UL — SIGNIFICANT CHANGE UP (ref 4.2–5.8)
RBC # FLD: 31.5 % — HIGH (ref 10.3–14.5)
RBC BLD AUTO: ABNORMAL
SCHISTOCYTES BLD QL AUTO: SLIGHT — SIGNIFICANT CHANGE UP
SODIUM SERPL-SCNC: 135 MMOL/L — SIGNIFICANT CHANGE UP (ref 135–145)
SODIUM SERPL-SCNC: 137 MMOL/L — SIGNIFICANT CHANGE UP (ref 135–145)
TARGETS BLD QL SMEAR: SIGNIFICANT CHANGE UP
TROPONIN T, HIGH SENSITIVITY RESULT: 18 NG/L — SIGNIFICANT CHANGE UP (ref 0–51)
WBC # BLD: 10.02 K/UL — SIGNIFICANT CHANGE UP (ref 3.8–10.5)
WBC # FLD AUTO: 10.02 K/UL — SIGNIFICANT CHANGE UP (ref 3.8–10.5)

## 2024-08-14 PROCEDURE — 93325 DOPPLER ECHO COLOR FLOW MAPG: CPT | Mod: 26

## 2024-08-14 PROCEDURE — 93308 TTE F-UP OR LMTD: CPT | Mod: 26

## 2024-08-14 PROCEDURE — 99223 1ST HOSP IP/OBS HIGH 75: CPT | Mod: GC,AI

## 2024-08-14 PROCEDURE — 71045 X-RAY EXAM CHEST 1 VIEW: CPT | Mod: 26

## 2024-08-14 PROCEDURE — 71275 CT ANGIOGRAPHY CHEST: CPT | Mod: 26,MC

## 2024-08-14 PROCEDURE — 99285 EMERGENCY DEPT VISIT HI MDM: CPT | Mod: FS

## 2024-08-14 RX ORDER — FUROSEMIDE 40 MG
20 TABLET ORAL ONCE
Refills: 0 | Status: COMPLETED | OUTPATIENT
Start: 2024-08-14 | End: 2024-08-14

## 2024-08-14 RX ORDER — LUSPATERCEPT 25 MG/1
0 INJECTION, POWDER, LYOPHILIZED, FOR SOLUTION SUBCUTANEOUS
Refills: 0 | DISCHARGE

## 2024-08-14 RX ORDER — ONDANSETRON 2 MG/ML
4 INJECTION, SOLUTION INTRAMUSCULAR; INTRAVENOUS EVERY 8 HOURS
Refills: 0 | Status: DISCONTINUED | OUTPATIENT
Start: 2024-08-14 | End: 2024-08-19

## 2024-08-14 RX ORDER — ACETAMINOPHEN 325 MG/1
650 TABLET ORAL EVERY 6 HOURS
Refills: 0 | Status: DISCONTINUED | OUTPATIENT
Start: 2024-08-14 | End: 2024-08-19

## 2024-08-14 RX ORDER — APIXABAN 5 MG/1
5 TABLET, FILM COATED ORAL EVERY 12 HOURS
Refills: 0 | Status: DISCONTINUED | OUTPATIENT
Start: 2024-08-14 | End: 2024-08-16

## 2024-08-14 RX ORDER — FUROSEMIDE 40 MG
20 TABLET ORAL
Refills: 0 | Status: DISCONTINUED | OUTPATIENT
Start: 2024-08-14 | End: 2024-08-16

## 2024-08-14 RX ORDER — IMATINIB MESYLATE 100 MG/1
400 TABLET, FILM COATED ORAL DAILY
Refills: 0 | Status: DISCONTINUED | OUTPATIENT
Start: 2024-08-14 | End: 2024-08-19

## 2024-08-14 RX ORDER — LATANOPROST 50 UG/ML
1 SOLUTION OPHTHALMIC AT BEDTIME
Refills: 0 | Status: DISCONTINUED | OUTPATIENT
Start: 2024-08-14 | End: 2024-08-19

## 2024-08-14 RX ORDER — DORZOLAMIDE HCL/TIMOLOL MALEAT 22.3-6.8/1
1 DROPS OPHTHALMIC (EYE)
Refills: 0 | Status: DISCONTINUED | OUTPATIENT
Start: 2024-08-14 | End: 2024-08-19

## 2024-08-14 RX ORDER — ANAGRELIDE 0.5 MG/1
0.5 CAPSULE ORAL
Refills: 0 | Status: DISCONTINUED | OUTPATIENT
Start: 2024-08-14 | End: 2024-08-19

## 2024-08-14 RX ORDER — FUROSEMIDE 40 MG
20 TABLET ORAL ONCE
Refills: 0 | Status: DISCONTINUED | OUTPATIENT
Start: 2024-08-14 | End: 2024-08-14

## 2024-08-14 RX ORDER — MAGNESIUM, ALUMINUM HYDROXIDE 200-225/5
30 SUSPENSION, ORAL (FINAL DOSE FORM) ORAL EVERY 4 HOURS
Refills: 0 | Status: DISCONTINUED | OUTPATIENT
Start: 2024-08-14 | End: 2024-08-19

## 2024-08-14 RX ORDER — ANAGRELIDE 0.5 MG/1
1 CAPSULE ORAL
Refills: 0 | DISCHARGE

## 2024-08-14 RX ORDER — MOMELOTINIB 100 MG/1
1 TABLET ORAL
Refills: 0 | DISCHARGE

## 2024-08-14 RX ADMIN — Medication 1 DROP(S): at 18:49

## 2024-08-14 RX ADMIN — APIXABAN 5 MILLIGRAM(S): 5 TABLET, FILM COATED ORAL at 18:49

## 2024-08-14 RX ADMIN — Medication 25 GRAM(S): at 09:14

## 2024-08-14 RX ADMIN — ANAGRELIDE 0.5 MILLIGRAM(S): 0.5 CAPSULE ORAL at 18:49

## 2024-08-14 RX ADMIN — Medication 20 MILLIGRAM(S): at 11:21

## 2024-08-14 RX ADMIN — IMATINIB MESYLATE 400 MILLIGRAM(S): 100 TABLET, FILM COATED ORAL at 18:48

## 2024-08-14 RX ADMIN — LATANOPROST 1 DROP(S): 50 SOLUTION OPHTHALMIC at 22:00

## 2024-08-14 NOTE — H&P ADULT - PROBLEM SELECTOR PLAN 1
Pt with jimenez, orthopnea, b/l LE pitting edema, elevated BNP  h/o treatment with immunotherapy with possible cardiotoxicity  CXR (8/14): Mild basal congestion with trace effusions left basilar atelectasis..    Plan:  - f/u TTE  - IV lasix 20mg BID  - f/u lipid panel  - cardiology consult  - monitor lytes  - strict I/Os  - standing weights  - tele  - fluid restriction 1L

## 2024-08-14 NOTE — H&P ADULT - NSICDXPASTMEDICALHX_GEN_ALL_CORE_FT
PAST MEDICAL HISTORY:  Formication     Glaucoma     H/o Lyme disease     MDS/MPN (myelodysplastic/myeloproliferative neoplasms)     Type 2 diabetes mellitus     Venous insufficiency

## 2024-08-14 NOTE — H&P ADULT - PROBLEM SELECTOR PLAN 6
DVT: eliquis 5mg BID  Diet: Diabetic, DASH, 1L restriction  Dispo: pending course, PT eval  Code Status: Full Code   GI Prophylaxis: Mylanta

## 2024-08-14 NOTE — H&P ADULT - NSHPPHYSICALEXAM_GEN_ALL_CORE
GEN: NAD,sitting comfortably  HEENT: atraumatic, normocephalic, PERRLA, EOMi, sclera clear  CV: RRR, normal S1/S2, no murmurs, rubs, or gallops. Hepatojugular reflux noted  PULM: lungs CTAB, no rales, rhonchi, or wheezes, normal respiratory effort  GI: soft, nontender, nondistended, BSx4  MSK: extremities atraumatic, no cyanosis or clubbing, 2+ edema b/l  SKIN: warm, dry, no rashes or lesions  VASC: 2+ peripheral pulses, good capillary refill  NEURO: no focal deficits, sensation grossly intact, RAVI  PSYCH: AAO x3, appropriate mood and affect Vital Signs Last 24 Hrs  T(C): 36.7 (14 Aug 2024 16:10), Max: 36.7 (14 Aug 2024 07:26)  T(F): 98 (14 Aug 2024 16:10), Max: 98 (14 Aug 2024 07:26)  HR: 80 (14 Aug 2024 16:10) (74 - 106)  BP: 145/75 (14 Aug 2024 16:10) (124/85 - 146/104)  BP(mean): --  RR: 15 (14 Aug 2024 16:10) (15 - 22)  SpO2: 97% (14 Aug 2024 16:10) (97% - 99%)    Parameters below as of 14 Aug 2024 16:10  Patient On (Oxygen Delivery Method): room air    GEN: NAD,sitting comfortably  HEENT: atraumatic, normocephalic, PERRLA, EOMi, sclera clear  CV: RRR, normal S1/S2, no murmurs, rubs, or gallops. Hepatojugular reflux noted  PULM: lungs CTAB, no rales, rhonchi, or wheezes, normal respiratory effort  GI: soft, nontender, nondistended, BSx4  MSK: extremities atraumatic, no cyanosis or clubbing, 2+ edema b/l  SKIN: warm, dry, no rashes or lesions  VASC: 2+ peripheral pulses, good capillary refill  NEURO: no focal deficits, sensation grossly intact, RAVI  PSYCH: AAO x3, appropriate mood and affect

## 2024-08-14 NOTE — ED ADULT TRIAGE NOTE - SOURCE OF INFORMATION
24  Naomie Zhou : 1983 Sex: female  Age: 40 y.o.      Assessment and Plan:  Naomie was seen today for anxiety, annual exam, hypothyroidism and sinus problem.    Diagnoses and all orders for this visit:    Encounter for general adult medical examination with abnormal findings    Hypothyroidism due to Hashimoto's thyroiditis  -     levothyroxine (SYNTHROID) 25 MCG tablet; Take 1 tablet by mouth daily    Intracranial hypertension  -     MRI BRAIN WO CONTRAST; Future  -     External Referral To Neurology  -     ondansetron (ZOFRAN-ODT) 4 MG disintegrating tablet; Take 1 tablet by mouth 3 times daily as needed for Nausea or Vomiting  -     acetaZOLAMIDE (DIAMOX) 125 MG tablet; Take 1 tablet by mouth 2 times daily  Try to restart acetazolamide, low dose - pt aware may have repeat problems and dose ideally would be titrated  We are trying to do something in advance of getting her back to neuro   Check MRI, though LP is indicated per recommendations I can see   Zofran PRN   Precautions explicitly reviewed  - to ER if any worsening  Pt has also been asked to keep her eye doctor up to date; states that none of her vision changes are new from what optho already knows      SEAN (generalized anxiety disorder)  Agree above could impact  We need close f/u to reassess     Seasonal allergic rhinitis due to pollen  -     cetirizine (ZYRTEC) 10 MG tablet; Take 1 tablet by mouth daily  Untreated sinus congestion may also contribute to her head fullness  Anti-histamines daily, consider flonase      F/u with gyne when next due     Return for pending results .        Chief Complaint   Patient presents with    Anxiety    Annual Exam    Hypothyroidism    Sinus Problem     2 months        HPI  Pt here for routine follow-up and annual exam    Pt states she's a \"hot mess\"  Her sinuses, allergies have been a major problem for her  She did take Abx without changes     Pt reports h/o intracranial hypertension  Was dx in [facility] 
Patient

## 2024-08-14 NOTE — ED PROVIDER NOTE - PHYSICAL EXAMINATION
A&Ox3, NAD  NCAT  Lungs CTAB. No w/r/r  Cardiac  RRR  Abd soft, NT/ND, no rebound or guarding.   Extremities: cap refill <2, pulses in distal extremities 4+, 2+ pitting pedal edema BL  Skin without rash.   No focal Deficits

## 2024-08-14 NOTE — ED PROVIDER NOTE - CROS ED ROS STATEMENT
Pt informed of lab results and importance of keeping her surgery appointment today. Pt relayed understanding. all other ROS negative except as per HPI

## 2024-08-14 NOTE — H&P ADULT - HISTORY OF PRESENT ILLNESS
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 Noticed he was having progressively worsening chest pressure, exertional dyspnea, orthopnea, and increased b/l LE edema. 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     Denies headache, cough, abd pain, f/c, n/v, d/c, dysuria.    ED Course: Received IV lasix 20mg, cardiology consulted.     Recent admission in May 2024 to [facility] for [place] w/ cough. Found to have PE, started on heparin gtt, transitioned to eliquis. EKG with diffuse TWI, trops downtrending. HBW620. TTE EF 49%, G1DD, no RHS.

## 2024-08-14 NOTE — ED ADULT NURSE NOTE - OBJECTIVE STATEMENT
75 y/o M with pmh of PE 2 months ago, currently on eliquis, presents to the ED with complaints of SOB and chest pressure. Pt states that he had been having difficulty sleeping, and has to sleep sitting in the chair. Pt also endorses SOB on exertion, denies hx of CHF, Chest pain, F/C/N/V. Pt A&Ox3 gross neuro intact, no difficulty speaking in complete sentences, pulses x 4, mendosa x4, abdomen soft nontender nondistended, pitting edema in B/L LE.

## 2024-08-14 NOTE — H&P ADULT - ATTENDING COMMENTS
74M with PMHx of MDS/MPN/CML, prior PE and glaucoma presenting for dyspnea, orthopnea, decrease exercise tolerance and bilateral lower extremity swelling. Patient states symptoms started several days prior and have worsened. He has been non-compliant with his oncology medications and only taking Eliquis once a day due to a misconception. He was last here in May 2024 for dyspnea and diagnosed with PE. At that time was going to undergo an ischemic work up due to diffuse TWI in precordial leads, though after PE was found this was deferred. TTE at that time unremarkable.    VSS  Bilateral edema from feet to knees    Labs personally reviewed:    proBNP elevated    EKG personally reviewed by me: TWI V2-V6  CTA Chest with pulmonary edema and left PLEFF    A/P:  - Patient with hypervolemia and may be due to heart failure given history. Without PE on CTA chest despite improperly taking Eliquis  - TTE, IV Lasix, I&Os, and Daily Weights. Given high pre-test probability of cardiac pathology will consult cardiology early  - Monitor on telemetry  - House heme/onc consulted given poor compliance and need to maintain continuity  - Continue with Eliquis 5 mg BID (counseled patient on proper usage)

## 2024-08-14 NOTE — H&P ADULT - ASSESSMENT
74M PMH T2DM, MDS/MPN/CML (formerly on luspatercept, jakafi, anagrelide, ASA, imatinib), PE on Eliquis (05/2024), chronic venous insufficiency, lyme disease, glaucoma presented to ED initially for L sided chest pain, sob, orthopnea. BNP elevated to 7210. EKG with diffuse TWIs. Pt admitted for heart failure likely 2/2 immunotherapy vs underlying ischemia.

## 2024-08-14 NOTE — H&P ADULT - NSHPLABSRESULTS_GEN_ALL_CORE
LABS:                          13.3   10.02 )-----------( 691      ( 14 Aug 2024 07:25 )             44.8     08-14    137  |  106  |  20  ----------------------------<  142<H>  4.1   |  20<L>  |  1.09    Ca    8.6      14 Aug 2024 09:25  Mg     2.1     08-14    TPro  6.1  /  Alb  3.3  /  TBili  0.8  /  DBili  x   /  AST  39  /  ALT  59<H>  /  AlkPhos  81  08-14    PT/INR - ( 14 Aug 2024 07:25 )   PT: 22.1 sec;   INR: 2.15 ratio         PTT - ( 14 Aug 2024 07:25 )  PTT:42.5 sec    Troponin T, High Sensitivity Result: 20: *     Pro-Brain Natriuretic Peptide (08.14.24 @ 07:25)   Pro-Brain Natriuretic Peptide: 7210 pg/mL    RADIOLOGY< from: Xray Chest 1 View- PORTABLE-Urgent (Xray Chest 1 View- PORTABLE-Urgent .) (08.14.24 @ 08:25) >      IMPRESSION:  Mild basal congestion with trace effusions left basilar atelectasis..    < end of copied text >    < from: CT Angio Chest PE Protocol w/ IV Cont (08.14.24 @ 08:55) >    IMPRESSION:    No pulmonary embolus through the segmental branches.    Mildinterstitial edema and small pleural effusions.    < end of copied text >

## 2024-08-14 NOTE — ED PROVIDER NOTE - ATTENDING APP SHARED VISIT CONTRIBUTION OF CARE
Attending MD Snyder:   I personally have seen and examined this patient.  Physician assistant note reviewed and agree on plan of care and except where noted.  See below for details.     Seen in [id number]  PMD Dr. 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  Reports for last 5 days has had gradually worsening L sided pressure like chest pain, associated with shortness of breath, worse with laying supine, increased LE edema.  Denies abdominal pain, nausea, vomiting, diarrhea, bloody or black stools. Denies dysuria, hematuria.  Denies fevers, chills.      Exam:   General: NAD  HENT: head NCAT, airway patent  Eyes: anicteric, no conjunctival injection   Lungs: lungs scattered crackles, with good inspiratory effort, no wheezing, no rhonchi  Cardiac: +S1S2, no obvious m/r/g, +2 pitting edema of LEs  GI: abdomen soft with +BS, NT, ND  MSK: ranging neck and extremities freely  Neuro: moving all extremities spontaneously, nonfocal  Psych: normal mood and affect    A/P: 74M with chest pain, shortness of breath with laying supine, will obtain labs including trop, BNP to eval for HF, ACS, will obtain

## 2024-08-14 NOTE — H&P ADULT - PROBLEM SELECTOR PLAN 2
Pt follows with Dr. Beyer outpatient  Previously on Jakafi, Luspatercept. Latest outpt regimen includes Anagrelide, Ojjaara, Imatinib,  Evidence of TLS in past, supposed to start Allopurinol    Plan:  - hematology consulted, appreciate recs  - c/w anagrelide 0.5mg pO BID  - c/w Imatinib 400mg pO qd  - f/u TLS labs  - hold meds  -- Jakafi, Luspatercept - 1.75mg/kg q3 weeks, Ojjaara - 200mg daily  -- ASA - hold while on eliquis  -- consider spleen US Pt follows with Dr. Beyer outpatient  Previously on Jakafi, Luspatercept. Latest outpt regimen includes Anagrelide, Ojjaara, Imatinib,  Evidence of TLS in past, supposed to start Allopurinol    Plan:  - hematology consulted, appreciate recs  - c/w anagrelide 0.5mg pO BID  - c/w Imatinib 400mg pO qd  - c/w Ojjaara 200mg qd, will need to bring from home  - f/u TLS labs  - hold meds  -- Jakafi, Luspatercept - 1.75mg/kg q3 weeks,   -- ASA - hold while on eliquis  -- consider spleen US

## 2024-08-14 NOTE — H&P ADULT - PROBLEM SELECTOR PLAN 3
CTPE (05/2024): Pt with Acute bilateral pulmonary emboli within the subsegmental arteries of all lobes. No evidence of right heart strain or pulmonary infarct.  CTPE (08/14): No pulmonary embolus through the segmental branches. Mildinterstitial edema and small pleural effusions.  Was on eliquis starter pack, pt was taking 5mg qd, noticed having oral bleeding. Unclear how compliant patient was with eliquis    Plan:  - c/w Eliquis 5mg BID

## 2024-08-14 NOTE — H&P ADULT - NSHPREVIEWOFSYSTEMS_GEN_ALL_CORE
REVIEW OF SYSTEMS:  CONSTITUTIONAL: No fevers or chills  EYES/ENT: No visual changes;  No vertigo or throat pain   RESPIRATORY: (+) shortness of breath. No cough, wheezing, hemoptysis;   CARDIOVASCULAR: (+) chest pressure. (+) orthopnea, No palpitations  GASTROINTESTINAL: No abdominal or epigastric pain. No nausea, vomiting, or hematemesis; No diarrhea or constipation. No melena or hematochezia.  GENITOURINARY: No dysuria, frequency or hematuria  NEUROLOGICAL: No syncope or dizziness  SKIN: No itching, rashes  EXTREMITIES: (+) b/l leg swelling

## 2024-08-15 LAB
A1C WITH ESTIMATED AVERAGE GLUCOSE RESULT: 6.1 % — HIGH (ref 4–5.6)
ALBUMIN SERPL ELPH-MCNC: 3.5 G/DL — SIGNIFICANT CHANGE UP (ref 3.3–5)
ALP SERPL-CCNC: 94 U/L — SIGNIFICANT CHANGE UP (ref 40–120)
ALT FLD-CCNC: 54 U/L — HIGH (ref 10–45)
ANION GAP SERPL CALC-SCNC: 16 MMOL/L — SIGNIFICANT CHANGE UP (ref 5–17)
AST SERPL-CCNC: 33 U/L — SIGNIFICANT CHANGE UP (ref 10–40)
BASOPHILS # BLD AUTO: 0.35 K/UL — HIGH (ref 0–0.2)
BASOPHILS NFR BLD AUTO: 3.8 % — HIGH (ref 0–2)
BILIRUB SERPL-MCNC: 0.9 MG/DL — SIGNIFICANT CHANGE UP (ref 0.2–1.2)
BUN SERPL-MCNC: 24 MG/DL — HIGH (ref 7–23)
CALCIUM SERPL-MCNC: 9.3 MG/DL — SIGNIFICANT CHANGE UP (ref 8.4–10.5)
CHLORIDE SERPL-SCNC: 107 MMOL/L — SIGNIFICANT CHANGE UP (ref 96–108)
CHOLEST SERPL-MCNC: 126 MG/DL — SIGNIFICANT CHANGE UP
CO2 SERPL-SCNC: 20 MMOL/L — LOW (ref 22–31)
CREAT SERPL-MCNC: 1.23 MG/DL — SIGNIFICANT CHANGE UP (ref 0.5–1.3)
EGFR: 61 ML/MIN/1.73M2 — SIGNIFICANT CHANGE UP
EOSINOPHIL # BLD AUTO: 0.24 K/UL — SIGNIFICANT CHANGE UP (ref 0–0.5)
EOSINOPHIL NFR BLD AUTO: 2.6 % — SIGNIFICANT CHANGE UP (ref 0–6)
ESTIMATED AVERAGE GLUCOSE: 128 MG/DL — HIGH (ref 68–114)
GLUCOSE SERPL-MCNC: 162 MG/DL — HIGH (ref 70–99)
HAPTOGLOB SERPL-MCNC: 68 MG/DL — SIGNIFICANT CHANGE UP (ref 34–200)
HCT VFR BLD CALC: 42.9 % — SIGNIFICANT CHANGE UP (ref 39–50)
HDLC SERPL-MCNC: 31 MG/DL — LOW
HGB BLD-MCNC: 13 G/DL — SIGNIFICANT CHANGE UP (ref 13–17)
IMM GRANULOCYTES NFR BLD AUTO: 3.6 % — HIGH (ref 0–0.9)
LDH SERPL L TO P-CCNC: 895 U/L — HIGH (ref 50–242)
LIPID PNL WITH DIRECT LDL SERPL: 69 MG/DL — SIGNIFICANT CHANGE UP
LYMPHOCYTES # BLD AUTO: 1.88 K/UL — SIGNIFICANT CHANGE UP (ref 1–3.3)
LYMPHOCYTES # BLD AUTO: 20.5 % — SIGNIFICANT CHANGE UP (ref 13–44)
MAGNESIUM SERPL-MCNC: 2 MG/DL — SIGNIFICANT CHANGE UP (ref 1.6–2.6)
MCHC RBC-ENTMCNC: 25.7 PG — LOW (ref 27–34)
MCHC RBC-ENTMCNC: 30.3 GM/DL — LOW (ref 32–36)
MCV RBC AUTO: 85 FL — SIGNIFICANT CHANGE UP (ref 80–100)
MONOCYTES # BLD AUTO: 0.86 K/UL — SIGNIFICANT CHANGE UP (ref 0–0.9)
MONOCYTES NFR BLD AUTO: 9.4 % — SIGNIFICANT CHANGE UP (ref 2–14)
NEUTROPHILS # BLD AUTO: 5.49 K/UL — SIGNIFICANT CHANGE UP (ref 1.8–7.4)
NEUTROPHILS NFR BLD AUTO: 60.1 % — SIGNIFICANT CHANGE UP (ref 43–77)
NON HDL CHOLESTEROL: 94 MG/DL — SIGNIFICANT CHANGE UP
NRBC # BLD: 22 /100 WBCS — HIGH (ref 0–0)
PHOSPHATE SERPL-MCNC: 3.8 MG/DL — SIGNIFICANT CHANGE UP (ref 2.5–4.5)
PLATELET # BLD AUTO: 594 K/UL — HIGH (ref 150–400)
POTASSIUM SERPL-MCNC: 3.8 MMOL/L — SIGNIFICANT CHANGE UP (ref 3.5–5.3)
POTASSIUM SERPL-SCNC: 3.8 MMOL/L — SIGNIFICANT CHANGE UP (ref 3.5–5.3)
PROT SERPL-MCNC: 6.4 G/DL — SIGNIFICANT CHANGE UP (ref 6–8.3)
RBC # BLD: 5.05 M/UL — SIGNIFICANT CHANGE UP (ref 4.2–5.8)
RBC # FLD: 31.2 % — HIGH (ref 10.3–14.5)
SODIUM SERPL-SCNC: 143 MMOL/L — SIGNIFICANT CHANGE UP (ref 135–145)
TRIGL SERPL-MCNC: 147 MG/DL — SIGNIFICANT CHANGE UP
TROPONIN T, HIGH SENSITIVITY RESULT: 21 NG/L — SIGNIFICANT CHANGE UP (ref 0–51)
TSH SERPL-MCNC: 1.46 UIU/ML — SIGNIFICANT CHANGE UP (ref 0.27–4.2)
URATE SERPL-MCNC: 10.6 MG/DL — HIGH (ref 3.4–8.8)
WBC # BLD: 9.15 K/UL — SIGNIFICANT CHANGE UP (ref 3.8–10.5)
WBC # FLD AUTO: 9.15 K/UL — SIGNIFICANT CHANGE UP (ref 3.8–10.5)

## 2024-08-15 PROCEDURE — 99223 1ST HOSP IP/OBS HIGH 75: CPT

## 2024-08-15 PROCEDURE — 99233 SBSQ HOSP IP/OBS HIGH 50: CPT | Mod: GC

## 2024-08-15 RX ORDER — EMPAGLIFLOZIN 10 MG/1
1 TABLET, FILM COATED ORAL
Qty: 30 | Refills: 0
Start: 2024-08-15 | End: 2024-09-13

## 2024-08-15 RX ORDER — CANAGLIFLOZIN 100 MG/1
1 TABLET, FILM COATED ORAL
Qty: 30 | Refills: 0
Start: 2024-08-15 | End: 2024-09-13

## 2024-08-15 RX ORDER — SACUBITRIL AND VALSARTAN 49; 51 MG/1; MG/1
1 TABLET, FILM COATED ORAL
Qty: 30 | Refills: 0
Start: 2024-08-15 | End: 2024-09-13

## 2024-08-15 RX ORDER — DAPAGLIFLOZIN 10 MG/1
1 TABLET, FILM COATED ORAL
Qty: 30 | Refills: 0
Start: 2024-08-15 | End: 2024-09-13

## 2024-08-15 RX ORDER — ERTUGLIFLOZIN 15 MG/1
1 TABLET, FILM COATED ORAL
Qty: 30 | Refills: 0
Start: 2024-08-15 | End: 2024-09-13

## 2024-08-15 RX ADMIN — ANAGRELIDE 0.5 MILLIGRAM(S): 0.5 CAPSULE ORAL at 17:57

## 2024-08-15 RX ADMIN — Medication 20 MILLIGRAM(S): at 13:20

## 2024-08-15 RX ADMIN — Medication 20 MILLIGRAM(S): at 05:19

## 2024-08-15 RX ADMIN — IMATINIB MESYLATE 400 MILLIGRAM(S): 100 TABLET, FILM COATED ORAL at 13:21

## 2024-08-15 RX ADMIN — LATANOPROST 1 DROP(S): 50 SOLUTION OPHTHALMIC at 22:08

## 2024-08-15 RX ADMIN — APIXABAN 5 MILLIGRAM(S): 5 TABLET, FILM COATED ORAL at 17:57

## 2024-08-15 RX ADMIN — Medication 1 DROP(S): at 05:19

## 2024-08-15 RX ADMIN — ANAGRELIDE 0.5 MILLIGRAM(S): 0.5 CAPSULE ORAL at 05:19

## 2024-08-15 RX ADMIN — APIXABAN 5 MILLIGRAM(S): 5 TABLET, FILM COATED ORAL at 05:19

## 2024-08-15 NOTE — PROGRESS NOTE ADULT - PROBLEM SELECTOR PLAN 2
Pt follows with Dr. Beyer outpatient  Previously on Jakafi, Luspatercept. Latest outpt regimen includes Anagrelide, Ojjaara, Imatinib,  Evidence of TLS in past, supposed to start Allopurinol    Plan:  - hematology consulted, appreciate recs  - c/w anagrelide 0.5mg pO BID  - c/w Imatinib 400mg pO qd  - c/w Ojjaara 200mg qd, will need to bring from home  - f/u TLS labs  - hold meds  -- Jakafi, Luspatercept - 1.75mg/kg q3 weeks,   -- ASA - hold while on eliquis  -- consider spleen US Pt follows with Dr. Beyer outpatient  Previously on Jakafi, Luspatercept. Latest outpt regimen includes Anagrelide, Ojjaara, Imatinib,  Evidence of TLS in past, supposed to start Allopurinol  , Uric Acid 10.6    Plan:  - hematology consulted, appreciate recs  - c/w anagrelide 0.5mg pO BID  - c/w Imatinib 400mg pO qd  - c/w Ojjaara 200mg qd, will need to bring from home  - f/u G6PD  - hold meds  -- Jakafi, Luspatercept - 1.75mg/kg q3 weeks,   -- ASA - hold while on eliquis  -- consider spleen US

## 2024-08-15 NOTE — CONSULT NOTE ADULT - SUBJECTIVE AND OBJECTIVE BOX
74M PMH T2DM, MDS/MPN/CML (formerly on luspatercept, jakafi, anagrelide, ASA, imatinib), PE on Eliquis (05/2024), chronic venous insufficiency, lyme disease, glaucoma presented to ED initially for L sided chest pain and sob. Noticed he was having progressively worsening chest pressure, exertional dyspnea, orthopnea, and increased b/l LE edema. 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     Pt was started on IV lasix 20 mg BID and fluid restriction. TTE done 8/14 showed EF 20-25% from TTE in May showing EF 49%.    Current Medications:   Anagrelide  Momelotinib  Imatinib  Eliquis 5 mg BID      Pmhx:   MDS/MPN/CML   PE 5/2024  Chronic Venous Insufficiency   Lyme disease  Glaucoma      MEDICATIONS  (STANDING):  anagrelide 0.5 milliGRAM(s) Oral <User Schedule>  apixaban 5 milliGRAM(s) Oral every 12 hours  dorzolamide 2%/timolol 0.5% Ophthalmic Solution 1 Drop(s) Both EYES two times a day  furosemide   Injectable 20 milliGRAM(s) IV Push two times a day  imatinib 400 milliGRAM(s) Oral daily  latanoprost 0.005% Ophthalmic Solution 1 Drop(s) Both EYES at bedtime    MEDICATIONS  (PRN):  acetaminophen     Tablet .. 650 milliGRAM(s) Oral every 6 hours PRN Temp greater or equal to 38C (100.4F), Mild Pain (1 - 3)  aluminum hydroxide/magnesium hydroxide/simethicone Suspension 30 milliLiter(s) Oral every 4 hours PRN Dyspepsia  melatonin 3 milliGRAM(s) Oral at bedtime PRN Insomnia  ondansetron Injectable 4 milliGRAM(s) IV Push every 8 hours PRN Nausea and/or Vomiting          PHYSICAL EXAM:  Vital Signs Last 24 Hrs  T(C): 36.7 (15 Aug 2024 10:59), Max: 36.8 (15 Aug 2024 00:36)  T(F): 98.1 (15 Aug 2024 10:59), Max: 98.3 (15 Aug 2024 00:36)  HR: 67 (15 Aug 2024 10:59) (67 - 97)  BP: 111/63 (15 Aug 2024 10:59) (111/63 - 159/85)  BP(mean): --  RR: 18 (15 Aug 2024 10:59) (15 - 18)  SpO2: 100% (15 Aug 2024 10:59) (95% - 100%)    Parameters below as of 15 Aug 2024 10:59  Patient On (Oxygen Delivery Method): room air        I&O's Summary    15 Aug 2024 07:01  -  15 Aug 2024 11:52  --------------------------------------------------------  IN: 120 mL / OUT: 1100 mL / NET: -980 mL          General: NAD, non-toxic appearing   CV: RRR, normal S1 and S2, no m/r/g  Lungs: normal respiratory effort. CTAB, no wheezes, rales, or rhonchi  Abd: soft, nontender, nondistended  Ext:2+ Peripheral edema. Pulses bilateally   Pysch: AOx4, appropriate affect   Neuro:  moving all extremities spontaneously     LABS:  CAPILLARY BLOOD GLUCOSE                                  13.0   9.15  )-----------( 594      ( 15 Aug 2024 06:21 )             42.9       WBC Trend: 9.15<--, 10.02<--, 8.28<--  Hb Trend: 13.0<--, 13.3<--, 10.9<--    08-15    143  |  107  |  24<H>  ----------------------------<  162<H>  3.8   |  20<L>  |  1.23    Ca    9.3      15 Aug 2024 06:21  Phos  3.8     08-15  Mg     2.0     08-15    TPro  6.4  /  Alb  3.5  /  TBili  0.9  /  DBili  x   /  AST  33  /  ALT  54<H>  /  AlkPhos  94  08-15    PT/INR - ( 14 Aug 2024 07:25 )   PT: 22.1 sec;   INR: 2.15 ratio         PTT - ( 14 Aug 2024 07:25 )  PTT:42.5 sec      Urinalysis Basic - ( 15 Aug 2024 06:21 )    Color: x / Appearance: x / SG: x / pH: x  Gluc: 162 mg/dL / Ketone: x  / Bili: x / Urobili: x   Blood: x / Protein: x / Nitrite: x   Leuk Esterase: x / RBC: x / WBC x   Sq Epi: x / Non Sq Epi: x / Bacteria: x            RADIOLOGY & ADDITIONAL TESTS: Reviewed   74M PMH T2DM, MDS/MPN/CML (formerly on luspatercept, jakafi, anagrelide, ASA, imatinib), PE on Eliquis (05/2024), chronic venous insufficiency, lyme disease, glaucoma presented to ED initially for L sided chest pain and sob. Noticed he was having progressively worsening chest pressure, exertional dyspnea, orthopnea, and increased b/l LE edema. 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     Pt was started on IV lasix 20 mg BID and fluid restriction. TTE done 8/14 showed EF 20-25% from TTE in May showing EF 49%.     Patient examined at bedside. Feeling comfortable with some lower extremity edema. no SOB, Fever, difficulty laying down.   Current Medications:   Anagrelide  Momelotinib  Imatinib  Eliquis 5 mg BID      Pmhx:   MDS/MPN/CML   PE 5/2024  Chronic Venous Insufficiency   Lyme disease  Glaucoma      PHYSICAL EXAM:  Vital Signs Last 24 Hrs  T(C): 36.7 (15 Aug 2024 10:59), Max: 36.8 (15 Aug 2024 00:36)  T(F): 98.1 (15 Aug 2024 10:59), Max: 98.3 (15 Aug 2024 00:36)  HR: 67 (15 Aug 2024 10:59) (67 - 97)  BP: 111/63 (15 Aug 2024 10:59) (111/63 - 159/85)  BP(mean): --  RR: 18 (15 Aug 2024 10:59) (15 - 18)  SpO2: 100% (15 Aug 2024 10:59) (95% - 100%)    Parameters below as of 15 Aug 2024 10:59  Patient On (Oxygen Delivery Method): room air        I&O's Summary    15 Aug 2024 07:01  -  15 Aug 2024 11:52  --------------------------------------------------------  IN: 120 mL / OUT: 1100 mL / NET: -980 mL          General: NAD, non-toxic appearing   CV: RRR, normal S1 and S2, no m/r/g  Lungs: normal respiratory effort. CTAB, no wheezes, rales, or rhonchi  Abd: soft, nontender, nondistended  Ext:2+ Peripheral edema. Pulses bilateally   Pysch: AOx4, appropriate affect   Neuro:  moving all extremities spontaneously     LABS:  CAPILLARY BLOOD GLUCOSE                                  13.0   9.15  )-----------( 594      ( 15 Aug 2024 06:21 )             42.9       WBC Trend: 9.15<--, 10.02<--, 8.28<--  Hb Trend: 13.0<--, 13.3<--, 10.9<--    08-15    143  |  107  |  24<H>  ----------------------------<  162<H>  3.8   |  20<L>  |  1.23    Ca    9.3      15 Aug 2024 06:21  Phos  3.8     08-15  Mg     2.0     08-15    TPro  6.4  /  Alb  3.5  /  TBili  0.9  /  DBili  x   /  AST  33  /  ALT  54<H>  /  AlkPhos  94  08-15    PT/INR - ( 14 Aug 2024 07:25 )   PT: 22.1 sec;   INR: 2.15 ratio         PTT - ( 14 Aug 2024 07:25 )  PTT:42.5 sec      Urinalysis Basic - ( 15 Aug 2024 06:21 )    Color: x / Appearance: x / SG: x / pH: x  Gluc: 162 mg/dL / Ketone: x  / Bili: x / Urobili: x   Blood: x / Protein: x / Nitrite: x   Leuk Esterase: x / RBC: x / WBC x   Sq Epi: x / Non Sq Epi: x / Bacteria: x            RADIOLOGY & ADDITIONAL TESTS: Reviewed   74M PMH T2DM, MDS/MPN/CML (formerly on luspatercept, jakafi, anagrelide, ASA, imatinib), PE on Eliquis (05/2024), chronic venous insufficiency, lyme disease, glaucoma presented to ED initially for L sided chest pain and sob. Noticed he was having progressively worsening chest pressure, exertional dyspnea, orthopnea, and increased b/l LE edema. 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     Pt was started on IV lasix 20 mg BID and fluid restriction. TTE done 8/14 showed EF 20-25% from TTE in May showing EF 49%.     Patient examined at bedside. Feeling comfortable with some lower extremity edema. no SOB, Fever, difficulty laying down.   Current Medications:   Anagrelide  Momelotinib  Imatinib  Eliquis 5 mg BID      Pmhx:   MDS/MPN/CML   PE 5/2024  Chronic Venous Insufficiency   Lyme disease  Glaucoma      PHYSICAL EXAM:  Vital Signs Last 24 Hrs  T(C): 36.7 (15 Aug 2024 10:59), Max: 36.8 (15 Aug 2024 00:36)  T(F): 98.1 (15 Aug 2024 10:59), Max: 98.3 (15 Aug 2024 00:36)  HR: 67 (15 Aug 2024 10:59) (67 - 97)  BP: 111/63 (15 Aug 2024 10:59) (111/63 - 159/85)  BP(mean): --  RR: 18 (15 Aug 2024 10:59) (15 - 18)  SpO2: 100% (15 Aug 2024 10:59) (95% - 100%)    Parameters below as of 15 Aug 2024 10:59  Patient On (Oxygen Delivery Method): room air        I&O's Summary    15 Aug 2024 07:01  -  15 Aug 2024 11:52  --------------------------------------------------------  IN: 120 mL / OUT: 1100 mL / NET: -980 mL          General: NAD, non-toxic appearing   CV: RRR, normal S1 and S2, + S3  Lungs: normal respiratory effort. CTAB, no wheezes, rales, or rhonchi  Abd: soft, nontender, nondistended  Ext:2+ Peripheral edema. Pulses bilateally   Pysch: AOx4, appropriate affect   Neuro:  moving all extremities spontaneously     LABS:  CAPILLARY BLOOD GLUCOSE                                  13.0   9.15  )-----------( 594      ( 15 Aug 2024 06:21 )             42.9       WBC Trend: 9.15<--, 10.02<--, 8.28<--  Hb Trend: 13.0<--, 13.3<--, 10.9<--    08-15    143  |  107  |  24<H>  ----------------------------<  162<H>  3.8   |  20<L>  |  1.23    Ca    9.3      15 Aug 2024 06:21  Phos  3.8     08-15  Mg     2.0     08-15    TPro  6.4  /  Alb  3.5  /  TBili  0.9  /  DBili  x   /  AST  33  /  ALT  54<H>  /  AlkPhos  94  08-15    PT/INR - ( 14 Aug 2024 07:25 )   PT: 22.1 sec;   INR: 2.15 ratio         PTT - ( 14 Aug 2024 07:25 )  PTT:42.5 sec      Urinalysis Basic - ( 15 Aug 2024 06:21 )    Color: x / Appearance: x / SG: x / pH: x  Gluc: 162 mg/dL / Ketone: x  / Bili: x / Urobili: x   Blood: x / Protein: x / Nitrite: x   Leuk Esterase: x / RBC: x / WBC x   Sq Epi: x / Non Sq Epi: x / Bacteria: x            RADIOLOGY & ADDITIONAL TESTS: Reviewed

## 2024-08-15 NOTE — PATIENT PROFILE ADULT - FALL HARM RISK - HARM RISK INTERVENTIONS

## 2024-08-15 NOTE — CONSULT NOTE ADULT - ASSESSMENT
74M PMH T2DM, MDS/MPN/CML (formerly on luspatercept, jakafi, anagrelide, ASA, imatinib), PE on Eliquis (05/2024), chronic venous insufficiency, lyme disease, glaucoma consulted for new HFrEF 20-25%.       #HFrEF  - Echo shows EF 20-25%  - C/w IV lasix  - C/w fluid restriction  - Will start GDMT   - consider LHC and R[facility]      All recommendations unofficial pending attending attestation.  74M PMH T2DM, MDS/MPN/CML (formerly on luspatercept, jakafi, anagrelide, ASA, imatinib), PE on Eliquis (05/2024), chronic venous insufficiency, lyme disease, glaucoma consulted for new HFrEF 20-25%.       #HFrEF  - Echo shows EF 20-25%  - C/w IV lasix  - C/w fluid restriction  - Will start GDMT : Spironolactone metorpolol, Entresto, SGLT2  - consider [facility]      All recommendations unofficial pending attending attestation.  74M PMH T2DM, MDS/MPN/CML (formerly on luspatercept, jakafi, anagrelide, ASA, imatinib), PE on Eliquis (05/2024), chronic venous insufficiency, lyme disease, glaucoma consulted for new HFrEF 20-25%.       #HFrEF  - Echo shows EF 20-25%  - C/w IV lasix  - C/w fluid restriction  - Will start GDMT : Spironolactone metorpolol, Entresto, SGLT2  - consider LHC and RHC      All recommendations unofficial pending attending attestation.

## 2024-08-15 NOTE — PROGRESS NOTE ADULT - ATTENDING COMMENTS
Patient seen and examined at bedside. No acute events overnight. Not on telemetry, but should be (seems to be poorly compliant). Improved dyspnea and leg swelling. He has new onset reduced ejection fraction HF. He needs further diuresis, ischemic work up and GDMT optimization. Patient requesting to leave AMA. Will discuss with him further.

## 2024-08-15 NOTE — PROGRESS NOTE ADULT - SUBJECTIVE AND OBJECTIVE BOX
Progress Note    08-14-24 (1d)    Patient is a 74y old  Male who presents with a chief complaint of Shortness of Breath, Chest Pain (14 Aug 2024 14:18)      Subjective / Overnight Events :  - No acute events overnight.  - Pt seen and examined at bedside.     Additional ROS (if any):    MEDICATIONS  (STANDING):  anagrelide 0.5 milliGRAM(s) Oral <User Schedule>  apixaban 5 milliGRAM(s) Oral every 12 hours  dorzolamide 2%/timolol 0.5% Ophthalmic Solution 1 Drop(s) Both EYES two times a day  furosemide   Injectable 20 milliGRAM(s) IV Push two times a day  imatinib 400 milliGRAM(s) Oral daily  latanoprost 0.005% Ophthalmic Solution 1 Drop(s) Both EYES at bedtime    MEDICATIONS  (PRN):  acetaminophen     Tablet .. 650 milliGRAM(s) Oral every 6 hours PRN Temp greater or equal to 38C (100.4F), Mild Pain (1 - 3)  aluminum hydroxide/magnesium hydroxide/simethicone Suspension 30 milliLiter(s) Oral every 4 hours PRN Dyspepsia  melatonin 3 milliGRAM(s) Oral at bedtime PRN Insomnia  ondansetron Injectable 4 milliGRAM(s) IV Push every 8 hours PRN Nausea and/or Vomiting          PHYSICAL EXAM:  Vital Signs Last 24 Hrs  T(C): 36.1 (15 Aug 2024 04:00), Max: 36.8 (15 Aug 2024 00:36)  T(F): 97 (15 Aug 2024 04:00), Max: 98.3 (15 Aug 2024 00:36)  HR: 77 (15 Aug 2024 04:00) (74 - 97)  BP: 118/74 (15 Aug 2024 04:00) (118/74 - 159/85)  BP(mean): --  RR: 18 (15 Aug 2024 04:00) (15 - 20)  SpO2: 96% (15 Aug 2024 04:00) (95% - 99%)    Parameters below as of 15 Aug 2024 04:00  Patient On (Oxygen Delivery Method): room air        I&O's Summary      General: NAD  HEENT: PERRLA, EOMi, no scleral icterus  CV: RRR, normal S1 and S2, no m/r/g  Lungs: normal respiratory effort. CTAB, no wheezes, rales, or rhonchi  Abd: soft, nontender, nondistended, BSx4  Ext: no edema, 2+ peripheral pulses   Pysch: AAOx3  Neuro: grossly non-focal, moving all extremities spontaneously   Skin: no rashes or lesions     LABS:  CAPILLARY BLOOD GLUCOSE                                  13.0   9.15  )-----------( 594      ( 15 Aug 2024 06:21 )             42.9       WBC Trend: 9.15<--, 10.02<--, 8.28<--  Hb Trend: 13.0<--, 13.3<--, 10.9<--    08-14    137  |  106  |  20  ----------------------------<  142<H>  4.1   |  20<L>  |  1.09    Ca    8.6      14 Aug 2024 09:25  Mg     2.1     08-14    TPro  6.1  /  Alb  3.3  /  TBili  0.8  /  DBili  x   /  AST  39  /  ALT  59<H>  /  AlkPhos  81  08-14    PT/INR - ( 14 Aug 2024 07:25 )   PT: 22.1 sec;   INR: 2.15 ratio         PTT - ( 14 Aug 2024 07:25 )  PTT:42.5 sec      Urinalysis Basic - ( 14 Aug 2024 09:25 )    Color: x / Appearance: x / SG: x / pH: x  Gluc: 142 mg/dL / Ketone: x  / Bili: x / Urobili: x   Blood: x / Protein: x / Nitrite: x   Leuk Esterase: x / RBC: x / WBC x   Sq Epi: x / Non Sq Epi: x / Bacteria: x            RADIOLOGY & ADDITIONAL TESTS: Reviewed Progress Note    08-14-24 (1d)    Patient is a 74y old  Male who presents with a chief complaint of Shortness of Breath, Chest Pain (14 Aug 2024 14:18)      Subjective / Overnight Events :  - No acute events overnight.  - Pt seen and examined at bedside.   - Pt feeling well, notes that he was able to sleep better  - Denies cp, sob, abd pain, leg pain  - Expresses wishes to go home, discussed risks of going home with EF20%. Pt expressed understanding and would like to stay.     Additional ROS (if any):    MEDICATIONS  (STANDING):  anagrelide 0.5 milliGRAM(s) Oral <User Schedule>  apixaban 5 milliGRAM(s) Oral every 12 hours  dorzolamide 2%/timolol 0.5% Ophthalmic Solution 1 Drop(s) Both EYES two times a day  furosemide   Injectable 20 milliGRAM(s) IV Push two times a day  imatinib 400 milliGRAM(s) Oral daily  latanoprost 0.005% Ophthalmic Solution 1 Drop(s) Both EYES at bedtime    MEDICATIONS  (PRN):  acetaminophen     Tablet .. 650 milliGRAM(s) Oral every 6 hours PRN Temp greater or equal to 38C (100.4F), Mild Pain (1 - 3)  aluminum hydroxide/magnesium hydroxide/simethicone Suspension 30 milliLiter(s) Oral every 4 hours PRN Dyspepsia  melatonin 3 milliGRAM(s) Oral at bedtime PRN Insomnia  ondansetron Injectable 4 milliGRAM(s) IV Push every 8 hours PRN Nausea and/or Vomiting          PHYSICAL EXAM:  Vital Signs Last 24 Hrs  T(C): 36.1 (15 Aug 2024 04:00), Max: 36.8 (15 Aug 2024 00:36)  T(F): 97 (15 Aug 2024 04:00), Max: 98.3 (15 Aug 2024 00:36)  HR: 77 (15 Aug 2024 04:00) (74 - 97)  BP: 118/74 (15 Aug 2024 04:00) (118/74 - 159/85)  BP(mean): --  RR: 18 (15 Aug 2024 04:00) (15 - 20)  SpO2: 96% (15 Aug 2024 04:00) (95% - 99%)    Parameters below as of 15 Aug 2024 04:00  Patient On (Oxygen Delivery Method): room air        I&O's Summary      General: NAD  CV: RRR, normal S1 and S2, no m/r/g  Lungs: normal respiratory effort. CTAB, no wheezes, rales, or rhonchi  Abd: soft, nontender, nondistended, BSx4  Ext: +1 LE edema, 2+ peripheral pulses   Pysch: AAOx3      LABS:  CAPILLARY BLOOD GLUCOSE                                  13.0   9.15  )-----------( 594      ( 15 Aug 2024 06:21 )             42.9       WBC Trend: 9.15<--, 10.02<--, 8.28<--  Hb Trend: 13.0<--, 13.3<--, 10.9<--    08-14    137  |  106  |  20  ----------------------------<  142<H>  4.1   |  20<L>  |  1.09    Ca    8.6      14 Aug 2024 09:25  Mg     2.1     08-14    TPro  6.1  /  Alb  3.3  /  TBili  0.8  /  DBili  x   /  AST  39  /  ALT  59<H>  /  AlkPhos  81  08-14    PT/INR - ( 14 Aug 2024 07:25 )   PT: 22.1 sec;   INR: 2.15 ratio         PTT - ( 14 Aug 2024 07:25 )  PTT:42.5 sec      Urinalysis Basic - ( 14 Aug 2024 09:25 )    Color: x / Appearance: x / SG: x / pH: x  Gluc: 142 mg/dL / Ketone: x  / Bili: x / Urobili: x   Blood: x / Protein: x / Nitrite: x   Leuk Esterase: x / RBC: x / WBC x   Sq Epi: x / Non Sq Epi: x / Bacteria: x            RADIOLOGY & ADDITIONAL TESTS: Reviewed

## 2024-08-15 NOTE — PROGRESS NOTE ADULT - PROBLEM SELECTOR PLAN 1
Pt with jimenez, orthopnea, b/l LE pitting edema, elevated BNP  h/o treatment with immunotherapy with possible cardiotoxicity  CXR (8/14): Mild basal congestion with trace effusions left basilar atelectasis..  TTE (8/14): EF20-25%    Plan:  - IV lasix 20mg BID  - f/u lipid panel  - cardiology consult  - monitor lytes  - strict I/Os  - standing weights  - tele  - fluid restriction 1L Pt with jimenez, orthopnea, b/l LE pitting edema, elevated BNP  h/o treatment with immunotherapy with possible cardiotoxicity  CXR (8/14): Mild basal congestion with trace effusions left basilar atelectasis..  Lipid Panel wnl  TTE (8/14): EF20-25%    Plan:  - IV lasix 20mg BID  - cardiology following, appreciate recs  -- consider starting GDMT pending price check from pharmacy  -- consider [facility]  - monitor lytes  - strict I/Os  - standing weights  - tele  - fluid restriction 1L

## 2024-08-15 NOTE — CONSULT NOTE ADULT - ATTENDING COMMENTS
Kidney and liver function both look stable.  
Patient had appointment scheduled this Friday and had to be cancelled. Rescheduled for 10/29/2021. Patient had labs drawn on 9/10/21. She would like to know how her labs are and what how her \"kidney and liver\" levels look. Could you review labs from 9/10 please?   
Patient informed and expresses understanding.   
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 He presented with acute symptomatic heart failure with reduced ejection fraction. He notes orthopnea, PND, exertional dyspnea and lower extremity edema evolving since May 2024. He reports feeling better now after diuresis and reports not having chest pain.     Treatment regimen includes momelotinib, anagrelide, and imatinib, though with uncertain adherence. He is on apixaban for the recent PE and aspirin due to MDS.    Echo with new significant systolic dysfunction compared to prior, borderline LVH.    I discussed the diagnosis with the patient and recommended we proceed with a right and left heart cath this admission to rule out coronary artery disease. He expressed agreement with this plan.    Momelotinib and anagrelide carry warnings about risk for cardiovascular adverse events, though appears mainly extrapolated from agents with similar MOA.      Recommendations:  1. start Entresto 24/26 BID now  2. would hold the momelotinib for now if no-objection from Heme/Onc  3. hold apixaban for now and use IV heparin in anticipation of cardiac cath  4. can continue aspirin  5. patient would benefit from a statin given history of diabetes  6. monitor rhythm given ectopy on ECG and keep electrolytes replaced    CLARENCE Contreras MD

## 2024-08-16 ENCOUNTER — TRANSCRIPTION ENCOUNTER (OUTPATIENT)
Age: 74
End: 2024-08-16

## 2024-08-16 LAB
ALBUMIN SERPL ELPH-MCNC: 3.3 G/DL — SIGNIFICANT CHANGE UP (ref 3.3–5)
ALP SERPL-CCNC: 75 U/L — SIGNIFICANT CHANGE UP (ref 40–120)
ALT FLD-CCNC: 38 U/L — SIGNIFICANT CHANGE UP (ref 10–45)
ANION GAP SERPL CALC-SCNC: 11 MMOL/L — SIGNIFICANT CHANGE UP (ref 5–17)
APTT BLD: 34.7 SEC — SIGNIFICANT CHANGE UP (ref 24.5–35.6)
AST SERPL-CCNC: 23 U/L — SIGNIFICANT CHANGE UP (ref 10–40)
BASOPHILS # BLD AUTO: 0.24 K/UL — HIGH (ref 0–0.2)
BASOPHILS NFR BLD AUTO: 3.2 % — HIGH (ref 0–2)
BILIRUB SERPL-MCNC: 0.8 MG/DL — SIGNIFICANT CHANGE UP (ref 0.2–1.2)
BUN SERPL-MCNC: 24 MG/DL — HIGH (ref 7–23)
CALCIUM SERPL-MCNC: 8.5 MG/DL — SIGNIFICANT CHANGE UP (ref 8.4–10.5)
CHLORIDE SERPL-SCNC: 107 MMOL/L — SIGNIFICANT CHANGE UP (ref 96–108)
CO2 SERPL-SCNC: 23 MMOL/L — SIGNIFICANT CHANGE UP (ref 22–31)
CREAT SERPL-MCNC: 1.27 MG/DL — SIGNIFICANT CHANGE UP (ref 0.5–1.3)
EGFR: 59 ML/MIN/1.73M2 — LOW
EOSINOPHIL # BLD AUTO: 0.22 K/UL — SIGNIFICANT CHANGE UP (ref 0–0.5)
EOSINOPHIL NFR BLD AUTO: 2.9 % — SIGNIFICANT CHANGE UP (ref 0–6)
GLUCOSE SERPL-MCNC: 134 MG/DL — HIGH (ref 70–99)
HAPTOGLOB SERPL-MCNC: 75 MG/DL — SIGNIFICANT CHANGE UP (ref 34–200)
HCT VFR BLD CALC: 38.1 % — LOW (ref 39–50)
HGB BLD-MCNC: 11.6 G/DL — LOW (ref 13–17)
IMM GRANULOCYTES NFR BLD AUTO: 3 % — HIGH (ref 0–0.9)
INR BLD: 2 RATIO — HIGH (ref 0.85–1.18)
LDH SERPL L TO P-CCNC: 701 U/L — HIGH (ref 50–242)
LYMPHOCYTES # BLD AUTO: 1.44 K/UL — SIGNIFICANT CHANGE UP (ref 1–3.3)
LYMPHOCYTES # BLD AUTO: 19.1 % — SIGNIFICANT CHANGE UP (ref 13–44)
MAGNESIUM SERPL-MCNC: 1.8 MG/DL — SIGNIFICANT CHANGE UP (ref 1.6–2.6)
MCHC RBC-ENTMCNC: 25.7 PG — LOW (ref 27–34)
MCHC RBC-ENTMCNC: 30.4 GM/DL — LOW (ref 32–36)
MCV RBC AUTO: 84.3 FL — SIGNIFICANT CHANGE UP (ref 80–100)
MONOCYTES # BLD AUTO: 0.64 K/UL — SIGNIFICANT CHANGE UP (ref 0–0.9)
MONOCYTES NFR BLD AUTO: 8.5 % — SIGNIFICANT CHANGE UP (ref 2–14)
NEUTROPHILS # BLD AUTO: 4.78 K/UL — SIGNIFICANT CHANGE UP (ref 1.8–7.4)
NEUTROPHILS NFR BLD AUTO: 63.3 % — SIGNIFICANT CHANGE UP (ref 43–77)
NRBC # BLD: 11 /100 WBCS — HIGH (ref 0–0)
PHOSPHATE SERPL-MCNC: 4.1 MG/DL — SIGNIFICANT CHANGE UP (ref 2.5–4.5)
PLATELET # BLD AUTO: 475 K/UL — HIGH (ref 150–400)
POTASSIUM SERPL-MCNC: 3.5 MMOL/L — SIGNIFICANT CHANGE UP (ref 3.5–5.3)
POTASSIUM SERPL-SCNC: 3.5 MMOL/L — SIGNIFICANT CHANGE UP (ref 3.5–5.3)
PROT SERPL-MCNC: 5.7 G/DL — LOW (ref 6–8.3)
PROTHROM AB SERPL-ACNC: 21.6 SEC — HIGH (ref 9.5–13)
RBC # BLD: 4.52 M/UL — SIGNIFICANT CHANGE UP (ref 4.2–5.8)
RBC # FLD: 29.9 % — HIGH (ref 10.3–14.5)
SODIUM SERPL-SCNC: 141 MMOL/L — SIGNIFICANT CHANGE UP (ref 135–145)
URATE SERPL-MCNC: 10.3 MG/DL — HIGH (ref 3.4–8.8)
WBC # BLD: 7.55 K/UL — SIGNIFICANT CHANGE UP (ref 3.8–10.5)
WBC # FLD AUTO: 7.55 K/UL — SIGNIFICANT CHANGE UP (ref 3.8–10.5)

## 2024-08-16 PROCEDURE — 99233 SBSQ HOSP IP/OBS HIGH 50: CPT | Mod: GC

## 2024-08-16 PROCEDURE — 99233 SBSQ HOSP IP/OBS HIGH 50: CPT

## 2024-08-16 RX ORDER — HEPARIN SODIUM,BOVINE 1000/ML
3000 VIAL (ML) INJECTION EVERY 6 HOURS
Refills: 0 | Status: DISCONTINUED | OUTPATIENT
Start: 2024-08-16 | End: 2024-08-16

## 2024-08-16 RX ORDER — ENOXAPARIN SODIUM 100 MG/ML
70 INJECTION SUBCUTANEOUS EVERY 12 HOURS
Refills: 0 | Status: DISCONTINUED | OUTPATIENT
Start: 2024-08-16 | End: 2024-08-17

## 2024-08-16 RX ORDER — HEPARIN SODIUM,BOVINE 1000/ML
6000 VIAL (ML) INJECTION EVERY 6 HOURS
Refills: 0 | Status: DISCONTINUED | OUTPATIENT
Start: 2024-08-16 | End: 2024-08-16

## 2024-08-16 RX ORDER — HEPARIN SODIUM,BOVINE 1000/ML
VIAL (ML) INJECTION
Qty: 25000 | Refills: 0 | Status: DISCONTINUED | OUTPATIENT
Start: 2024-08-16 | End: 2024-08-16

## 2024-08-16 RX ORDER — HEPARIN SODIUM,BOVINE 1000/ML
6000 VIAL (ML) INJECTION ONCE
Refills: 0 | Status: COMPLETED | OUTPATIENT
Start: 2024-08-16 | End: 2024-08-16

## 2024-08-16 RX ORDER — FUROSEMIDE 40 MG
40 TABLET ORAL
Refills: 0 | Status: DISCONTINUED | OUTPATIENT
Start: 2024-08-16 | End: 2024-08-18

## 2024-08-16 RX ORDER — SACUBITRIL AND VALSARTAN 49; 51 MG/1; MG/1
1 TABLET, FILM COATED ORAL
Refills: 0 | Status: DISCONTINUED | OUTPATIENT
Start: 2024-08-16 | End: 2024-08-19

## 2024-08-16 RX ORDER — FUROSEMIDE 40 MG
20 TABLET ORAL ONCE
Refills: 0 | Status: COMPLETED | OUTPATIENT
Start: 2024-08-16 | End: 2024-08-16

## 2024-08-16 RX ORDER — POTASSIUM CHLORIDE 10 MEQ
20 TABLET, EXT RELEASE, PARTICLES/CRYSTALS ORAL
Refills: 0 | Status: COMPLETED | OUTPATIENT
Start: 2024-08-16 | End: 2024-08-16

## 2024-08-16 RX ADMIN — SACUBITRIL AND VALSARTAN 1 TABLET(S): 49; 51 TABLET, FILM COATED ORAL at 17:53

## 2024-08-16 RX ADMIN — Medication 20 MILLIEQUIVALENT(S): at 11:39

## 2024-08-16 RX ADMIN — Medication 40 MILLIGRAM(S): at 20:59

## 2024-08-16 RX ADMIN — Medication 40 MILLIGRAM(S): at 13:27

## 2024-08-16 RX ADMIN — Medication 20 MILLIGRAM(S): at 05:36

## 2024-08-16 RX ADMIN — Medication 1 DROP(S): at 17:53

## 2024-08-16 RX ADMIN — Medication 25 GRAM(S): at 08:56

## 2024-08-16 RX ADMIN — Medication 20 MILLIEQUIVALENT(S): at 08:56

## 2024-08-16 RX ADMIN — Medication 20 MILLIEQUIVALENT(S): at 14:00

## 2024-08-16 RX ADMIN — SACUBITRIL AND VALSARTAN 1 TABLET(S): 49; 51 TABLET, FILM COATED ORAL at 05:59

## 2024-08-16 RX ADMIN — ANAGRELIDE 0.5 MILLIGRAM(S): 0.5 CAPSULE ORAL at 05:36

## 2024-08-16 RX ADMIN — ENOXAPARIN SODIUM 70 MILLIGRAM(S): 100 INJECTION SUBCUTANEOUS at 20:44

## 2024-08-16 RX ADMIN — Medication 6000 UNIT(S): at 11:36

## 2024-08-16 RX ADMIN — LATANOPROST 1 DROP(S): 50 SOLUTION OPHTHALMIC at 21:00

## 2024-08-16 RX ADMIN — Medication 20 MILLIGRAM(S): at 13:28

## 2024-08-16 RX ADMIN — Medication 1 DROP(S): at 05:34

## 2024-08-16 RX ADMIN — Medication 1300 UNIT(S)/HR: at 11:37

## 2024-08-16 NOTE — DIETITIAN INITIAL EVALUATION ADULT - OTHER INFO
- BNP 7210 (H) 8/14. Per electronic medical record, Pt admitted for, "heart failure likely secondary to immunotherapy vs. underlying ischemia. DASH/TLC diet order noted.  - Glucose 134 (H). Hyperglycemia noted 8/14. HbA1c 6.1% (08/15/2024).  Hx of type 2 DM noted. HbA1c indicating good glycemic control. Consistent carbohydrate diet order noted.   - BUN 24 (H)  - Potassium Chloride ordered.  - Diuretics in use, weight fluctuations to be expected.

## 2024-08-16 NOTE — PROGRESS NOTE ADULT - SUBJECTIVE AND OBJECTIVE BOX
Lor Donahue MD   Internal medicine PGY 1  08-14-24 (2d)    Patient is a 74y old  Male who presents with a chief complaint of Shortness of Breath, Chest Pain (16 Aug 2024 06:58)      Subjective / Overnight Events :  - No acute events overnight.  - Pt seen and examined at bedside.     Additional ROS (if any):        MEDICATIONS  (STANDING):  anagrelide 0.5 milliGRAM(s) Oral <User Schedule>  dorzolamide 2%/timolol 0.5% Ophthalmic Solution 1 Drop(s) Both EYES two times a day  furosemide   Injectable 20 milliGRAM(s) IV Push two times a day  imatinib 400 milliGRAM(s) Oral daily  latanoprost 0.005% Ophthalmic Solution 1 Drop(s) Both EYES at bedtime  magnesium sulfate  IVPB 2 Gram(s) IV Intermittent once  potassium chloride    Tablet ER 20 milliEquivalent(s) Oral every 2 hours  sacubitril 24 mG/valsartan 26 mG 1 Tablet(s) Oral two times a day    MEDICATIONS  (PRN):  acetaminophen     Tablet .. 650 milliGRAM(s) Oral every 6 hours PRN Temp greater or equal to 38C (100.4F), Mild Pain (1 - 3)  aluminum hydroxide/magnesium hydroxide/simethicone Suspension 30 milliLiter(s) Oral every 4 hours PRN Dyspepsia  melatonin 3 milliGRAM(s) Oral at bedtime PRN Insomnia  ondansetron Injectable 4 milliGRAM(s) IV Push every 8 hours PRN Nausea and/or Vomiting          PHYSICAL EXAM:  Vital Signs Last 24 Hrs  T(C): 36.7 (16 Aug 2024 04:36), Max: 36.7 (15 Aug 2024 10:59)  T(F): 98 (16 Aug 2024 04:36), Max: 98.1 (15 Aug 2024 10:59)  HR: 80 (16 Aug 2024 05:30) (63 - 98)  BP: 147/64 (16 Aug 2024 05:30) (111/63 - 147/64)  BP(mean): --  RR: 18 (16 Aug 2024 04:36) (18 - 18)  SpO2: 97% (16 Aug 2024 04:36) (97% - 100%)    Parameters below as of 16 Aug 2024 04:36  Patient On (Oxygen Delivery Method): room air        I&O's Summary    15 Aug 2024 07:01  -  16 Aug 2024 07:00  --------------------------------------------------------  IN: 480 mL / OUT: 1800 mL / NET: -1320 mL    16 Aug 2024 07:01  -  16 Aug 2024 07:41  --------------------------------------------------------  IN: 0 mL / OUT: 900 mL / NET: -900 mL          General: NAD, non-toxic appearing   HEENT: EOMi, no scleral icterus  CV: RRR, normal S1 and S2, no m/r/g  Lungs: normal respiratory effort. CTAB, no wheezes, rales, or rhonchi  Abd: soft, nontender, nondistended  Ext: no edema, 2+ peripheral pulses   Pysch: AOx4, appropriate affect   Neuro: grossly non-focal, moving all extremities spontaneously   Skin: no rashes or lesions     LABS:  CAPILLARY BLOOD GLUCOSE                                  11.6   7.55  )-----------( 475      ( 16 Aug 2024 05:26 )             38.1       WBC Trend: 7.55<--, 9.15<--, 10.02<--  Hb Trend: 11.6<--, 13.0<--, 13.3<--, 10.9<--    08-16    141  |  107  |  24<H>  ----------------------------<  134<H>  3.5   |  23  |  1.27    Ca    8.5      16 Aug 2024 05:26  Phos  4.1     08-16  Mg     1.8     08-16    TPro  5.7<L>  /  Alb  3.3  /  TBili  0.8  /  DBili  x   /  AST  23  /  ALT  38  /  AlkPhos  75  08-16    PT/INR - ( 16 Aug 2024 06:52 )   PT: 21.6 sec;   INR: 2.00 ratio         PTT - ( 16 Aug 2024 06:52 )  PTT:34.7 sec      Urinalysis Basic - ( 16 Aug 2024 05:26 )    Color: x / Appearance: x / SG: x / pH: x  Gluc: 134 mg/dL / Ketone: x  / Bili: x / Urobili: x   Blood: x / Protein: x / Nitrite: x   Leuk Esterase: x / RBC: x / WBC x   Sq Epi: x / Non Sq Epi: x / Bacteria: x            RADIOLOGY & ADDITIONAL TESTS: Reviewed     Lor Donahue MD   Internal medicine PGY 1  08-14-24 (2d)    Patient is a 74y old  Male who presents with a chief complaint of Shortness of Breath, Chest Pain (16 Aug 2024 06:58)      Subjective / Overnight Events :  - No acute events overnight.  - Pt seen and examined at bedside.     Additional ROS (if any):        MEDICATIONS  (STANDING):  anagrelide 0.5 milliGRAM(s) Oral <User Schedule>  dorzolamide 2%/timolol 0.5% Ophthalmic Solution 1 Drop(s) Both EYES two times a day  furosemide   Injectable 20 milliGRAM(s) IV Push two times a day  imatinib 400 milliGRAM(s) Oral daily  latanoprost 0.005% Ophthalmic Solution 1 Drop(s) Both EYES at bedtime  magnesium sulfate  IVPB 2 Gram(s) IV Intermittent once  potassium chloride    Tablet ER 20 milliEquivalent(s) Oral every 2 hours  sacubitril 24 mG/valsartan 26 mG 1 Tablet(s) Oral two times a day    MEDICATIONS  (PRN):  acetaminophen     Tablet .. 650 milliGRAM(s) Oral every 6 hours PRN Temp greater or equal to 38C (100.4F), Mild Pain (1 - 3)  aluminum hydroxide/magnesium hydroxide/simethicone Suspension 30 milliLiter(s) Oral every 4 hours PRN Dyspepsia  melatonin 3 milliGRAM(s) Oral at bedtime PRN Insomnia  ondansetron Injectable 4 milliGRAM(s) IV Push every 8 hours PRN Nausea and/or Vomiting          PHYSICAL EXAM:  Vital Signs Last 24 Hrs  T(C): 36.7 (16 Aug 2024 04:36), Max: 36.7 (15 Aug 2024 10:59)  T(F): 98 (16 Aug 2024 04:36), Max: 98.1 (15 Aug 2024 10:59)  HR: 80 (16 Aug 2024 05:30) (63 - 98)  BP: 147/64 (16 Aug 2024 05:30) (111/63 - 147/64)  BP(mean): --  RR: 18 (16 Aug 2024 04:36) (18 - 18)  SpO2: 97% (16 Aug 2024 04:36) (97% - 100%)    Parameters below as of 16 Aug 2024 04:36  Patient On (Oxygen Delivery Method): room air        I&O's Summary    15 Aug 2024 07:01  -  16 Aug 2024 07:00  --------------------------------------------------------  IN: 480 mL / OUT: 1800 mL / NET: -1320 mL    16 Aug 2024 07:01  -  16 Aug 2024 07:41  --------------------------------------------------------  IN: 0 mL / OUT: 900 mL / NET: -900 mL      General: NAD, non-toxic appearing   HEENT: EOMi, no scleral icterus  CV: RRR, normal S1 and S2, no m/r/g  Lungs: normal respiratory effort. CTAB, no wheezes, rales, or rhonchi  Abd: soft, nontender, nondistended  Ext: no edema, 2+ peripheral pulses   Pysch: AOx4, appropriate affect   Neuro: grossly non-focal, moving all extremities spontaneously   Skin: no rashes or lesions     LABS:  CAPILLARY BLOOD GLUCOSE                             11.6   7.55  )-----------( 475      ( 16 Aug 2024 05:26 )             38.1       WBC Trend: 7.55<--, 9.15<--, 10.02<--  Hb Trend: 11.6<--, 13.0<--, 13.3<--, 10.9<--    08-16    141  |  107  |  24<H>  ----------------------------<  134<H>  3.5   |  23  |  1.27    Ca    8.5      16 Aug 2024 05:26  Phos  4.1     08-16  Mg     1.8     08-16    TPro  5.7<L>  /  Alb  3.3  /  TBili  0.8  /  DBili  x   /  AST  23  /  ALT  38  /  AlkPhos  75  08-16    PT/INR - ( 16 Aug 2024 06:52 )   PT: 21.6 sec;   INR: 2.00 ratio         PTT - ( 16 Aug 2024 06:52 )  PTT:34.7 sec      Urinalysis Basic - ( 16 Aug 2024 05:26 )    Color: x / Appearance: x / SG: x / pH: x  Gluc: 134 mg/dL / Ketone: x  / Bili: x / Urobili: x   Blood: x / Protein: x / Nitrite: x   Leuk Esterase: x / RBC: x / WBC x   Sq Epi: x / Non Sq Epi: x / Bacteria: x      RADIOLOGY & ADDITIONAL TESTS: Reviewed

## 2024-08-16 NOTE — DIETITIAN INITIAL EVALUATION ADULT - LITERATURE/VIDEOS GIVEN
Reviewed CHF diet education. Discussed Na restriction, foods high in Na to avoid, reading food labels, tips for limiting Na in your diet. Reviewed daily weights, Wt gain parameters to contact MD. Discussed Na intake in relation to fluid retention, edema and Wt gain. Pt verbalized understanding and accepted Heart Failure Nutrition Therapy Handout.  RD remains available for diet education review.

## 2024-08-16 NOTE — PROGRESS NOTE ADULT - NSPROGADDITIONALINFOA_GEN_ALL_CORE
HFrEF  (EF = 20-25% Global LV hypokinesis on Date 8/14/24)  Heart failure, CHF order set initiated    Daily weight reviewed today decreased.  Guideline directed medical therapy as below:   - Diuretic: IV lasix 40mg BID  - BB: Toprol 25mg QD to be started tmrw  - ARNI/ACE-I/ARB: Entresto 24/26  - Hydralazine/Nitrate: not started, to be further evaluated outpatient  - MRA: not started, to be further evaluated outpatient  - SGLT2: not started, to be further evaluated outpatient    HFrEF - Recommend diuretics, BB, ARNI preferred/ACE-I/ARB, MRA, & SGLT2

## 2024-08-16 NOTE — PROGRESS NOTE ADULT - ASSESSMENT
74M PMH T2DM, MDS/MPN/CML (formerly on luspatercept, jakafi, anagrelide, ASA, imatinib), PE on Eliquis (05/2024), chronic venous insufficiency, lyme disease, glaucoma consulted for new HFrEF 20-25%.       #HFrEF  - Echo shows EF 20-25%  - C/w IV lasix  - C/w fluid restriction  - Will start GDMT : Spironolactone metorpolol, Entresto, SGLT2  - IV Heparin pending [facility] today   - hold memelotinib if no objection from heme onc   - monitor rhythm   - start on Statin     All recommendations unofficial pending attending attestation.  74M PMH T2DM, MDS/MPN/CML (formerly on luspatercept, jakafi, anagrelide, ASA, imatinib), PE on Eliquis (05/2024), chronic venous insufficiency, lyme disease, glaucoma consulted for new HFrEF 20-25%.       #HFrEF  - Echo shows EF 20-25%  - IV lasix 40 BID  - C/w fluid restriction  - start Metorpolol tomorrow if okay on Entresto today.  - Will start GDMT : Spironolactone metorpolol, Entresto, SGLT2  - IV Heparin pending [facility]  - hold memelotinib if no objection from heme onc   - monitor rhythm   - start on Statin     All recommendations unofficial pending attending attestation.  74M PMH T2DM, MDS/MPN/CML (formerly on luspatercept, jakafi, anagrelide, ASA, imatinib), PE on Eliquis (05/2024), chronic venous insufficiency, lyme disease, glaucoma consulted for new HFrEF 20-25%.       #HFrEF  - Echo shows EF 20-25%  - IV lasix 40 BID  - I/Os and daily weights  - Entresto 24/26  - start metoprolol 25 XL tomorrow as tolerated  - hold apixaban in anticipation of cath - switch to lovenox today given recent PE  - IV heparin starting Sat night for diagnostic cath on Sunday  - would hold memelotinib and anagrelide if acceptable from Heme/Onc perspective pending cardiac workup given adverse cardiac event risk noted  - monitor rhythm   - start statin  - continue aspirin

## 2024-08-16 NOTE — DIETITIAN INITIAL EVALUATION ADULT - PROBLEM SELECTOR PLAN 2
Pt follows with Dr. Beyer outpatient  Previously on Jakafi, Luspatercept. Latest outpt regimen includes Anagrelide, Ojjaara, Imatinib,  Evidence of TLS in past, supposed to start Allopurinol    Plan:  - hematology consulted, appreciate recs  - c/w anagrelide 0.5mg pO BID  - c/w Imatinib 400mg pO qd  - c/w Ojjaara 200mg qd, will need to bring from home  - f/u TLS labs  - hold meds  -- Jakafi, Luspatercept - 1.75mg/kg q3 weeks,   -- ASA - hold while on eliquis  -- consider spleen US

## 2024-08-16 NOTE — PROGRESS NOTE ADULT - PROBLEM SELECTOR PLAN 3
CTPE (05/2024): Pt with Acute bilateral pulmonary emboli within the subsegmental arteries of all lobes. No evidence of right heart strain or pulmonary infarct.  CTPE (08/14): No pulmonary embolus through the segmental branches. Mildinterstitial edema and small pleural effusions.  Was on eliquis starter pack, pt was taking 5mg qd, noticed having oral bleeding. Unclear how compliant patient was with eliquis    Plan:  - c/w Eliquis 5mg BID CTPE (05/2024): Pt with Acute bilateral pulmonary emboli within the subsegmental arteries of all lobes. No evidence of right heart strain or pulmonary infarct.  CTPE (08/14): No pulmonary embolus through the segmental branches. Mildinterstitial edema and small pleural effusions.  Was on eliquis starter pack, pt was taking 5mg qd, noticed having oral bleeding. Unclear how compliant patient was with eliquis    Plan:  - started on heparin gtt, anticipation of LHC  - hold Eliquis 5mg BID

## 2024-08-16 NOTE — PROGRESS NOTE ADULT - SUBJECTIVE AND OBJECTIVE BOX
Progress Note    08-14-24 (2d)    Patient is a 74y old  Male who presents with a chief complaint of Shortness of Breath, Chest Pain (15 Aug 2024 11:48)      Subjective / Overnight Events :  - No acute events overnight.  - Pt seen and examined at bedside.     Additional ROS (if any):    MEDICATIONS  (STANDING):  anagrelide 0.5 milliGRAM(s) Oral <User Schedule>  dorzolamide 2%/timolol 0.5% Ophthalmic Solution 1 Drop(s) Both EYES two times a day  furosemide   Injectable 20 milliGRAM(s) IV Push two times a day  imatinib 400 milliGRAM(s) Oral daily  latanoprost 0.005% Ophthalmic Solution 1 Drop(s) Both EYES at bedtime  sacubitril 24 mG/valsartan 26 mG 1 Tablet(s) Oral two times a day    MEDICATIONS  (PRN):  acetaminophen     Tablet .. 650 milliGRAM(s) Oral every 6 hours PRN Temp greater or equal to 38C (100.4F), Mild Pain (1 - 3)  aluminum hydroxide/magnesium hydroxide/simethicone Suspension 30 milliLiter(s) Oral every 4 hours PRN Dyspepsia  melatonin 3 milliGRAM(s) Oral at bedtime PRN Insomnia  ondansetron Injectable 4 milliGRAM(s) IV Push every 8 hours PRN Nausea and/or Vomiting          PHYSICAL EXAM:  Vital Signs Last 24 Hrs  T(C): 36.7 (16 Aug 2024 04:36), Max: 36.7 (15 Aug 2024 10:59)  T(F): 98 (16 Aug 2024 04:36), Max: 98.1 (15 Aug 2024 10:59)  HR: 80 (16 Aug 2024 05:30) (63 - 98)  BP: 147/64 (16 Aug 2024 05:30) (111/63 - 147/64)  BP(mean): --  RR: 18 (16 Aug 2024 04:36) (18 - 18)  SpO2: 97% (16 Aug 2024 04:36) (97% - 100%)    Parameters below as of 16 Aug 2024 04:36  Patient On (Oxygen Delivery Method): room air        I&O's Summary    15 Aug 2024 07:01  -  16 Aug 2024 06:58  --------------------------------------------------------  IN: 480 mL / OUT: 1800 mL / NET: -1320 mL        General: NAD  HEENT: PERRLA, EOMi, no scleral icterus  CV: RRR, normal S1 and S2, no m/r/g  Lungs: normal respiratory effort. CTAB, no wheezes, rales, or rhonchi  Abd: soft, nontender, nondistended, BSx4  Ext: no edema, 2+ peripheral pulses   Pysch: AAOx3  Neuro: grossly non-focal, moving all extremities spontaneously   Skin: no rashes or lesions     LABS:  CAPILLARY BLOOD GLUCOSE                                  11.6   7.55  )-----------( 475      ( 16 Aug 2024 05:26 )             38.1       WBC Trend: 7.55<--, 9.15<--, 10.02<--  Hb Trend: 11.6<--, 13.0<--, 13.3<--, 10.9<--    08-16    141  |  107  |  24<H>  ----------------------------<  134<H>  3.5   |  23  |  1.27    Ca    8.5      16 Aug 2024 05:26  Phos  4.1     08-16  Mg     1.8     08-16    TPro  5.7<L>  /  Alb  3.3  /  TBili  0.8  /  DBili  x   /  AST  23  /  ALT  38  /  AlkPhos  75  08-16    PT/INR - ( 14 Aug 2024 07:25 )   PT: 22.1 sec;   INR: 2.15 ratio         PTT - ( 14 Aug 2024 07:25 )  PTT:42.5 sec      Urinalysis Basic - ( 16 Aug 2024 05:26 )    Color: x / Appearance: x / SG: x / pH: x  Gluc: 134 mg/dL / Ketone: x  / Bili: x / Urobili: x   Blood: x / Protein: x / Nitrite: x   Leuk Esterase: x / RBC: x / WBC x   Sq Epi: x / Non Sq Epi: x / Bacteria: x            RADIOLOGY & ADDITIONAL TESTS: Reviewed Progress Note    08-14-24 (2d)    Patient is a 74y old  Male who presents with a chief complaint of Shortness of Breath, Chest Pain (15 Aug 2024 11:48)      Subjective / Overnight Events :  - No acute events overnight.  - Pt seen and examined at bedside.   -     Additional ROS (if any):    MEDICATIONS  (STANDING):  anagrelide 0.5 milliGRAM(s) Oral <User Schedule>  dorzolamide 2%/timolol 0.5% Ophthalmic Solution 1 Drop(s) Both EYES two times a day  furosemide   Injectable 20 milliGRAM(s) IV Push two times a day  imatinib 400 milliGRAM(s) Oral daily  latanoprost 0.005% Ophthalmic Solution 1 Drop(s) Both EYES at bedtime  sacubitril 24 mG/valsartan 26 mG 1 Tablet(s) Oral two times a day    MEDICATIONS  (PRN):  acetaminophen     Tablet .. 650 milliGRAM(s) Oral every 6 hours PRN Temp greater or equal to 38C (100.4F), Mild Pain (1 - 3)  aluminum hydroxide/magnesium hydroxide/simethicone Suspension 30 milliLiter(s) Oral every 4 hours PRN Dyspepsia  melatonin 3 milliGRAM(s) Oral at bedtime PRN Insomnia  ondansetron Injectable 4 milliGRAM(s) IV Push every 8 hours PRN Nausea and/or Vomiting          PHYSICAL EXAM:  Vital Signs Last 24 Hrs  T(C): 36.7 (16 Aug 2024 04:36), Max: 36.7 (15 Aug 2024 10:59)  T(F): 98 (16 Aug 2024 04:36), Max: 98.1 (15 Aug 2024 10:59)  HR: 80 (16 Aug 2024 05:30) (63 - 98)  BP: 147/64 (16 Aug 2024 05:30) (111/63 - 147/64)  BP(mean): --  RR: 18 (16 Aug 2024 04:36) (18 - 18)  SpO2: 97% (16 Aug 2024 04:36) (97% - 100%)    Parameters below as of 16 Aug 2024 04:36  Patient On (Oxygen Delivery Method): room air        I&O's Summary    15 Aug 2024 07:01  -  16 Aug 2024 06:58  --------------------------------------------------------  IN: 480 mL / OUT: 1800 mL / NET: -1320 mL        General: NAD  HEENT: PERRLA, EOMi, no scleral icterus  CV: RRR, normal S1 and S2, no m/r/g  Lungs: normal respiratory effort. CTAB, no wheezes, rales, or rhonchi  Abd: soft, nontender, nondistended, BSx4  Ext: no edema, 2+ peripheral pulses   Pysch: AAOx3  Neuro: grossly non-focal, moving all extremities spontaneously   Skin: no rashes or lesions     LABS:  CAPILLARY BLOOD GLUCOSE                                  11.6   7.55  )-----------( 475      ( 16 Aug 2024 05:26 )             38.1       WBC Trend: 7.55<--, 9.15<--, 10.02<--  Hb Trend: 11.6<--, 13.0<--, 13.3<--, 10.9<--    08-16    141  |  107  |  24<H>  ----------------------------<  134<H>  3.5   |  23  |  1.27    Ca    8.5      16 Aug 2024 05:26  Phos  4.1     08-16  Mg     1.8     08-16    TPro  5.7<L>  /  Alb  3.3  /  TBili  0.8  /  DBili  x   /  AST  23  /  ALT  38  /  AlkPhos  75  08-16    PT/INR - ( 14 Aug 2024 07:25 )   PT: 22.1 sec;   INR: 2.15 ratio         PTT - ( 14 Aug 2024 07:25 )  PTT:42.5 sec      Urinalysis Basic - ( 16 Aug 2024 05:26 )    Color: x / Appearance: x / SG: x / pH: x  Gluc: 134 mg/dL / Ketone: x  / Bili: x / Urobili: x   Blood: x / Protein: x / Nitrite: x   Leuk Esterase: x / RBC: x / WBC x   Sq Epi: x / Non Sq Epi: x / Bacteria: x            RADIOLOGY & ADDITIONAL TESTS: Reviewed Progress Note    08-14-24 (2d)    Patient is a 74y old  Male who presents with a chief complaint of Shortness of Breath, Chest Pain (15 Aug 2024 11:48)      Subjective / Overnight Events :  - No acute events overnight.  - Pt seen and examined at bedside.   - Patient feeling well, no sob, able to lay flat and slept well  - Denies cp, abd pain, leg pain, dysuria, d/c    Additional ROS (if any):    MEDICATIONS  (STANDING):  anagrelide 0.5 milliGRAM(s) Oral <User Schedule>  dorzolamide 2%/timolol 0.5% Ophthalmic Solution 1 Drop(s) Both EYES two times a day  furosemide   Injectable 20 milliGRAM(s) IV Push two times a day  imatinib 400 milliGRAM(s) Oral daily  latanoprost 0.005% Ophthalmic Solution 1 Drop(s) Both EYES at bedtime  sacubitril 24 mG/valsartan 26 mG 1 Tablet(s) Oral two times a day    MEDICATIONS  (PRN):  acetaminophen     Tablet .. 650 milliGRAM(s) Oral every 6 hours PRN Temp greater or equal to 38C (100.4F), Mild Pain (1 - 3)  aluminum hydroxide/magnesium hydroxide/simethicone Suspension 30 milliLiter(s) Oral every 4 hours PRN Dyspepsia  melatonin 3 milliGRAM(s) Oral at bedtime PRN Insomnia  ondansetron Injectable 4 milliGRAM(s) IV Push every 8 hours PRN Nausea and/or Vomiting          PHYSICAL EXAM:  Vital Signs Last 24 Hrs  T(C): 36.7 (16 Aug 2024 04:36), Max: 36.7 (15 Aug 2024 10:59)  T(F): 98 (16 Aug 2024 04:36), Max: 98.1 (15 Aug 2024 10:59)  HR: 80 (16 Aug 2024 05:30) (63 - 98)  BP: 147/64 (16 Aug 2024 05:30) (111/63 - 147/64)  BP(mean): --  RR: 18 (16 Aug 2024 04:36) (18 - 18)  SpO2: 97% (16 Aug 2024 04:36) (97% - 100%)    Parameters below as of 16 Aug 2024 04:36  Patient On (Oxygen Delivery Method): room air        I&O's Summary    15 Aug 2024 07:01  -  16 Aug 2024 06:58  --------------------------------------------------------  IN: 480 mL / OUT: 1800 mL / NET: -1320 mL        General: NAD  CV: RRR, normal S1 and S2, no m/r/g  Lungs: normal respiratory effort. b/l basilar coarse breath sounds  Abd: soft, nontender, nondistended, BSx4  Ext: +1 LE edema, 2+ peripheral pulses   Pysch: AAOx3     LABS:  CAPILLARY BLOOD GLUCOSE                                  11.6   7.55  )-----------( 475      ( 16 Aug 2024 05:26 )             38.1       WBC Trend: 7.55<--, 9.15<--, 10.02<--  Hb Trend: 11.6<--, 13.0<--, 13.3<--, 10.9<--    08-16    141  |  107  |  24<H>  ----------------------------<  134<H>  3.5   |  23  |  1.27    Ca    8.5      16 Aug 2024 05:26  Phos  4.1     08-16  Mg     1.8     08-16    TPro  5.7<L>  /  Alb  3.3  /  TBili  0.8  /  DBili  x   /  AST  23  /  ALT  38  /  AlkPhos  75  08-16    PT/INR - ( 14 Aug 2024 07:25 )   PT: 22.1 sec;   INR: 2.15 ratio         PTT - ( 14 Aug 2024 07:25 )  PTT:42.5 sec      Urinalysis Basic - ( 16 Aug 2024 05:26 )    Color: x / Appearance: x / SG: x / pH: x  Gluc: 134 mg/dL / Ketone: x  / Bili: x / Urobili: x   Blood: x / Protein: x / Nitrite: x   Leuk Esterase: x / RBC: x / WBC x   Sq Epi: x / Non Sq Epi: x / Bacteria: x            RADIOLOGY & ADDITIONAL TESTS: Reviewed

## 2024-08-16 NOTE — DISCHARGE NOTE PROVIDER - CARE PROVIDER_API CALL
Tee Contreras  Cardiovascular Disease  [address] [address] [place] [address], [address]  [place] [address]  Phone: [phone]  Fax: [phone]  Established Patient  Follow Up Time: 2 weeks    Jose Mayers  Medical Oncology  [address] [place] [address]  Phone: [phone]  Fax: [phone]  Established Patient  Follow Up Time: 1 week   Tee Contreras  Cardiovascular Disease  [address] [place] [address], [address]  [place] [address]  Phone: [phone]  Fax: [phone]  Established Patient  Follow Up Time: 2 weeks    Jose Mayers  Medical Oncology  [address] [place] [address]  Phone: [phone]  Fax: [phone]  Established Patient  Follow Up Time: 1 week    Fili Landa  Internal Medicine  [address] [facility], [address]  [place] [address]  Phone: [phone]  Fax: [phone]  Follow Up Time: Routine   Tee Contreras  Cardiovascular Disease  [address] [place] [address], [address]  [place] [address]  Phone: [phone]  Fax: [phone]  Established Patient  Follow Up Time: 2 weeks    Jose Mayers  Medical Oncology  [address] [place] [address]  Phone: [phone]  Fax: [phone]  Established Patient  Follow Up Time: 1 week    Internal Medicine at the [facility],   [address] [place] [address], [facility] [address]  [place] [address]  Phone: [phone]  Fax: (   )    -  Established Patient  Scheduled Appointment: 09/16/2024 09:30 AM

## 2024-08-16 NOTE — DIETITIAN INITIAL EVALUATION ADULT - ADD RECOMMEND
1. Continue DASH/TLC; consistent carbohydrate diet as medically appropriate.   2. Recommend Ensure Max Chocolate once daily to optimize PO intake.  3. Hx HF noted. Recommend daily weights.   4. Monitor routine weights, nutrition related labs, PO intake and tolerance, oral nutrition supplement compliance, and skin integrity.  5. Malnutrition sticker placed.

## 2024-08-16 NOTE — DISCHARGE NOTE PROVIDER - NSDCCPTREATMENT_GEN_ALL_CORE_FT
PRINCIPAL PROCEDURE  Procedure: Transthoracic echocardiography (TTE)  Findings and Treatment: CONCLUSIONS:      1. Left ventricular systolic function is severely decreased with an ejection fraction visually estimated at 20 to 25 %. Global left ventricular hypokinesis.   2. Mildly reduced right ventricular systolic function.   3. Left atrium is moderately dilated.   4. Compared to the transthoracic echocardiogram performed on 5/23/2024, biventricular systolic function has worsened.       PRINCIPAL PROCEDURE  Procedure: Transthoracic echocardiography (TTE)  Findings and Treatment: CONCLUSIONS:      1. Left ventricular systolic function is severely decreased with an ejection fraction visually estimated at 20 to 25 %. Global left ventricular hypokinesis.   2. Mildly reduced right ventricular systolic function.   3. Left atrium is moderately dilated.   4. Compared to the transthoracic echocardiogram performed on 5/23/2024, biventricular systolic function has worsened.        SECONDARY PROCEDURE  Procedure: Left heart cardiac cath  Findings and Treatment:   Diagnostic Conclusions:   No obstructive coronary artery disease.   Hemodynamics notable for low biventricular filling pressures with  preserved cardiac output.

## 2024-08-16 NOTE — DISCHARGE NOTE PROVIDER - HOSPITAL COURSE
Discharge Summary     Admit Date: 08-14-24  Discharge Date:    Discharge diagnoses:   ***    HPI:    ED Course:    Hospital Course:       On day of discharge, patient is clinically stable with no new exam findings or acute symptoms compared to prior. The patient was seen by the attending physician on the date of discharge and deemed stable and acceptable for discharge. The patient's chronic medical conditions were treated accordingly per the patient's home medication regimen. The patient's medication reconciliation (with changes made to chronic medications), follow up appointments, discharge orders, instructions, and significant lab and diagnostic studies are as noted.     Discharge follow up action items:     1. Follow up with PCP in 1-2 weeks.   2. Follow up labs, path, & imaging ***  3. Medication changes ***  4. On hold medications ***    Patient's ordered code status: ***    Patient disposition: ***     Discharge Summary     Admit Date: 08-14-24  Discharge Date:    Discharge diagnoses:   Heart Failure    HPI:  74M PMH T2DM, MDS/MPN/CML (formerly on luspatercept, jakafi, anagrelide, ASA, imatinib), PE on Eliquis (05/2024), chronic venous insufficiency, lyme disease, glaucoma presented to ED initially for L sided chest pain and sob. Noticed he was having progressively worsening chest pressure, exertional dyspnea, orthopnea, and increased b/l LE edema. 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     Denies headache, cough, abd pain, f/c, n/v, d/c, dysuria.    Recent admission in May 2024 to [facility] [place] w/ cough. Found to have PE, started on heparin gtt, transitioned to eliquis. EKG with diffuse TWI, trops downtrending. [id number]. TTE EF 49%, G1DD, no RHS.     ED Course: Received IV lasix 20mg, cardiology consulted.     Hospital Course:   Patient had TTE performed inpatient which demonstrated reduced ejection fraction to 20%. Seen by cardiology, started on IV Lasix 40mg BID. GDMT started, patient tolerating entresto well. To start metoprolol. [facility] pending showed ________. Also seen by hematology team for history of MPN/MDS. Continued home dose imatibib and anagrelide. Held ojjaro,  and lusatercept. Started on atorvastatin 40mg. Held eliquis, started on lovenox 75mg BID.       On day of discharge, patient is clinically stable with no new exam findings or acute symptoms compared to prior. The patient was seen by the attending physician on the date of discharge and deemed stable and acceptable for discharge. The patient's chronic medical conditions were treated accordingly per the patient's home medication regimen. The patient's medication reconciliation (with changes made to chronic medications), follow up appointments, discharge orders, instructions, and significant lab and diagnostic studies are as noted.     Discharge follow up action items:     1. Follow up with PCP in 1-2 weeks.   2. Follow up with cardiology and hematology  2. Follow up labs, path, & imaging - none  3. Medication changes - entresto, metoprolol  4. On hold medications - ojjaro, lusatercept    Patient's ordered code status: Full Code    Patient disposition: Home     Discharge Summary     Admit Date: 08-14-24  Discharge Date:    Discharge diagnoses:   Heart Failure    HPI:  74M PMH T2DM, MDS/MPN/CML (formerly on luspatercept, jakafi, anagrelide, ASA, imatinib), PE on Eliquis (05/2024), chronic venous insufficiency, lyme disease, glaucoma presented to ED initially for L sided chest pain and sob. Noticed he was having progressively worsening chest pressure, exertional dyspnea, orthopnea, and increased b/l LE edema. 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     Denies headache, cough, abd pain, f/c, n/v, d/c, dysuria.    Recent admission in May 2024 to [facility] [place] w/ cough. Found to have PE, started on heparin gtt, transitioned to eliquis. EKG with diffuse TWI, trops downtrending. [id number]. TTE EF 49%, G1DD, no RHS.     ED Course: Received IV lasix 20mg, cardiology consulted.     Hospital Course:   Patient had TTE performed inpatient which demonstrated reduced ejection fraction to 20%. Seen by cardiology, started on IV Lasix 40mg BID. GDMT started, patient tolerating entresto well. To start metoprolol. [facility] pending showed ________. Also seen by hematology team for history of MPN/MDS. Continued home dose imatibib and anagrelide. Held ojjaara,  and lusatercept. Started on atorvastatin 40mg. Held eliquis, started on lovenox 75mg BID.       On day of discharge, patient is clinically stable with no new exam findings or acute symptoms compared to prior. The patient was seen by the attending physician on the date of discharge and deemed stable and acceptable for discharge. The patient's chronic medical conditions were treated accordingly per the patient's home medication regimen. The patient's medication reconciliation (with changes made to chronic medications), follow up appointments, discharge orders, instructions, and significant lab and diagnostic studies are as noted.     Discharge follow up action items:     1. Follow up with PCP in 1-2 weeks.   2. Follow up with cardiology and hematology  2. Follow up labs, path, & imaging - none  3. Medication changes - entresto, metoprolol  4. On hold medications - ojjaara, lusatercept    Patient's ordered code status: Full Code    Patient disposition: Home     Discharge Summary     Admit Date: 08-14-24  Discharge Date: 08-19-24    Discharge diagnoses:   Heart Failure    HPI:  74M PMH T2DM, MDS/MPN/CML (formerly on luspatercept, jakafi, anagrelide, ASA, imatinib), PE on Eliquis (05/2024), chronic venous insufficiency, lyme disease, glaucoma presented to ED initially for L sided chest pain and sob. Noticed he was having progressively worsening chest pressure, exertional dyspnea, orthopnea, and increased b/l LE edema. 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     Denies headache, cough, abd pain, f/c, n/v, d/c, dysuria.    Recent admission in May 2024 to [facility] [place] w/ [place]. Found to have PE, started on heparin gtt, transitioned to eliquis. EKG with diffuse TWI, trops downtrending. [id number]. TTE EF 49%, G1DD, no RHS.     ED Course: Received IV lasix 20mg, cardiology consulted.     Hospital Course:   Patient had TTE performed inpatient which demonstrated reduced ejection fraction to 20%. Seen by cardiology, started on IV Lasix 40mg BID. GDMT started, patient tolerating entresto 48/52 and metoprolol 25mg qd well. To start metoprolol. [facility] pending showed non obstructive CAD. Also seen by hematology team for history of MPN/MDS. Continued home dose imatibib and anagrelide. Held ojjaara,  and lusatercept. Started on atorvastatin 40mg. Started on lovenox 75mg BID for cath, transitioned back to eliquis 5mg BID.      On day of discharge, patient is clinically stable with no new exam findings or acute symptoms compared to prior. The patient was seen by the attending physician on the date of discharge and deemed stable and acceptable for discharge. The patient's chronic medical conditions were treated accordingly per the patient's home medication regimen. The patient's medication reconciliation (with changes made to chronic medications), follow up appointments, discharge orders, instructions, and significant lab and diagnostic studies are as noted.     Discharge follow up action items:     1. Follow up with PCP in 1-2 weeks.   2. Follow up with cardiology and hematology  2. Follow up labs, path, & imaging - cardiac MRI  3. Medication changes - entresto 48/52 BID, metoprolol succinate 25mg qd  4. On hold medications - ojjaara, lusatercept    Patient's ordered code status: Full Code    Patient disposition: Home     Discharge Summary     Admit Date: 08-14-24  Discharge Date: 08-19-24    Discharge diagnoses:   Heart Failure    HPI:  74M PMH T2DM, MDS/MPN/CML (formerly on luspatercept, jakafi, anagrelide, ASA, imatinib), PE on Eliquis (05/2024), chronic venous insufficiency, lyme disease, glaucoma presented to ED initially for L sided chest pain and sob. Noticed he was having progressively worsening chest pressure, exertional dyspnea, orthopnea, and increased b/l LE edema. 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     Denies headache, cough, abd pain, f/c, n/v, d/c, dysuria.    Recent admission in May 2024 to [facility] [place] w/ [place]. Found to have PE, started on heparin gtt, transitioned to eliquis. EKG with diffuse TWI, trops downtrending. [id number]. TTE EF 49%, G1DD, no RHS.     ED Course: Received IV lasix 20mg, cardiology consulted.     Hospital Course:   Patient had TTE performed inpatient which demonstrated reduced ejection fraction to 20%. Seen by cardiology, started on IV Lasix 40mg BID. GDMT started, patient tolerating entresto 48/52 and metoprolol 25mg qd well. To start metoprolol. [facility] pending showed non obstructive CAD. Also seen by hematology team for history of MPN/MDS. Continued home dose imatibib and anagrelide. Held ojjaara,  and lusatercept. Started on atorvastatin 40mg. Started on lovenox 75mg BID for cath, transitioned back to eliquis 5mg BID. dry weight of 69.7kg in discharge       On day of discharge, patient is clinically stable with no new exam findings or acute symptoms compared to prior. The patient was seen by the attending physician on the date of discharge and deemed stable and acceptable for discharge. The patient's chronic medical conditions were treated accordingly per the patient's home medication regimen. The patient's medication reconciliation (with changes made to chronic medications), follow up appointments, discharge orders, instructions, and significant lab and diagnostic studies are as noted.     Discharge follow up action items:     1. Follow up with PCP in 1-2 weeks.   2. Follow up with cardiology and hematology  2. Follow up labs, path, & imaging - cardiac MRI  3. Medication changes - entresto 48/52 BID, metoprolol succinate 25mg qd  4. On hold medications - ojjaara, lusatercept    Patient's ordered code status: Full Code    Patient disposition: Home

## 2024-08-16 NOTE — DIETITIAN INITIAL EVALUATION ADULT - PERTINENT MEDS FT
MEDICATIONS  (STANDING):  anagrelide 0.5 milliGRAM(s) Oral <User Schedule>  atorvastatin 40 milliGRAM(s) Oral at bedtime  dorzolamide 2%/timolol 0.5% Ophthalmic Solution 1 Drop(s) Both EYES two times a day  furosemide   Injectable 40 milliGRAM(s) IV Push two times a day  heparin  Infusion.  Unit(s)/Hr (13 mL/Hr) IV Continuous <Continuous>  imatinib 400 milliGRAM(s) Oral daily  latanoprost 0.005% Ophthalmic Solution 1 Drop(s) Both EYES at bedtime  sacubitril 24 mG/valsartan 26 mG 1 Tablet(s) Oral two times a day    MEDICATIONS  (PRN):  acetaminophen     Tablet .. 650 milliGRAM(s) Oral every 6 hours PRN Temp greater or equal to 38C (100.4F), Mild Pain (1 - 3)  aluminum hydroxide/magnesium hydroxide/simethicone Suspension 30 milliLiter(s) Oral every 4 hours PRN Dyspepsia  heparin   Injectable 6000 Unit(s) IV Push every 6 hours PRN For aPTT less than 40  heparin   Injectable 3000 Unit(s) IV Push every 6 hours PRN For aPTT between 40 - 57  melatonin 3 milliGRAM(s) Oral at bedtime PRN Insomnia  ondansetron Injectable 4 milliGRAM(s) IV Push every 8 hours PRN Nausea and/or Vomiting

## 2024-08-16 NOTE — DIETITIAN INITIAL EVALUATION ADULT - REASON INDICATOR FOR ASSESSMENT
Dietitian consult received for heart failure, STAR Pt  Chart reviewed, events noted  Information obtained from electronic medical record, Pt visited

## 2024-08-16 NOTE — DIETITIAN INITIAL EVALUATION ADULT - PERTINENT LABORATORY DATA
08-16    141  |  107  |  24<H>  ----------------------------<  134<H>  3.5   |  23  |  1.27    Ca    8.5      16 Aug 2024 05:26  Phos  4.1     08-16  Mg     1.8     08-16    TPro  5.7<L>  /  Alb  3.3  /  TBili  0.8  /  DBili  x   /  AST  23  /  ALT  38  /  AlkPhos  75  08-16  A1C with Estimated Average Glucose Result: 6.1 % (08-15-24 @ 06:21)

## 2024-08-16 NOTE — PROGRESS NOTE ADULT - PROBLEM SELECTOR PLAN 1
Pt with jimenez, orthopnea, b/l LE pitting edema, elevated BNP  h/o treatment with immunotherapy with possible cardiotoxicity  CXR (8/14): Mild basal congestion with trace effusions left basilar atelectasis..  Lipid Panel wnl  TTE (8/14): EF20-25%    Plan:  - IV lasix 20mg BID  - cardiology following, appreciate recs  -- consider starting GDMT pending price check from pharmacy  -- consider [facility]  - monitor lytes  - strict I/Os  - standing weights  - tele  - fluid restriction 1L Pt with jimenez, orthopnea, b/l LE pitting edema, elevated BNP  h/o treatment with immunotherapy with possible cardiotoxicity  CXR (8/14): Mild basal congestion with trace effusions left basilar atelectasis..  Lipid Panel wnl  TTE (8/14): EF20-25%    Plan:  - cardiology following, appreciate recs  - IV lasix 40mg BID  - Started entresto 24/26 BID  - Will start metoprolol tartrate 12.5BID tomorrow  - pending [facility]  - monitor lytes  - strict I/Os  - standing weights  - tele  - fluid restriction 1L

## 2024-08-16 NOTE — DIETITIAN INITIAL EVALUATION ADULT - PERSON TAUGHT/METHOD
Handouts provided:   Heart Failure Nutrition Therapy  Heart Healthy Label Reading Tips  Heart Healthy Shopping Tips  Sodium (salt) Content of Foods/verbal instruction/written material/patient instructed

## 2024-08-16 NOTE — DIETITIAN INITIAL EVALUATION ADULT - ENERGY INTAKE
Per nursing flowsheets, % PO intake. Pt visited, breakfast tray observed 100% consumed during visit. Offered oral nutrition supplement to optimize PO intake. Pt accepting./Adequate (%)

## 2024-08-16 NOTE — DISCHARGE NOTE PROVIDER - NSDCFUADDAPPT_GEN_ALL_CORE_FT
APPTS ARE READY TO BE MADE: [ ] YES    Best Family or Patient Contact (if needed):    Additional Information about above appointments (if needed):    1:   2:   3:     Other comments or requests:    APPTS ARE READY TO BE MADE: [X] YES    Best Family or Patient Contact (if needed):    Additional Information about above appointments (if needed):    1:   2:   3:     Other comments or requests:    APPTS ARE READY TO BE MADE: [X] YES    Best Family or Patient Contact (if needed):    Additional Information about above appointments (if needed):    1:   2:   3:     Other comments or requests:   Reached out to heart failure clinic for appointment; in progress.   APPTS ARE READY TO BE MADE: [X] YES    Best Family or Patient Contact (if needed):    Additional Information about above appointments (if needed):    1:   2:   3:     Other comments or requests:   Reached out to heart failure clinic for appointment; in progress.      8/26, 8/27, 8/28 Patient was outreached but did not answer. A voicemail was left for the patient to return our call. Cardio and to see if want PCP sooner  Prior to outreaching the patient, it was visible that the patient has secured a follow up appointment which was not scheduled by our team.. Dr. Vilchis 9/16 at 9:30am and Seo NP, Joo Yeon on 8/28 at 2:30pm

## 2024-08-16 NOTE — PROGRESS NOTE ADULT - ATTENDING COMMENTS
# Acute systolic heart failure, pending ischemic workup.  # JAK2 + BCR-ABl + MDS - high risk for cardiac events  # Recent PE on anticoagulation    Patient reports feeling well and his appetite is good.  He reports not having chest pain or orthopnea.    Cath postponed today because patient received apixaban last night.    would switch to weight based lovenox today given recent PE to ensure consistent anticoagulation  Hold AM dose if can be added for cath on Sunday    Continue Entresto  Diuresis for goal net negative 1-2 L daily   Add low dose metoprolol tomorrow if tolerating  A repeat pro-BNP should be checked on day of discharge once optimized    Continue inpatient management for workup of ischemic CM given high risk MDS and acute decline in EF

## 2024-08-16 NOTE — PROGRESS NOTE ADULT - PROBLEM SELECTOR PLAN 2
Pt follows with Dr. Beyer outpatient  Previously on Jakafi, Luspatercept. Latest outpt regimen includes Anagrelide, Ojjaara, Imatinib,  Evidence of TLS in past, supposed to start Allopurinol  , Uric Acid 10.6    Plan:  - hematology consulted, appreciate recs  - c/w anagrelide 0.5mg pO BID  - c/w Imatinib 400mg pO qd  - c/w Ojjaara 200mg qd, will need to bring from home  - f/u G6PD  - hold meds  -- Jakafi, Luspatercept - 1.75mg/kg q3 weeks,   -- ASA - hold while on eliquis  -- consider spleen US Pt follows with NP Kayleen outpatient  Previously on Jakafi, Luspatercept. 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

## 2024-08-16 NOTE — DISCHARGE NOTE PROVIDER - PROVIDER TOKENS
PROVIDER:[TOKEN:[[id number]],FOLLOWUP:[2 weeks],ESTABLISHEDPATIENT:[T]],PROVIDER:[TOKEN:[[id number]],FOLLOWUP:[1 week],ESTABLISHEDPATIENT:[T]] PROVIDER:[TOKEN:[[id number]],FOLLOWUP:[2 weeks],ESTABLISHEDPATIENT:[T]],PROVIDER:[TOKEN:[[id number]],FOLLOWUP:[1 week],ESTABLISHEDPATIENT:[T]],PROVIDER:[TOKEN:[[id number]],FOLLOWUP:[Routine]] PROVIDER:[TOKEN:[[id number]],FOLLOWUP:[2 weeks],ESTABLISHEDPATIENT:[T]],PROVIDER:[TOKEN:[[id number]],FOLLOWUP:[1 week],ESTABLISHEDPATIENT:[T]],FREE:[LAST:[Internal Medicine at the [facility]],PHONE:[phone]],FAX:[(   )    -],ADDRESS:[[address] [place] [address]],SCHEDULEDAPPT:[09/16/2024],SCHEDULEDAPPTTIME:[09:30 AM],ESTABLISHEDPATIENT:[T]]

## 2024-08-16 NOTE — PROGRESS NOTE ADULT - ATTENDING COMMENTS
Patient seen and examined at bedside. No acute events overnight. Seems to be diuresing well with improvement in symptoms. Cardiology recs appreciated with plans for further ischemic work up and addition of GDMT. Started Entresto. Will eventually start beta blocker. Per cardio-onc would recommend holding momelotinib which we haven't been giving anyway.    Patient is alluding to leaving AMA again today. I don't think we'll be able to keep him for much longer. Explained further work up won't be done until next week and that he needs more medical optimization.    - Continue with Diureses  - Plan for ischemic work up and optimization of GMDT  - Onc consult pending Patient seen and examined at bedside. No acute events overnight. Seems to be diuresing well with improvement in symptoms. Cardiology recs appreciated with plans for further ischemic work up and addition of GDMT. Started Entresto. Will eventually start beta blocker. Per cardio-onc would recommend holding momelotinib which we haven't been giving anyway.    Patient is alluding to leaving AMA again today. I don't think we'll be able to keep him for much longer.     - Continue with Diureses  - Plan for ischemic work up and optimization of GMDT  - Onc consult pending

## 2024-08-16 NOTE — DISCHARGE NOTE PROVIDER - DETAILS OF MALNUTRITION DIAGNOSIS/DIAGNOSES
This patient has been assessed with a concern for Malnutrition and was treated during this hospitalization for the following Nutrition diagnosis/diagnoses:     -  08/16/2024: Moderate protein-calorie malnutrition

## 2024-08-16 NOTE — DISCHARGE NOTE PROVIDER - CARE PROVIDERS DIRECT ADDRESSES
,DirectAddress_Unknown,[email or web address] ,DirectAddress_Unknown,[email or web address] ,DirectAddress_Unknown,[email or web address],DirectAddress_Unknown

## 2024-08-16 NOTE — DIETITIAN NUTRITION RISK NOTIFICATION - TREATMENT: THE FOLLOWING DIET HAS BEEN RECOMMENDED
Diet, Regular:   Consistent Carbohydrate {Evening Snack} (CSTCHOSN)  DASH/TLC {Sodium & Cholesterol Restricted} (DASH)  1000mL Fluid Restriction ([id number]) (08-14-24 @ 13:05) [Active]

## 2024-08-16 NOTE — DIETITIAN INITIAL EVALUATION ADULT - PHYSCIAL ASSESSMENT
Pt reports usual body weight 173lbs. Unsure of recent unintentional weight loss. Per electronic medical record, current body weight 161lbs (8/15). 6.9% potential weight loss indicated. HF and edema noted, diuretics in use. Weight fluctuations to be expected. [facility]

## 2024-08-16 NOTE — PROGRESS NOTE ADULT - CONVERSATION DETAILS
Patient/Caregiver provided printed discharge information. Discussed with patient he has a weak heart and may be from cancer. He does not believe he has cancer, but has a blood issue. Initially resistant to further inpatient work up, but now amenable to ischemic work up. Will have further discussion regarding code status.

## 2024-08-16 NOTE — PROGRESS NOTE ADULT - PROBLEM SELECTOR PLAN 6
DVT: eliquis 5mg BID  Diet: Diabetic, DASH, 1L restriction  Dispo: pending course, PT eval  Code Status: Full Code   GI Prophylaxis: Mylanta DVT: heparin gtt  Diet: Diabetic, DASH, 1L restriction  Dispo: pending course, PT eval  Code Status: Full Code   GI Prophylaxis: Mylanta

## 2024-08-16 NOTE — DIETITIAN INITIAL EVALUATION ADULT - ORAL INTAKE PTA/DIET HISTORY
Pt visited. Pt appearing disoriented during visit, question nutrition hx accuracy. Per Pt, reports adhering to a low sodium, healthful diet prior to admission. Pt reports preparing most meals, and good appetite prior to admission. NFKA or intolerances reported. Pt denies oral nutrition supplementation prior to admission.

## 2024-08-16 NOTE — DISCHARGE NOTE PROVIDER - NPI NUMBER (FOR SYSADMIN USE ONLY) :
[[id number] [[id number]] [[id number]],[UNKNOWN]

## 2024-08-16 NOTE — DIETITIAN INITIAL EVALUATION ADULT - NS FNS DIET ORDER
Diet, Regular:   Consistent Carbohydrate {Evening Snack} (CSTCHOSN)  DASH/TLC {Sodium & Cholesterol Restricted} (DASH)  1000mL Fluid Restriction ([id number]) (08-14-24 @ 13:05) [Active]

## 2024-08-16 NOTE — DISCHARGE NOTE PROVIDER - NSDCMRMEDTOKEN_GEN_ALL_CORE_FT
anagrelide 0.5 mg oral capsule: 1 cap(s) orally 2 times a day  canagliflozin 100 mg oral tablet: 1 tab(s) orally once a day  dorzolamide-timolol 2.23%-0.68% (2%-0.5% base) ophthalmic solution: 1 drop(s) in each eye 2 times a day  Eliquis Starter Pack for Treatment of DVT and PE 5 mg oral tablet: 2 tab(s) orally 2 times a day Take Starter pack as follows:  Take 10 mg (2 tabs) twice daily x7 days then DECREASE to 5 mg (1 tab)  twice daily thereafter  Entresto 24 mg-26 mg oral tablet: 1 tab(s) orally once a day  Farxiga 5 mg oral tablet: 1 tab(s) orally once a day  imatinib 400 mg oral tablet: 1 tab(s) orally once a day  Jardiance 10 mg oral tablet: 1 tab(s) orally once a day  latanoprost 0.005% ophthalmic solution: 1 drop(s) in each eye once a day (at bedtime)  Steglatro 5 mg oral tablet: 1 tab(s) orally once a day   anagrelide 0.5 mg oral capsule: 1 cap(s) orally 2 times a day  dorzolamide-timolol 2.23%-0.68% (2%-0.5% base) ophthalmic solution: 1 drop(s) in each eye 2 times a day  Eliquis Starter Pack for Treatment of DVT and PE 5 mg oral tablet: 2 tab(s) orally 2 times a day Take Starter pack as follows:  Take 10 mg (2 tabs) twice daily x7 days then DECREASE to 5 mg (1 tab)  twice daily thereafter  Entresto 24 mg-26 mg oral tablet: 1 tab(s) orally once a day  imatinib 400 mg oral tablet: 1 tab(s) orally once a day  latanoprost 0.005% ophthalmic solution: 1 drop(s) in each eye once a day (at bedtime)   anagrelide 0.5 mg oral capsule: 1 cap(s) orally 2 times a day  dorzolamide-timolol 2.23%-0.68% (2%-0.5% base) ophthalmic solution: 1 drop(s) in each eye 2 times a day  Eliquis 5 mg oral tablet: 1 tab(s) orally 2 times a day  Entresto 49 mg-51 mg oral tablet: 1 tab(s) orally 2 times a day  imatinib 400 mg oral tablet: 1 tab(s) orally once a day  latanoprost 0.005% ophthalmic solution: 1 drop(s) in each eye once a day (at bedtime)  Lipitor 40 mg oral tablet: 1 tab(s) orally once a day (at bedtime)  metoprolol succinate 25 mg oral tablet, extended release: 1 tab(s) orally once a day

## 2024-08-16 NOTE — DISCHARGE NOTE PROVIDER - NSDCCPCAREPLAN_GEN_ALL_CORE_FT
PRINCIPAL DISCHARGE DIAGNOSIS  Diagnosis: Heart failure  Assessment and Plan of Treatment: Congestive Heart Failure (CHF)  Congestive heart failure is a chronic condition in which the heart has trouble pumping blood. In some cases of heart failure, fluid may back up into your lungs or you may have swelling (edema) in your lower legs. There are many causes of heart failure including high blood pressure, coronary artery disease, abnormal heart valves, heart muscle disease, lung disease, diabetes, etc. Symptoms include shortness of breath with activity or when lying flat, cough, swelling of the legs, fatigue, or increased urination during the night.   Treatment is aimed at managing the symptoms of heart failure and may include lifestyle changes, medications, or surgical procedures. Take medicines only as directed by your health care provider and do not stop unless instructed to do so. Eat heart-healthy foods with low or no trans/saturated fats, cholesterol and salt. Weigh yourself every day for early recognition of fluid accumulation.  SEEK IMMEDIATE MEDICAL CARE IF YOU HAVE ANY OF THE FOLLOWING SYMPTOMS: shortness of breath, change in mental status, chest pain, lightheadedness/dizziness/fainting, or worsening of symptoms including not being able to conduct normal physical activity.  MEDICATION CHANGES:  Please take ENTRESTO 24/26 two times a day  Please take Metoprolol 12.5 two times a day  STOP taking Ojjarro     PRINCIPAL DISCHARGE DIAGNOSIS  Diagnosis: Heart failure  Assessment and Plan of Treatment: Congestive Heart Failure (CHF)  Congestive heart failure is a chronic condition in which the heart has trouble pumping blood. In some cases of heart failure, fluid may back up into your lungs or you may have swelling (edema) in your lower legs. There are many causes of heart failure including high blood pressure, coronary artery disease, abnormal heart valves, heart muscle disease, lung disease, diabetes, etc. Symptoms include shortness of breath with activity or when lying flat, cough, swelling of the legs, fatigue, or increased urination during the night.   Treatment is aimed at managing the symptoms of heart failure and may include lifestyle changes, medications, or surgical procedures. Take medicines only as directed by your health care provider and do not stop unless instructed to do so. Eat heart-healthy foods with low or no trans/saturated fats, cholesterol and salt. Weigh yourself every day for early recognition of fluid accumulation.  SEEK IMMEDIATE MEDICAL CARE IF YOU HAVE ANY OF THE FOLLOWING SYMPTOMS: shortness of breath, change in mental status, chest pain, lightheadedness/dizziness/fainting, or worsening of symptoms including not being able to conduct normal physical activity.  MEDICATION CHANGES:  Please take ENTRESTO 24/26 two times a day  Please take Metoprolol 12.5 two times a day  STOP taking ojjaara     PRINCIPAL DISCHARGE DIAGNOSIS  Diagnosis: Heart failure  Assessment and Plan of Treatment: Congestive Heart Failure (CHF)  Congestive heart failure is a chronic condition in which the heart has trouble pumping blood. In some cases of heart failure, fluid may back up into your lungs or you may have swelling (edema) in your lower legs. There are many causes of heart failure including high blood pressure, coronary artery disease, abnormal heart valves, heart muscle disease, lung disease, diabetes, etc. Symptoms include shortness of breath with activity or when lying flat, cough, swelling of the legs, fatigue, or increased urination during the night.   Treatment is aimed at managing the symptoms of heart failure and may include lifestyle changes, medications, or surgical procedures. Take medicines only as directed by your health care provider and do not stop unless instructed to do so. Eat heart-healthy foods with low or no trans/saturated fats, cholesterol and salt. Weigh yourself every day for early recognition of fluid accumulation.  SEEK IMMEDIATE MEDICAL CARE IF YOU HAVE ANY OF THE FOLLOWING SYMPTOMS: shortness of breath, change in mental status, chest pain, lightheadedness/dizziness/fainting, or worsening of symptoms including not being able to conduct normal physical activity.  MEDICATION CHANGES:  Please take ENTRESTO 48/52 two times a day  Please take Metoprolol Succinate 25 one time a day  STOP taking ojjaara

## 2024-08-17 LAB
ALBUMIN SERPL ELPH-MCNC: 3.3 G/DL — SIGNIFICANT CHANGE UP (ref 3.3–5)
ALP SERPL-CCNC: 82 U/L — SIGNIFICANT CHANGE UP (ref 40–120)
ALT FLD-CCNC: 35 U/L — SIGNIFICANT CHANGE UP (ref 10–45)
ANION GAP SERPL CALC-SCNC: 13 MMOL/L — SIGNIFICANT CHANGE UP (ref 5–17)
APTT BLD: 39.3 SEC — HIGH (ref 24.5–35.6)
AST SERPL-CCNC: 23 U/L — SIGNIFICANT CHANGE UP (ref 10–40)
BASOPHILS # BLD AUTO: 0 K/UL — SIGNIFICANT CHANGE UP (ref 0–0.2)
BASOPHILS NFR BLD AUTO: 0 % — SIGNIFICANT CHANGE UP (ref 0–2)
BILIRUB SERPL-MCNC: 0.7 MG/DL — SIGNIFICANT CHANGE UP (ref 0.2–1.2)
BLASTS # FLD: 1.8 % — HIGH (ref 0–0)
BUN SERPL-MCNC: 24 MG/DL — HIGH (ref 7–23)
CALCIUM SERPL-MCNC: 8.6 MG/DL — SIGNIFICANT CHANGE UP (ref 8.4–10.5)
CHLORIDE SERPL-SCNC: 104 MMOL/L — SIGNIFICANT CHANGE UP (ref 96–108)
CO2 SERPL-SCNC: 23 MMOL/L — SIGNIFICANT CHANGE UP (ref 22–31)
CREAT SERPL-MCNC: 0.98 MG/DL — SIGNIFICANT CHANGE UP (ref 0.5–1.3)
EGFR: 80 ML/MIN/1.73M2 — SIGNIFICANT CHANGE UP
EOSINOPHIL # BLD AUTO: 0.62 K/UL — HIGH (ref 0–0.5)
EOSINOPHIL NFR BLD AUTO: 7.1 % — HIGH (ref 0–6)
GIANT PLATELETS BLD QL SMEAR: PRESENT — SIGNIFICANT CHANGE UP
GLUCOSE SERPL-MCNC: 172 MG/DL — HIGH (ref 70–99)
HCT VFR BLD CALC: 41.4 % — SIGNIFICANT CHANGE UP (ref 39–50)
HGB BLD-MCNC: 12.6 G/DL — LOW (ref 13–17)
INR BLD: 1.66 RATIO — HIGH (ref 0.85–1.18)
LDH SERPL L TO P-CCNC: 665 U/L — HIGH (ref 50–242)
LYMPHOCYTES # BLD AUTO: 0.86 K/UL — LOW (ref 1–3.3)
LYMPHOCYTES # BLD AUTO: 9.8 % — LOW (ref 13–44)
MAGNESIUM SERPL-MCNC: 1.9 MG/DL — SIGNIFICANT CHANGE UP (ref 1.6–2.6)
MANUAL SMEAR VERIFICATION: SIGNIFICANT CHANGE UP
MCHC RBC-ENTMCNC: 25.3 PG — LOW (ref 27–34)
MCHC RBC-ENTMCNC: 30.4 GM/DL — LOW (ref 32–36)
MCV RBC AUTO: 83.1 FL — SIGNIFICANT CHANGE UP (ref 80–100)
METAMYELOCYTES # FLD: 0.9 % — HIGH (ref 0–0)
MONOCYTES # BLD AUTO: 0.32 K/UL — SIGNIFICANT CHANGE UP (ref 0–0.9)
MONOCYTES NFR BLD AUTO: 3.6 % — SIGNIFICANT CHANGE UP (ref 2–14)
NEUTROPHILS # BLD AUTO: 6.74 K/UL — SIGNIFICANT CHANGE UP (ref 1.8–7.4)
NEUTROPHILS NFR BLD AUTO: 73.2 % — SIGNIFICANT CHANGE UP (ref 43–77)
NEUTS BAND # BLD: 3.6 % — SIGNIFICANT CHANGE UP (ref 0–8)
NRBC # BLD: 15 /100 WBCS — HIGH (ref 0–0)
PHOSPHATE SERPL-MCNC: 4 MG/DL — SIGNIFICANT CHANGE UP (ref 2.5–4.5)
PLAT MORPH BLD: NORMAL — SIGNIFICANT CHANGE UP
PLATELET # BLD AUTO: 530 K/UL — HIGH (ref 150–400)
POTASSIUM SERPL-MCNC: 4.3 MMOL/L — SIGNIFICANT CHANGE UP (ref 3.5–5.3)
POTASSIUM SERPL-SCNC: 4.3 MMOL/L — SIGNIFICANT CHANGE UP (ref 3.5–5.3)
PROT SERPL-MCNC: 6 G/DL — SIGNIFICANT CHANGE UP (ref 6–8.3)
PROTHROM AB SERPL-ACNC: 17.2 SEC — HIGH (ref 9.5–13)
RBC # BLD: 4.98 M/UL — SIGNIFICANT CHANGE UP (ref 4.2–5.8)
RBC # FLD: 30.6 % — HIGH (ref 10.3–14.5)
RBC BLD AUTO: SIGNIFICANT CHANGE UP
SMUDGE CELLS # BLD: PRESENT — SIGNIFICANT CHANGE UP
SODIUM SERPL-SCNC: 140 MMOL/L — SIGNIFICANT CHANGE UP (ref 135–145)
URATE SERPL-MCNC: 9.7 MG/DL — HIGH (ref 3.4–8.8)
WBC # BLD: 8.78 K/UL — SIGNIFICANT CHANGE UP (ref 3.8–10.5)
WBC # FLD AUTO: 8.78 K/UL — SIGNIFICANT CHANGE UP (ref 3.8–10.5)

## 2024-08-17 PROCEDURE — 99233 SBSQ HOSP IP/OBS HIGH 50: CPT | Mod: GC

## 2024-08-17 RX ORDER — HEPARIN SODIUM,BOVINE 1000/ML
6000 VIAL (ML) INJECTION EVERY 6 HOURS
Refills: 0 | Status: DISCONTINUED | OUTPATIENT
Start: 2024-08-17 | End: 2024-08-17

## 2024-08-17 RX ORDER — HEPARIN SODIUM,BOVINE 1000/ML
VIAL (ML) INJECTION
Qty: 25000 | Refills: 0 | Status: DISCONTINUED | OUTPATIENT
Start: 2024-08-17 | End: 2024-08-17

## 2024-08-17 RX ORDER — ENOXAPARIN SODIUM 100 MG/ML
70 INJECTION SUBCUTANEOUS ONCE
Refills: 0 | Status: DISCONTINUED | OUTPATIENT
Start: 2024-08-17 | End: 2024-08-17

## 2024-08-17 RX ORDER — HEPARIN SODIUM,BOVINE 1000/ML
3000 VIAL (ML) INJECTION EVERY 6 HOURS
Refills: 0 | Status: DISCONTINUED | OUTPATIENT
Start: 2024-08-17 | End: 2024-08-17

## 2024-08-17 RX ORDER — METOPROLOL TARTRATE 100 MG/1
25 TABLET ORAL DAILY
Refills: 0 | Status: DISCONTINUED | OUTPATIENT
Start: 2024-08-17 | End: 2024-08-19

## 2024-08-17 RX ADMIN — METOPROLOL TARTRATE 25 MILLIGRAM(S): 100 TABLET ORAL at 12:02

## 2024-08-17 RX ADMIN — ANAGRELIDE 0.5 MILLIGRAM(S): 0.5 CAPSULE ORAL at 17:39

## 2024-08-17 RX ADMIN — Medication 40 MILLIGRAM(S): at 21:35

## 2024-08-17 RX ADMIN — SACUBITRIL AND VALSARTAN 1 TABLET(S): 49; 51 TABLET, FILM COATED ORAL at 17:39

## 2024-08-17 RX ADMIN — IMATINIB MESYLATE 400 MILLIGRAM(S): 100 TABLET, FILM COATED ORAL at 12:02

## 2024-08-17 RX ADMIN — ANAGRELIDE 0.5 MILLIGRAM(S): 0.5 CAPSULE ORAL at 08:18

## 2024-08-17 RX ADMIN — SACUBITRIL AND VALSARTAN 1 TABLET(S): 49; 51 TABLET, FILM COATED ORAL at 08:18

## 2024-08-17 RX ADMIN — Medication 1 DROP(S): at 17:40

## 2024-08-17 RX ADMIN — Medication 3 MILLIGRAM(S): at 21:35

## 2024-08-17 RX ADMIN — LATANOPROST 1 DROP(S): 50 SOLUTION OPHTHALMIC at 21:35

## 2024-08-17 RX ADMIN — ENOXAPARIN SODIUM 70 MILLIGRAM(S): 100 INJECTION SUBCUTANEOUS at 05:29

## 2024-08-17 RX ADMIN — Medication 1 DROP(S): at 05:30

## 2024-08-17 RX ADMIN — Medication 40 MILLIGRAM(S): at 05:29

## 2024-08-17 RX ADMIN — Medication 40 MILLIGRAM(S): at 12:02

## 2024-08-17 NOTE — PROGRESS NOTE ADULT - PROBLEM SELECTOR PLAN 1
Pt with jimenez, orthopnea, b/l LE pitting edema, elevated BNP  h/o treatment with immunotherapy with possible cardiotoxicity  CXR (8/14): Mild basal congestion with trace effusions left basilar atelectasis..  Lipid Panel wnl  TTE (8/14): EF20-25%    Plan:  - cardiology following, appreciate recs  - IV lasix 40mg BID  - Started entresto 24/26 BID  - Will start metoprolol tartrate 12.5BID tomorrow  - pending [facility]  - monitor lytes  - strict I/Os  - standing weights  - tele  - fluid restriction 1L Pt with jimenez, orthopnea, b/l LE pitting edema, elevated BNP  h/o treatment with immunotherapy with possible cardiotoxicity  CXR (8/14): Mild basal congestion with trace effusions left basilar atelectasis..  Lipid Panel wnl  TTE (8/14): EF20-25%    Plan:  - cardiology following, appreciate recs - possible cath tomorrow vs Monday  - IV lasix 40mg BID  - Started entresto 24/26 BID  - Metoprolol 25 BID started 8/17  - pending [facility]  - monitor lytes  - strict I/Os  - standing weights  - tele  - fluid restriction 1L

## 2024-08-17 NOTE — PROGRESS NOTE ADULT - ATTENDING COMMENTS
Patient seen and examined at bedside. No acute events overnight. Continue with diuresis. Added metoprolol for GDMT. Defer heparin drip due to patient's lack of compliance with PTT. Will hold AM Lovenox and follow up with cardiology re: [facility].

## 2024-08-17 NOTE — PROGRESS NOTE ADULT - PROBLEM SELECTOR PLAN 6
DVT: heparin gtt  Diet: Diabetic, DASH, 1L restriction  Dispo: pending course, PT eval  Code Status: Full Code   GI Prophylaxis: Mylanta DVT: Lovenox  Diet: Diabetic, DASH, 1L restriction  Dispo: pending course, PT eval  Code Status: Full Code   GI Prophylaxis: Mylanta

## 2024-08-17 NOTE — PROGRESS NOTE ADULT - SUBJECTIVE AND OBJECTIVE BOX
Patient is a 74y old  Male who presents with a chief complaint of Shortness of Breath, Chest Pain (16 Aug 2024 18:01)      SUBJECTIVE / OVERNIGHT EVENTS:    MEDICATIONS  (STANDING):  anagrelide 0.5 milliGRAM(s) Oral <User Schedule>  atorvastatin 40 milliGRAM(s) Oral at bedtime  dorzolamide 2%/timolol 0.5% Ophthalmic Solution 1 Drop(s) Both EYES two times a day  enoxaparin Injectable 70 milliGRAM(s) SubCutaneous every 12 hours  furosemide   Injectable 40 milliGRAM(s) IV Push two times a day  imatinib 400 milliGRAM(s) Oral daily  latanoprost 0.005% Ophthalmic Solution 1 Drop(s) Both EYES at bedtime  sacubitril 24 mG/valsartan 26 mG 1 Tablet(s) Oral two times a day    MEDICATIONS  (PRN):  acetaminophen     Tablet .. 650 milliGRAM(s) Oral every 6 hours PRN Temp greater or equal to 38C (100.4F), Mild Pain (1 - 3)  aluminum hydroxide/magnesium hydroxide/simethicone Suspension 30 milliLiter(s) Oral every 4 hours PRN Dyspepsia  melatonin 3 milliGRAM(s) Oral at bedtime PRN Insomnia  ondansetron Injectable 4 milliGRAM(s) IV Push every 8 hours PRN Nausea and/or Vomiting      CAPILLARY BLOOD GLUCOSE        I&O's Summary    16 Aug 2024 07:01  -  17 Aug 2024 07:00  --------------------------------------------------------  IN: 360 mL / OUT: 2720 mL / NET: -2360 mL        Vital Signs Last 24 Hrs  T(C): 36.5 (17 Aug 2024 05:38), Max: 37.2 (16 Aug 2024 19:47)  T(F): 97.7 (17 Aug 2024 05:38), Max: 98.9 (16 Aug 2024 19:47)  HR: 75 (17 Aug 2024 05:38) (72 - 79)  BP: 116/77 (17 Aug 2024 05:38) (101/61 - 116/77)  BP(mean): --  RR: 18 (17 Aug 2024 05:38) (18 - 18)  SpO2: 96% (17 Aug 2024 05:38) (96% - 98%)    Parameters below as of 17 Aug 2024 05:38  Patient On (Oxygen Delivery Method): room air        PHYSICAL EXAM:  GENERAL: NAD, well-developed, well-nourished  HEAD: Atraumatic, Normocephalic  EYES: EOMI, PERRLA, conjunctiva and sclera clear  NECK: Supple, No JVD  CHEST/LUNG: Clear to auscultation bilaterally; No wheezes or crackles  HEART: Normal S1/S2; Regular rate and rhythm; No murmurs, rubs, or gallops  ABDOMEN: Soft, Nontender, Nondistended; Bowel sounds present  EXTREMITIES: 2+ Peripheral Pulses; No clubbing, cyanosis, or edema  PSYCH: A&Ox3  NEUROLOGY: no focal neurologic deficit  SKIN: No rashes or lesions    LABS:                        12.6   8.78  )-----------( 530      ( 17 Aug 2024 06:14 )             41.4      08-17    140  |  104  |  24<H>  ----------------------------<  172<H>  4.3   |  23  |  0.98    Ca    8.6      17 Aug 2024 06:14  Phos  4.0     08-17  Mg     1.9     08-17    TPro  6.0  /  Alb  3.3  /  TBili  0.7  /  DBili  x   /  AST  23  /  ALT  35  /  AlkPhos  82  08-17    PT/INR - ( 17 Aug 2024 06:14 )   PT: 17.2 sec;   INR: 1.66 ratio         PTT - ( 17 Aug 2024 06:14 )  PTT:39.3 sec      Urinalysis Basic - ( 17 Aug 2024 06:14 )    Color: x / Appearance: x / SG: x / pH: x  Gluc: 172 mg/dL / Ketone: x  / Bili: x / Urobili: x   Blood: x / Protein: x / Nitrite: x   Leuk Esterase: x / RBC: x / WBC x   Sq Epi: x / Non Sq Epi: x / Bacteria: x        RADIOLOGY & ADDITIONAL TESTS:    Imaging Personally Reviewed:    Consultant(s) Notes Reviewed:      Care Discussed with Consultants/Other Providers:   Patient is a 74y old  Male who presents with a chief complaint of Shortness of Breath, Chest Pain (16 Aug 2024 18:01)      SUBJECTIVE / OVERNIGHT EVENTS: No acute events overnight. Patient seen and examined at bedside, walked around unit 4 times yesterday with no shortness of breath. Was not able to fall asleep overnight. Notes significant UOP. Denies pain or other complaints.     MEDICATIONS  (STANDING):  anagrelide 0.5 milliGRAM(s) Oral <User Schedule>  atorvastatin 40 milliGRAM(s) Oral at bedtime  dorzolamide 2%/timolol 0.5% Ophthalmic Solution 1 Drop(s) Both EYES two times a day  enoxaparin Injectable 70 milliGRAM(s) SubCutaneous every 12 hours  furosemide   Injectable 40 milliGRAM(s) IV Push two times a day  imatinib 400 milliGRAM(s) Oral daily  latanoprost 0.005% Ophthalmic Solution 1 Drop(s) Both EYES at bedtime  sacubitril 24 mG/valsartan 26 mG 1 Tablet(s) Oral two times a day    MEDICATIONS  (PRN):  acetaminophen     Tablet .. 650 milliGRAM(s) Oral every 6 hours PRN Temp greater or equal to 38C (100.4F), Mild Pain (1 - 3)  aluminum hydroxide/magnesium hydroxide/simethicone Suspension 30 milliLiter(s) Oral every 4 hours PRN Dyspepsia  melatonin 3 milliGRAM(s) Oral at bedtime PRN Insomnia  ondansetron Injectable 4 milliGRAM(s) IV Push every 8 hours PRN Nausea and/or Vomiting      CAPILLARY BLOOD GLUCOSE        I&O's Summary    16 Aug 2024 07:01  -  17 Aug 2024 07:00  --------------------------------------------------------  IN: 360 mL / OUT: 2720 mL / NET: -2360 mL        Vital Signs Last 24 Hrs  T(C): 36.5 (17 Aug 2024 05:38), Max: 37.2 (16 Aug 2024 19:47)  T(F): 97.7 (17 Aug 2024 05:38), Max: 98.9 (16 Aug 2024 19:47)  HR: 75 (17 Aug 2024 05:38) (72 - 79)  BP: 116/77 (17 Aug 2024 05:38) (101/61 - 116/77)  BP(mean): --  RR: 18 (17 Aug 2024 05:38) (18 - 18)  SpO2: 96% (17 Aug 2024 05:38) (96% - 98%)    Parameters below as of 17 Aug 2024 05:38  Patient On (Oxygen Delivery Method): room air        PHYSICAL EXAM:  General: NAD  CV: RRR, normal S1 and S2, no m/r/g  Lungs: normal respiratory effort. b/l basilar coarse breath sounds  Abd: soft, nontender, nondistended, BSx4  Ext: +1 LE edema, 2+ peripheral pulses   Pysch: AAOx3     LABS:                        12.6   8.78  )-----------( 530      ( 17 Aug 2024 06:14 )             41.4      08-17    140  |  104  |  24<H>  ----------------------------<  172<H>  4.3   |  23  |  0.98    Ca    8.6      17 Aug 2024 06:14  Phos  4.0     08-17  Mg     1.9     08-17    TPro  6.0  /  Alb  3.3  /  TBili  0.7  /  DBili  x   /  AST  23  /  ALT  35  /  AlkPhos  82  08-17    PT/INR - ( 17 Aug 2024 06:14 )   PT: 17.2 sec;   INR: 1.66 ratio         PTT - ( 17 Aug 2024 06:14 )  PTT:39.3 sec      Urinalysis Basic - ( 17 Aug 2024 06:14 )    Color: x / Appearance: x / SG: x / pH: x  Gluc: 172 mg/dL / Ketone: x  / Bili: x / Urobili: x   Blood: x / Protein: x / Nitrite: x   Leuk Esterase: x / RBC: x / WBC x   Sq Epi: x / Non Sq Epi: x / Bacteria: x        RADIOLOGY & ADDITIONAL TESTS:    Imaging Personally Reviewed:    Consultant(s) Notes Reviewed:      Care Discussed with Consultants/Other Providers:

## 2024-08-17 NOTE — PROGRESS NOTE ADULT - PROBLEM SELECTOR PLAN 3
CTPE (05/2024): Pt with Acute bilateral pulmonary emboli within the subsegmental arteries of all lobes. No evidence of right heart strain or pulmonary infarct.  CTPE (08/14): No pulmonary embolus through the segmental branches. Mildinterstitial edema and small pleural effusions.  Was on eliquis starter pack, pt was taking 5mg qd, noticed having oral bleeding. Unclear how compliant patient was with eliquis    Plan:  - started on heparin gtt, anticipation of LHC  - hold Eliquis 5mg BID CTPE (05/2024): Pt with Acute bilateral pulmonary emboli within the subsegmental arteries of all lobes. No evidence of right heart strain or pulmonary infarct.  CTPE (08/14): No pulmonary embolus through the segmental branches. Mildinterstitial edema and small pleural effusions.  Was on eliquis starter pack, pt was taking 5mg qd, noticed having oral bleeding. Unclear how compliant patient was with eliquis    Plan:  - started on heparin gtt, anticipation of LHC - > transitioned to lovenox BID per cards recs  - hold Eliquis 5mg BID

## 2024-08-17 NOTE — PROGRESS NOTE ADULT - PROBLEM SELECTOR PLAN 2
Pt follows with NP Kayleen outpatient  Previously on Jakafi, Luspatercept. 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

## 2024-08-18 LAB
ALBUMIN SERPL ELPH-MCNC: 3.4 G/DL — SIGNIFICANT CHANGE UP (ref 3.3–5)
ALP SERPL-CCNC: 90 U/L — SIGNIFICANT CHANGE UP (ref 40–120)
ALT FLD-CCNC: 33 U/L — SIGNIFICANT CHANGE UP (ref 10–45)
ANION GAP SERPL CALC-SCNC: 10 MMOL/L — SIGNIFICANT CHANGE UP (ref 5–17)
APTT BLD: 35.9 SEC — HIGH (ref 24.5–35.6)
AST SERPL-CCNC: 24 U/L — SIGNIFICANT CHANGE UP (ref 10–40)
BASOPHILS # BLD AUTO: 0.33 K/UL — HIGH (ref 0–0.2)
BASOPHILS NFR BLD AUTO: 3.6 % — HIGH (ref 0–2)
BILIRUB SERPL-MCNC: 0.7 MG/DL — SIGNIFICANT CHANGE UP (ref 0.2–1.2)
BLD GP AB SCN SERPL QL: NEGATIVE — SIGNIFICANT CHANGE UP
BUN SERPL-MCNC: 24 MG/DL — HIGH (ref 7–23)
CALCIUM SERPL-MCNC: 9 MG/DL — SIGNIFICANT CHANGE UP (ref 8.4–10.5)
CHLORIDE SERPL-SCNC: 102 MMOL/L — SIGNIFICANT CHANGE UP (ref 96–108)
CO2 SERPL-SCNC: 23 MMOL/L — SIGNIFICANT CHANGE UP (ref 22–31)
CREAT SERPL-MCNC: 1.15 MG/DL — SIGNIFICANT CHANGE UP (ref 0.5–1.3)
EGFR: 66 ML/MIN/1.73M2 — SIGNIFICANT CHANGE UP
EOSINOPHIL # BLD AUTO: 0.22 K/UL — SIGNIFICANT CHANGE UP (ref 0–0.5)
EOSINOPHIL NFR BLD AUTO: 2.4 % — SIGNIFICANT CHANGE UP (ref 0–6)
GLUCOSE SERPL-MCNC: 206 MG/DL — HIGH (ref 70–99)
HCT VFR BLD CALC: 43.8 % — SIGNIFICANT CHANGE UP (ref 39–50)
HGB BLD-MCNC: 13.2 G/DL — SIGNIFICANT CHANGE UP (ref 13–17)
HGB BLDA-MCNC: 12.2 G/DL — LOW (ref 12.6–17.4)
IMM GRANULOCYTES NFR BLD AUTO: 2.2 % — HIGH (ref 0–0.9)
INR BLD: 1.35 RATIO — HIGH (ref 0.85–1.18)
LYMPHOCYTES # BLD AUTO: 2.07 K/UL — SIGNIFICANT CHANGE UP (ref 1–3.3)
LYMPHOCYTES # BLD AUTO: 22.8 % — SIGNIFICANT CHANGE UP (ref 13–44)
MAGNESIUM SERPL-MCNC: 2 MG/DL — SIGNIFICANT CHANGE UP (ref 1.6–2.6)
MCHC RBC-ENTMCNC: 25.3 PG — LOW (ref 27–34)
MCHC RBC-ENTMCNC: 30.1 GM/DL — LOW (ref 32–36)
MCV RBC AUTO: 84.1 FL — SIGNIFICANT CHANGE UP (ref 80–100)
MONOCYTES # BLD AUTO: 0.88 K/UL — SIGNIFICANT CHANGE UP (ref 0–0.9)
MONOCYTES NFR BLD AUTO: 9.7 % — SIGNIFICANT CHANGE UP (ref 2–14)
NEUTROPHILS # BLD AUTO: 5.37 K/UL — SIGNIFICANT CHANGE UP (ref 1.8–7.4)
NEUTROPHILS NFR BLD AUTO: 59.3 % — SIGNIFICANT CHANGE UP (ref 43–77)
NRBC # BLD: 6 /100 WBCS — HIGH (ref 0–0)
OXYHGB MFR BLDA: 93.1 % — SIGNIFICANT CHANGE UP (ref 90–95)
PHOSPHATE SERPL-MCNC: 4.1 MG/DL — SIGNIFICANT CHANGE UP (ref 2.5–4.5)
PLATELET # BLD AUTO: 473 K/UL — HIGH (ref 150–400)
POTASSIUM SERPL-MCNC: 4.8 MMOL/L — SIGNIFICANT CHANGE UP (ref 3.5–5.3)
POTASSIUM SERPL-SCNC: 4.8 MMOL/L — SIGNIFICANT CHANGE UP (ref 3.5–5.3)
PROT SERPL-MCNC: 6.5 G/DL — SIGNIFICANT CHANGE UP (ref 6–8.3)
PROTHROM AB SERPL-ACNC: 14.7 SEC — HIGH (ref 9.5–13)
RBC # BLD: 5.21 M/UL — SIGNIFICANT CHANGE UP (ref 4.2–5.8)
RBC # FLD: 30.7 % — HIGH (ref 10.3–14.5)
RH IG SCN BLD-IMP: POSITIVE — SIGNIFICANT CHANGE UP
SAO2 % BLDA: 95.2 % — SIGNIFICANT CHANGE UP (ref 94–98)
SODIUM SERPL-SCNC: 135 MMOL/L — SIGNIFICANT CHANGE UP (ref 135–145)
WBC # BLD: 9.07 K/UL — SIGNIFICANT CHANGE UP (ref 3.8–10.5)
WBC # FLD AUTO: 9.07 K/UL — SIGNIFICANT CHANGE UP (ref 3.8–10.5)

## 2024-08-18 PROCEDURE — 99233 SBSQ HOSP IP/OBS HIGH 50: CPT | Mod: GC

## 2024-08-18 PROCEDURE — 99152 MOD SED SAME PHYS/QHP 5/>YRS: CPT

## 2024-08-18 PROCEDURE — 93456 R HRT CORONARY ARTERY ANGIO: CPT | Mod: 26

## 2024-08-18 RX ORDER — APIXABAN 5 MG/1
5 TABLET, FILM COATED ORAL EVERY 12 HOURS
Refills: 0 | Status: DISCONTINUED | OUTPATIENT
Start: 2024-08-18 | End: 2024-08-19

## 2024-08-18 RX ADMIN — Medication 1 DROP(S): at 17:33

## 2024-08-18 RX ADMIN — IMATINIB MESYLATE 400 MILLIGRAM(S): 100 TABLET, FILM COATED ORAL at 13:29

## 2024-08-18 RX ADMIN — Medication 40 MILLIGRAM(S): at 21:45

## 2024-08-18 RX ADMIN — SACUBITRIL AND VALSARTAN 1 TABLET(S): 49; 51 TABLET, FILM COATED ORAL at 05:32

## 2024-08-18 RX ADMIN — SACUBITRIL AND VALSARTAN 1 TABLET(S): 49; 51 TABLET, FILM COATED ORAL at 17:32

## 2024-08-18 RX ADMIN — APIXABAN 5 MILLIGRAM(S): 5 TABLET, FILM COATED ORAL at 21:45

## 2024-08-18 RX ADMIN — Medication 1 DROP(S): at 05:34

## 2024-08-18 RX ADMIN — ANAGRELIDE 0.5 MILLIGRAM(S): 0.5 CAPSULE ORAL at 17:32

## 2024-08-18 RX ADMIN — METOPROLOL TARTRATE 25 MILLIGRAM(S): 100 TABLET ORAL at 05:32

## 2024-08-18 RX ADMIN — Medication 40 MILLIGRAM(S): at 05:32

## 2024-08-18 RX ADMIN — Medication 3 MILLIGRAM(S): at 21:45

## 2024-08-18 RX ADMIN — LATANOPROST 1 DROP(S): 50 SOLUTION OPHTHALMIC at 21:46

## 2024-08-18 RX ADMIN — ANAGRELIDE 0.5 MILLIGRAM(S): 0.5 CAPSULE ORAL at 05:32

## 2024-08-18 NOTE — PROGRESS NOTE ADULT - SUBJECTIVE AND OBJECTIVE BOX
Progress Note    08-14-24 (4d)    Patient is a 74y old  Male who presents with a chief complaint of Shortness of Breath, Chest Pain (17 Aug 2024 07:00)      Subjective / Overnight Events :  - No acute events overnight.  - Pt seen and examined at bedside.     Additional ROS (if any):    MEDICATIONS  (STANDING):  anagrelide 0.5 milliGRAM(s) Oral <User Schedule>  atorvastatin 40 milliGRAM(s) Oral at bedtime  dorzolamide 2%/timolol 0.5% Ophthalmic Solution 1 Drop(s) Both EYES two times a day  furosemide   Injectable 40 milliGRAM(s) IV Push two times a day  imatinib 400 milliGRAM(s) Oral daily  latanoprost 0.005% Ophthalmic Solution 1 Drop(s) Both EYES at bedtime  melatonin 3 milliGRAM(s) Oral at bedtime  metoprolol succinate ER 25 milliGRAM(s) Oral daily  sacubitril 24 mG/valsartan 26 mG 1 Tablet(s) Oral two times a day    MEDICATIONS  (PRN):  acetaminophen     Tablet .. 650 milliGRAM(s) Oral every 6 hours PRN Temp greater or equal to 38C (100.4F), Mild Pain (1 - 3)  aluminum hydroxide/magnesium hydroxide/simethicone Suspension 30 milliLiter(s) Oral every 4 hours PRN Dyspepsia  ondansetron Injectable 4 milliGRAM(s) IV Push every 8 hours PRN Nausea and/or Vomiting          PHYSICAL EXAM:  Vital Signs Last 24 Hrs  T(C): 36.7 (18 Aug 2024 05:27), Max: 37.1 (17 Aug 2024 19:53)  T(F): 98 (18 Aug 2024 05:27), Max: 98.7 (17 Aug 2024 19:53)  HR: 75 (18 Aug 2024 05:27) (75 - 95)  BP: 110/70 (18 Aug 2024 05:27) (105/67 - 125/69)  BP(mean): --  RR: 18 (18 Aug 2024 05:27) (18 - 18)  SpO2: 98% (18 Aug 2024 05:27) (96% - 98%)    Parameters below as of 18 Aug 2024 05:27  Patient On (Oxygen Delivery Method): room air        I&O's Summary    16 Aug 2024 07:01  -  17 Aug 2024 07:00  --------------------------------------------------------  IN: 360 mL / OUT: 2720 mL / NET: -2360 mL    17 Aug 2024 07:01  -  18 Aug 2024 06:55  --------------------------------------------------------  IN: 360 mL / OUT: 2650 mL / NET: -2290 mL        General: NAD  HEENT: PERRLA, EOMi, no scleral icterus  CV: RRR, normal S1 and S2, no m/r/g  Lungs: normal respiratory effort. CTAB, no wheezes, rales, or rhonchi  Abd: soft, nontender, nondistended, BSx4  Ext: no edema, 2+ peripheral pulses   Pysch: AAOx3  Neuro: grossly non-focal, moving all extremities spontaneously   Skin: no rashes or lesions     LABS:  CAPILLARY BLOOD GLUCOSE                                  13.2   9.07  )-----------( 473      ( 18 Aug 2024 05:44 )             43.8       WBC Trend: 9.07<--, 8.78<--, 7.55<--  Hb Trend: 13.2<--, 12.6<--, 11.6<--, 13.0<--, 13.3<--    08-18    135  |  102  |  24<H>  ----------------------------<  206<H>  4.8   |  23  |  1.15    Ca    9.0      18 Aug 2024 05:44  Phos  4.1     08-18  Mg     2.0     08-18    TPro  6.5  /  Alb  3.4  /  TBili  0.7  /  DBili  x   /  AST  24  /  ALT  33  /  AlkPhos  90  08-18    PT/INR - ( 18 Aug 2024 05:44 )   PT: 14.7 sec;   INR: 1.35 ratio         PTT - ( 18 Aug 2024 05:44 )  PTT:35.9 sec      Urinalysis Basic - ( 18 Aug 2024 05:44 )    Color: x / Appearance: x / SG: x / pH: x  Gluc: 206 mg/dL / Ketone: x  / Bili: x / Urobili: x   Blood: x / Protein: x / Nitrite: x   Leuk Esterase: x / RBC: x / WBC x   Sq Epi: x / Non Sq Epi: x / Bacteria: x            RADIOLOGY & ADDITIONAL TESTS: Reviewed Progress Note    08-14-24 (4d)    Patient is a 74y old  Male who presents with a chief complaint of Shortness of Breath, Chest Pain (17 Aug 2024 07:00)      Subjective / Overnight Events :  - No acute events overnight.  - Pt seen and examined at bedside.   - Pt feeling well, no acute concerns  - Slept well last night, no sob, denies cp    Additional ROS (if any):    MEDICATIONS  (STANDING):  anagrelide 0.5 milliGRAM(s) Oral <User Schedule>  atorvastatin 40 milliGRAM(s) Oral at bedtime  dorzolamide 2%/timolol 0.5% Ophthalmic Solution 1 Drop(s) Both EYES two times a day  furosemide   Injectable 40 milliGRAM(s) IV Push two times a day  imatinib 400 milliGRAM(s) Oral daily  latanoprost 0.005% Ophthalmic Solution 1 Drop(s) Both EYES at bedtime  melatonin 3 milliGRAM(s) Oral at bedtime  metoprolol succinate ER 25 milliGRAM(s) Oral daily  sacubitril 24 mG/valsartan 26 mG 1 Tablet(s) Oral two times a day    MEDICATIONS  (PRN):  acetaminophen     Tablet .. 650 milliGRAM(s) Oral every 6 hours PRN Temp greater or equal to 38C (100.4F), Mild Pain (1 - 3)  aluminum hydroxide/magnesium hydroxide/simethicone Suspension 30 milliLiter(s) Oral every 4 hours PRN Dyspepsia  ondansetron Injectable 4 milliGRAM(s) IV Push every 8 hours PRN Nausea and/or Vomiting          PHYSICAL EXAM:  Vital Signs Last 24 Hrs  T(C): 36.7 (18 Aug 2024 05:27), Max: 37.1 (17 Aug 2024 19:53)  T(F): 98 (18 Aug 2024 05:27), Max: 98.7 (17 Aug 2024 19:53)  HR: 75 (18 Aug 2024 05:27) (75 - 95)  BP: 110/70 (18 Aug 2024 05:27) (105/67 - 125/69)  BP(mean): --  RR: 18 (18 Aug 2024 05:27) (18 - 18)  SpO2: 98% (18 Aug 2024 05:27) (96% - 98%)    Parameters below as of 18 Aug 2024 05:27  Patient On (Oxygen Delivery Method): room air        I&O's Summary    16 Aug 2024 07:01  -  17 Aug 2024 07:00  --------------------------------------------------------  IN: 360 mL / OUT: 2720 mL / NET: -2360 mL    17 Aug 2024 07:01  -  18 Aug 2024 06:55  --------------------------------------------------------  IN: 360 mL / OUT: 2650 mL / NET: -2290 mL        General: NAD  CV: RRR, normal S1 and S2, no m/r/g  Lungs: normal respiratory effort. CTAB  Abd: soft, nontender, nondistended, BSx4  Ext: +1 LE edema, 2+ peripheral pulses   Pysch: AAOx3     LABS:  CAPILLARY BLOOD GLUCOSE                                  13.2   9.07  )-----------( 473      ( 18 Aug 2024 05:44 )             43.8       WBC Trend: 9.07<--, 8.78<--, 7.55<--  Hb Trend: 13.2<--, 12.6<--, 11.6<--, 13.0<--, 13.3<--    08-18    135  |  102  |  24<H>  ----------------------------<  206<H>  4.8   |  23  |  1.15    Ca    9.0      18 Aug 2024 05:44  Phos  4.1     08-18  Mg     2.0     08-18    TPro  6.5  /  Alb  3.4  /  TBili  0.7  /  DBili  x   /  AST  24  /  ALT  33  /  AlkPhos  90  08-18    PT/INR - ( 18 Aug 2024 05:44 )   PT: 14.7 sec;   INR: 1.35 ratio         PTT - ( 18 Aug 2024 05:44 )  PTT:35.9 sec      Urinalysis Basic - ( 18 Aug 2024 05:44 )    Color: x / Appearance: x / SG: x / pH: x  Gluc: 206 mg/dL / Ketone: x  / Bili: x / Urobili: x   Blood: x / Protein: x / Nitrite: x   Leuk Esterase: x / RBC: x / WBC x   Sq Epi: x / Non Sq Epi: x / Bacteria: x            RADIOLOGY & ADDITIONAL TESTS: Reviewed

## 2024-08-18 NOTE — PROGRESS NOTE ADULT - PROBLEM SELECTOR PLAN 3
CTPE (05/2024): Pt with Acute bilateral pulmonary emboli within the subsegmental arteries of all lobes. No evidence of right heart strain or pulmonary infarct.  CTPE (08/14): No pulmonary embolus through the segmental branches. Mildinterstitial edema and small pleural effusions.  Was on eliquis starter pack, pt was taking 5mg qd, noticed having oral bleeding. Unclear how compliant patient was with eliquis    Plan:  - started on heparin gtt, anticipation of LHC - > transitioned to lovenox BID per cards recs  - hold Eliquis 5mg BID

## 2024-08-18 NOTE — PROGRESS NOTE ADULT - PROBLEM SELECTOR PLAN 6
DVT: Lovenox  Diet: Diabetic, DASH, 1L restriction  Dispo: pending course, PT eval  Code Status: Full Code   GI Prophylaxis: Mylanta

## 2024-08-18 NOTE — PROGRESS NOTE ADULT - PROBLEM SELECTOR PLAN 1
Pt with jimenez, orthopnea, b/l LE pitting edema, elevated BNP  h/o treatment with immunotherapy with possible cardiotoxicity  CXR (8/14): Mild basal congestion with trace effusions left basilar atelectasis..  Lipid Panel wnl  TTE (8/14): EF20-25%    Plan:  - cardiology following, appreciate recs - possible cath tomorrow vs Monday  - IV lasix 40mg BID  - Started entresto 24/26 BID  - Metoprolol 25 BID started 8/17  - pending [facility]  - monitor lytes  - strict I/Os  - standing weights  - tele  - fluid restriction 1L Pt with jimenez, orthopnea, b/l LE pitting edema, elevated BNP  h/o treatment with immunotherapy with possible cardiotoxicity  CXR (8/14): Mild basal congestion with trace effusions left basilar atelectasis..  Lipid Panel wnl  TTE (8/14): EF20-25%  [facility] 8/18: Non obstructive CAD, RHC shows evidence of volume down    Plan:  - cardiology following, appreciate recs - possible cath tomorrow vs Monday  - hold IV lasix 40mg BID  - c/w entresto 24/26 BID  - c/w Metoprolol 25 BID started 8/17  - pending [facility]  - monitor lytes  - strict I/Os  - standing weights  - tele  - fluid restriction 1L

## 2024-08-18 NOTE — PROGRESS NOTE ADULT - ATTENDING COMMENTS
Patient seen and examined at bedside. No acute events overnight. Leg swelling continues to improve. Pending [facility] today. Further GDMT adjustments. DC soon. Resume AC when feasible. Onc consult prior to DC

## 2024-08-18 NOTE — PROVIDER CONTACT NOTE (MEDICATION) - SITUATION
Day nurse reported at handoff that patient was supposed to have a 9pm dose of Lovenox, however it was discontinued at 2005.

## 2024-08-19 ENCOUNTER — APPOINTMENT (OUTPATIENT)
Dept: INFUSION THERAPY | Facility: HOSPITAL | Age: 74
End: 2024-08-19

## 2024-08-19 ENCOUNTER — APPOINTMENT (OUTPATIENT)
Dept: HEMATOLOGY ONCOLOGY | Facility: CLINIC | Age: 74
End: 2024-08-19

## 2024-08-19 ENCOUNTER — NON-APPOINTMENT (OUTPATIENT)
Age: 74
End: 2024-08-19

## 2024-08-19 ENCOUNTER — TRANSCRIPTION ENCOUNTER (OUTPATIENT)
Age: 74
End: 2024-08-19

## 2024-08-19 VITALS
HEART RATE: 85 BPM | OXYGEN SATURATION: 99 % | SYSTOLIC BLOOD PRESSURE: 128 MMHG | RESPIRATION RATE: 17 BRPM | TEMPERATURE: 98 F | DIASTOLIC BLOOD PRESSURE: 76 MMHG

## 2024-08-19 LAB — G6PD RBC-CCNC: SIGNIFICANT CHANGE UP

## 2024-08-19 PROCEDURE — 99239 HOSP IP/OBS DSCHRG MGMT >30: CPT

## 2024-08-19 PROCEDURE — 84100 ASSAY OF PHOSPHORUS: CPT

## 2024-08-19 PROCEDURE — 85610 PROTHROMBIN TIME: CPT

## 2024-08-19 PROCEDURE — 83735 ASSAY OF MAGNESIUM: CPT

## 2024-08-19 PROCEDURE — 93456 R HRT CORONARY ARTERY ANGIO: CPT

## 2024-08-19 PROCEDURE — 84443 ASSAY THYROID STIM HORMONE: CPT

## 2024-08-19 PROCEDURE — 36415 COLL VENOUS BLD VENIPUNCTURE: CPT

## 2024-08-19 PROCEDURE — 83010 ASSAY OF HAPTOGLOBIN QUANT: CPT

## 2024-08-19 PROCEDURE — 93308 TTE F-UP OR LMTD: CPT

## 2024-08-19 PROCEDURE — 86850 RBC ANTIBODY SCREEN: CPT

## 2024-08-19 PROCEDURE — C1769: CPT

## 2024-08-19 PROCEDURE — 71045 X-RAY EXAM CHEST 1 VIEW: CPT

## 2024-08-19 PROCEDURE — 99233 SBSQ HOSP IP/OBS HIGH 50: CPT | Mod: GC

## 2024-08-19 PROCEDURE — 84484 ASSAY OF TROPONIN QUANT: CPT

## 2024-08-19 PROCEDURE — 86901 BLOOD TYPING SEROLOGIC RH(D): CPT

## 2024-08-19 PROCEDURE — 84550 ASSAY OF BLOOD/URIC ACID: CPT

## 2024-08-19 PROCEDURE — C1894: CPT

## 2024-08-19 PROCEDURE — 96375 TX/PRO/DX INJ NEW DRUG ADDON: CPT

## 2024-08-19 PROCEDURE — 93325 DOPPLER ECHO COLOR FLOW MAPG: CPT

## 2024-08-19 PROCEDURE — 86900 BLOOD TYPING SEROLOGIC ABO: CPT

## 2024-08-19 PROCEDURE — C8924: CPT

## 2024-08-19 PROCEDURE — C1887: CPT

## 2024-08-19 PROCEDURE — 83615 LACTATE (LD) (LDH) ENZYME: CPT

## 2024-08-19 PROCEDURE — 83880 ASSAY OF NATRIURETIC PEPTIDE: CPT

## 2024-08-19 PROCEDURE — 80061 LIPID PANEL: CPT

## 2024-08-19 PROCEDURE — 96374 THER/PROPH/DIAG INJ IV PUSH: CPT

## 2024-08-19 PROCEDURE — 85730 THROMBOPLASTIN TIME PARTIAL: CPT

## 2024-08-19 PROCEDURE — 80053 COMPREHEN METABOLIC PANEL: CPT

## 2024-08-19 PROCEDURE — 82955 ASSAY OF G6PD ENZYME: CPT

## 2024-08-19 PROCEDURE — 71275 CT ANGIOGRAPHY CHEST: CPT | Mod: MC

## 2024-08-19 PROCEDURE — 83036 HEMOGLOBIN GLYCOSYLATED A1C: CPT

## 2024-08-19 PROCEDURE — 99285 EMERGENCY DEPT VISIT HI MDM: CPT

## 2024-08-19 PROCEDURE — 82803 BLOOD GASES ANY COMBINATION: CPT

## 2024-08-19 PROCEDURE — 85025 COMPLETE CBC W/AUTO DIFF WBC: CPT

## 2024-08-19 RX ORDER — SACUBITRIL AND VALSARTAN 49; 51 MG/1; MG/1
1 TABLET, FILM COATED ORAL
Refills: 0 | Status: DISCONTINUED | OUTPATIENT
Start: 2024-08-19 | End: 2024-08-19

## 2024-08-19 RX ORDER — SACUBITRIL AND VALSARTAN 49; 51 MG/1; MG/1
1 TABLET, FILM COATED ORAL
Qty: 60 | Refills: 0
Start: 2024-08-19 | End: 2024-09-17

## 2024-08-19 RX ORDER — METOPROLOL TARTRATE 100 MG/1
1 TABLET ORAL
Qty: 30 | Refills: 0
Start: 2024-08-19 | End: 2024-09-17

## 2024-08-19 RX ORDER — APIXABAN 5 MG/1
1 TABLET, FILM COATED ORAL
Qty: 60 | Refills: 0
Start: 2024-08-19 | End: 2024-09-17

## 2024-08-19 RX ADMIN — METOPROLOL TARTRATE 25 MILLIGRAM(S): 100 TABLET ORAL at 07:11

## 2024-08-19 RX ADMIN — ANAGRELIDE 0.5 MILLIGRAM(S): 0.5 CAPSULE ORAL at 07:11

## 2024-08-19 RX ADMIN — IMATINIB MESYLATE 400 MILLIGRAM(S): 100 TABLET, FILM COATED ORAL at 11:09

## 2024-08-19 RX ADMIN — ANAGRELIDE 0.5 MILLIGRAM(S): 0.5 CAPSULE ORAL at 17:05

## 2024-08-19 RX ADMIN — APIXABAN 5 MILLIGRAM(S): 5 TABLET, FILM COATED ORAL at 11:08

## 2024-08-19 RX ADMIN — SACUBITRIL AND VALSARTAN 1 TABLET(S): 49; 51 TABLET, FILM COATED ORAL at 17:04

## 2024-08-19 RX ADMIN — SACUBITRIL AND VALSARTAN 1 TABLET(S): 49; 51 TABLET, FILM COATED ORAL at 07:12

## 2024-08-19 RX ADMIN — Medication 1 DROP(S): at 07:12

## 2024-08-19 NOTE — PROGRESS NOTE ADULT - PROBLEM SELECTOR PROBLEM 2
MDS/MPN (myelodysplastic/myeloproliferative neoplasms)

## 2024-08-19 NOTE — PROGRESS NOTE ADULT - PROBLEM SELECTOR PLAN 5
Plan:  - c/w latanoprost 0.005%  - c/w dorzolamide2%/timolol 0.5%

## 2024-08-19 NOTE — DISCHARGE NOTE NURSING/CASE MANAGEMENT/SOCIAL WORK - PATIENT PORTAL LINK FT
You can access the FollowMyHealth Patient Portal offered by [facility] by registering at the following website: [email or web address]. By joining [facility]’s FollowMyHealth portal, you will also be able to view your health information using other applications (apps) compatible with our system.

## 2024-08-19 NOTE — PROGRESS NOTE ADULT - PROBLEM SELECTOR PLAN 6
DVT: Lovenox  Diet: Diabetic, DASH, 1L restriction  Dispo: pending course, PT eval  Code Status: Full Code   GI Prophylaxis: Mylanta DVT: Eliquis  Diet: Diabetic, DASH, 1L restriction  Dispo: pending course, PT eval  Code Status: Full Code   GI Prophylaxis: Mylanta

## 2024-08-19 NOTE — DISCHARGE NOTE NURSING/CASE MANAGEMENT/SOCIAL WORK - NSDCPEFALRISK_GEN_ALL_CORE
For information on Fall & Injury Prevention, visit: [email or web address] OR  [email or web address] OR  https://www.cdc.gov/steadi/patient.html

## 2024-08-19 NOTE — PROGRESS NOTE ADULT - SUBJECTIVE AND OBJECTIVE BOX
Overnight Events: NAEO    Review Of Systems: No chest pain, shortness of breath, or palpitations            Current Meds:  acetaminophen     Tablet .. 650 milliGRAM(s) Oral every 6 hours PRN  aluminum hydroxide/magnesium hydroxide/simethicone Suspension 30 milliLiter(s) Oral every 4 hours PRN  anagrelide 0.5 milliGRAM(s) Oral <User Schedule>  apixaban 5 milliGRAM(s) Oral every 12 hours  atorvastatin 40 milliGRAM(s) Oral at bedtime  dorzolamide 2%/timolol 0.5% Ophthalmic Solution 1 Drop(s) Both EYES two times a day  imatinib 400 milliGRAM(s) Oral daily  latanoprost 0.005% Ophthalmic Solution 1 Drop(s) Both EYES at bedtime  melatonin 3 milliGRAM(s) Oral at bedtime  metoprolol succinate ER 25 milliGRAM(s) Oral daily  ondansetron Injectable 4 milliGRAM(s) IV Push every 8 hours PRN  sacubitril 24 mG/valsartan 26 mG 1 Tablet(s) Oral two times a day      Vitals:  T(F): 98 (08-19), Max: 98.2 (08-19)  HR: 86 (08-19) (67 - 86)  BP: 116/66 (08-19) (98/70 - 126/73)  RR: 18 (08-19)  SpO2: 99% (08-19)  I&O's Summary    18 Aug 2024 07:01  -  19 Aug 2024 07:00  --------------------------------------------------------  IN: 580 mL / OUT: 1800 mL / NET: -1220 mL    19 Aug 2024 07:01  -  19 Aug 2024 10:41  --------------------------------------------------------  IN: 360 mL / OUT: 0 mL / NET: 360 mL        Physical Exam:  GEN: comfortable appearing, lying in bed in NAD  HEENT: NCAT, MMM  CV: Regular S1, S2, no m/r/g  RESP: CTAB  ABD: Soft, NTND, +BS  EXT: No LE edema, WWP, pulses palpable throughout  NEURO: No focal deficits, AOx3  SKIN:  No rashes                          13.2   9.07  )-----------( 473      ( 18 Aug 2024 05:44 )             43.8     08-18    135  |  102  |  24<H>  ----------------------------<  206<H>  4.8   |  23  |  1.15    Ca    9.0      18 Aug 2024 05:44  Phos  4.1     08-18  Mg     2.0     08-18    TPro  6.5  /  Alb  3.4  /  TBili  0.7  /  DBili  x   /  AST  24  /  ALT  33  /  AlkPhos  90  08-18    PT/INR - ( 18 Aug 2024 05:44 )   PT: 14.7 sec;   INR: 1.35 ratio         PTT - ( 18 Aug 2024 05:44 )  PTT:35.9 sec  CARDIAC MARKERS ( 15 Aug 2024 06:21 )  21 ng/L / x     / x     / x     / x     / x      CARDIAC MARKERS ( 14 Aug 2024 09:25 )  20 ng/L / x     / x     / x     / x     / x      CARDIAC MARKERS ( 14 Aug 2024 07:25 )  18 ng/L / x     / x     / x     / x     / x

## 2024-08-19 NOTE — PROGRESS NOTE ADULT - SUBJECTIVE AND OBJECTIVE BOX
Progress Note    08-14-24 (5d)    Patient is a 74y old  Male who presents with a chief complaint of Shortness of Breath, Chest Pain (18 Aug 2024 06:54)      Subjective / Overnight Events :  - No acute events overnight.  - Pt seen and examined at bedside.     Additional ROS (if any):    MEDICATIONS  (STANDING):  anagrelide 0.5 milliGRAM(s) Oral <User Schedule>  apixaban 5 milliGRAM(s) Oral every 12 hours  atorvastatin 40 milliGRAM(s) Oral at bedtime  dorzolamide 2%/timolol 0.5% Ophthalmic Solution 1 Drop(s) Both EYES two times a day  imatinib 400 milliGRAM(s) Oral daily  lactated ringers. 500 milliLiter(s) (100 mL/Hr) IV Continuous <Continuous>  latanoprost 0.005% Ophthalmic Solution 1 Drop(s) Both EYES at bedtime  melatonin 3 milliGRAM(s) Oral at bedtime  metoprolol succinate ER 25 milliGRAM(s) Oral daily  sacubitril 24 mG/valsartan 26 mG 1 Tablet(s) Oral two times a day    MEDICATIONS  (PRN):  acetaminophen     Tablet .. 650 milliGRAM(s) Oral every 6 hours PRN Temp greater or equal to 38C (100.4F), Mild Pain (1 - 3)  aluminum hydroxide/magnesium hydroxide/simethicone Suspension 30 milliLiter(s) Oral every 4 hours PRN Dyspepsia  ondansetron Injectable 4 milliGRAM(s) IV Push every 8 hours PRN Nausea and/or Vomiting          PHYSICAL EXAM:  Vital Signs Last 24 Hrs  T(C): 36.7 (19 Aug 2024 04:29), Max: 36.8 (19 Aug 2024 00:07)  T(F): 98 (19 Aug 2024 04:29), Max: 98.2 (19 Aug 2024 00:07)  HR: 86 (19 Aug 2024 04:29) (67 - 86)  BP: 116/66 (19 Aug 2024 04:29) (98/70 - 126/73)  BP(mean): --  RR: 18 (19 Aug 2024 04:29) (16 - 18)  SpO2: 99% (19 Aug 2024 04:29) (94% - 100%)    Parameters below as of 19 Aug 2024 04:29  Patient On (Oxygen Delivery Method): room air        I&O's Summary    18 Aug 2024 07:01  -  19 Aug 2024 07:00  --------------------------------------------------------  IN: 580 mL / OUT: 1800 mL / NET: -1220 mL        General: NAD  HEENT: PERRLA, EOMi, no scleral icterus  CV: RRR, normal S1 and S2, no m/r/g  Lungs: normal respiratory effort. CTAB, no wheezes, rales, or rhonchi  Abd: soft, nontender, nondistended, BSx4  Ext: no edema, 2+ peripheral pulses   Pysch: AAOx3  Neuro: grossly non-focal, moving all extremities spontaneously   Skin: no rashes or lesions     LABS:  CAPILLARY BLOOD GLUCOSE                                  13.2   9.07  )-----------( 473      ( 18 Aug 2024 05:44 )             43.8       WBC Trend: 9.07<--, 8.78<--, 7.55<--  Hb Trend: 13.2<--, 12.6<--, 11.6<--, 13.0<--, 13.3<--    08-18    135  |  102  |  24<H>  ----------------------------<  206<H>  4.8   |  23  |  1.15    Ca    9.0      18 Aug 2024 05:44  Phos  4.1     08-18  Mg     2.0     08-18    TPro  6.5  /  Alb  3.4  /  TBili  0.7  /  DBili  x   /  AST  24  /  ALT  33  /  AlkPhos  90  08-18    PT/INR - ( 18 Aug 2024 05:44 )   PT: 14.7 sec;   INR: 1.35 ratio         PTT - ( 18 Aug 2024 05:44 )  PTT:35.9 sec      Urinalysis Basic - ( 18 Aug 2024 05:44 )    Color: x / Appearance: x / SG: x / pH: x  Gluc: 206 mg/dL / Ketone: x  / Bili: x / Urobili: x   Blood: x / Protein: x / Nitrite: x   Leuk Esterase: x / RBC: x / WBC x   Sq Epi: x / Non Sq Epi: x / Bacteria: x            RADIOLOGY & ADDITIONAL TESTS: Reviewed Progress Note    08-14-24 (5d)    Patient is a 74y old  Male who presents with a chief complaint of Shortness of Breath, Chest Pain (18 Aug 2024 06:54)      Subjective / Overnight Events :  - Overnight: 5.8s episode of atrial tachycardia up to 170bpm. Pt asymptomatic. Spontaneously resolved  - Pt seen and examined at bedside.   - Pt feeling well, says he has no issues with breathing, able to lay flat, no leg swelling  - Denies cp, cough, abd pain, dysuria, d/c    Additional ROS (if any):    MEDICATIONS  (STANDING):  anagrelide 0.5 milliGRAM(s) Oral <User Schedule>  apixaban 5 milliGRAM(s) Oral every 12 hours  atorvastatin 40 milliGRAM(s) Oral at bedtime  dorzolamide 2%/timolol 0.5% Ophthalmic Solution 1 Drop(s) Both EYES two times a day  imatinib 400 milliGRAM(s) Oral daily  lactated ringers. 500 milliLiter(s) (100 mL/Hr) IV Continuous <Continuous>  latanoprost 0.005% Ophthalmic Solution 1 Drop(s) Both EYES at bedtime  melatonin 3 milliGRAM(s) Oral at bedtime  metoprolol succinate ER 25 milliGRAM(s) Oral daily  sacubitril 24 mG/valsartan 26 mG 1 Tablet(s) Oral two times a day    MEDICATIONS  (PRN):  acetaminophen     Tablet .. 650 milliGRAM(s) Oral every 6 hours PRN Temp greater or equal to 38C (100.4F), Mild Pain (1 - 3)  aluminum hydroxide/magnesium hydroxide/simethicone Suspension 30 milliLiter(s) Oral every 4 hours PRN Dyspepsia  ondansetron Injectable 4 milliGRAM(s) IV Push every 8 hours PRN Nausea and/or Vomiting          PHYSICAL EXAM:  Vital Signs Last 24 Hrs  T(C): 36.7 (19 Aug 2024 04:29), Max: 36.8 (19 Aug 2024 00:07)  T(F): 98 (19 Aug 2024 04:29), Max: 98.2 (19 Aug 2024 00:07)  HR: 86 (19 Aug 2024 04:29) (67 - 86)  BP: 116/66 (19 Aug 2024 04:29) (98/70 - 126/73)  BP(mean): --  RR: 18 (19 Aug 2024 04:29) (16 - 18)  SpO2: 99% (19 Aug 2024 04:29) (94% - 100%)    Parameters below as of 19 Aug 2024 04:29  Patient On (Oxygen Delivery Method): room air        I&O's Summary    18 Aug 2024 07:01  -  19 Aug 2024 07:00  --------------------------------------------------------  IN: 580 mL / OUT: 1800 mL / NET: -1220 mL        General: NAD  CV: RRR, normal S1 and S2, no m/r/g  Lungs: normal respiratory effort. CTAB  Abd: soft, nontender, nondistended, BSx4  Ext: +1 LE edema, 2+ peripheral pulses   Pysch: AAOx3       LABS:  CAPILLARY BLOOD GLUCOSE                                  13.2   9.07  )-----------( 473      ( 18 Aug 2024 05:44 )             43.8       WBC Trend: 9.07<--, 8.78<--, 7.55<--  Hb Trend: 13.2<--, 12.6<--, 11.6<--, 13.0<--, 13.3<--    08-18    135  |  102  |  24<H>  ----------------------------<  206<H>  4.8   |  23  |  1.15    Ca    9.0      18 Aug 2024 05:44  Phos  4.1     08-18  Mg     2.0     08-18    TPro  6.5  /  Alb  3.4  /  TBili  0.7  /  DBili  x   /  AST  24  /  ALT  33  /  AlkPhos  90  08-18    PT/INR - ( 18 Aug 2024 05:44 )   PT: 14.7 sec;   INR: 1.35 ratio         PTT - ( 18 Aug 2024 05:44 )  PTT:35.9 sec      Urinalysis Basic - ( 18 Aug 2024 05:44 )    Color: x / Appearance: x / SG: x / pH: x  Gluc: 206 mg/dL / Ketone: x  / Bili: x / Urobili: x   Blood: x / Protein: x / Nitrite: x   Leuk Esterase: x / RBC: x / WBC x   Sq Epi: x / Non Sq Epi: x / Bacteria: x            RADIOLOGY & ADDITIONAL TESTS: Reviewed

## 2024-08-19 NOTE — PROGRESS NOTE ADULT - ASSESSMENT
New ECG(s): Personally reviewed    Echo:  8/14/24    1. Left ventricular systolic function is severely decreased with an ejection fraction visually estimated at 20 to 25 %. Global left ventricular hypokinesis.   2. Mildly reduced right ventricular systolic function.   3. Left atrium is moderately dilated.   4. Compared to the transthoracic echocardiogram performed on 5/23/2024, biventricular systolic function has worsened.    Cath:  8/18/24  Coronary Angiography   The coronary circulation is right dominant.      LM   Left main artery: Angiography shows no disease.      LAD   Left anterior descending artery: Angiography shows minor  irregularities.    CX   Circumflex: Angiography shows minor irregularities.      RCA   Right coronary artery: Angiography shows minor irregularities.      Ramus   Ramus intermedius: Angiography shows minor irregularities.      RHC: RA 1  PA 21/13(16)  PCWP 8  CO/CI 3.63/1.94 SVR 1565    No obstructive coronary artery disease.   Hemodynamics notable for low biventricular filling pressures with  preserved cardiac output.    Interpretation of Telemetry: NSR    74M PMH T2DM, MDS/MPN/CML (formerly on luspatercept, jakafi, anagrelide, ASA, imatinib), PE on Eliquis (05/2024), chronic venous insufficiency, lyme disease, glaucoma consulted for new HFrEF 20-25%. Nonobstructive CAD on LHC and current chemotherapy agents not typically associated with the development of iatrogenic heart failure. Will pursue cMRI to complete evaluation of nonischemic CMP. Relatively hypovolemic on RHC on 8/18 now s/p 500cc IVF and euvolemic on exam       #HFrEF  - cMRI  - GDMT:  -- c/w metoprolol XL 25mg PO QDay   -- increase Entresto to 48-52mg PO BID   - would hold memelotinib and anagrelide if acceptable from Heme/Onc perspective pending cardiac workup given adverse cardiac event risk noted  - c/w atorvastatin  - please document dry weight of 69.7kg in discharge paperwork  - follow-up with Dr. Ortiz-Tonya outpatient, can call [phone] for appointment   - please add-on proBNP to today's AM labs    #Hx PE  - c/w home apixaban

## 2024-08-19 NOTE — PROGRESS NOTE ADULT - PROBLEM SELECTOR PLAN 1
Pt with jimenez, orthopnea, b/l LE pitting edema, elevated BNP  h/o treatment with immunotherapy with possible cardiotoxicity  CXR (8/14): Mild basal congestion with trace effusions left basilar atelectasis..  Lipid Panel wnl  TTE (8/14): EF20-25%  [facility] 8/18: Non obstructive CAD, RHC shows evidence of volume down    Plan:  - cardiology following, appreciate recs - possible cath tomorrow vs Monday  - hold IV lasix 40mg BID  - c/w entresto 24/26 BID  - c/w Metoprolol 25 BID started 8/17  - pending [facility]  - monitor lytes  - strict I/Os  - standing weights  - tele  - fluid restriction 1L Pt with jimenez, orthopnea, b/l LE pitting edema, elevated BNP  h/o treatment with immunotherapy with possible cardiotoxicity  CXR (8/14): Mild basal congestion with trace effusions left basilar atelectasis..  Lipid Panel wnl  TTE (8/14): EF20-25%  [facility] 8/18: Non obstructive CAD, RHC shows evidence of volume down    Plan:  - cardiology following, appreciate recs  - hold IV lasix 40mg BID  - uptitrate entresto 48/52 BID  - c/w Metoprolol 25 BID started 8/17  - cardiac MR outpatient   - monitor lytes  - strict I/Os  - standing weights  - tele  - fluid restriction 1L

## 2024-08-19 NOTE — PROGRESS NOTE ADULT - NUTRITIONAL ASSESSMENT
This patient has been assessed with a concern for Malnutrition and has been determined to have a diagnosis/diagnoses of Moderate protein-calorie malnutrition.    This patient is being managed with:   Diet Regular-  Consistent Carbohydrate {Evening Snack} (CSTCHOSN)  DASH/TLC {Sodium & Cholesterol Restricted} (DASH)  1000mL Fluid Restriction ([id number])  Supplement Feeding Modality:  Oral  Ensure Max Cans or Servings Per Day:  1       Frequency:  Daily  Entered: Aug 16 2024  4:33PM  
This patient has been assessed with a concern for Malnutrition and has been determined to have a diagnosis/diagnoses of Moderate protein-calorie malnutrition.    This patient is being managed with:   Diet Regular-  Consistent Carbohydrate {Evening Snack} (CSTCHOSN)  DASH/TLC {Sodium & Cholesterol Restricted} (DASH)  1000mL Fluid Restriction ([id number])  Supplement Feeding Modality:  Oral  Ensure Max Cans or Servings Per Day:  1       Frequency:  Daily  Entered: Aug 16 2024  4:33PM  
This patient has been assessed with a concern for Malnutrition and has been determined to have a diagnosis/diagnoses of Moderate protein-calorie malnutrition.    This patient is being managed with:   Diet Regular-  Consistent Carbohydrate {Evening Snack} (CSTCHOSN)  DASH/TLC {Sodium & Cholesterol Restricted} (DASH)  1000mL Fluid Restriction ([id number])  Supplement Feeding Modality:  Oral  Ensure Max Cans or Servings Per Day:  1       Frequency:  Daily  Entered: Aug 16 2024  4:33PM

## 2024-08-19 NOTE — PROGRESS NOTE ADULT - REASON FOR ADMISSION
Shortness of Breath, Chest Pain

## 2024-08-19 NOTE — PROGRESS NOTE ADULT - ATTENDING COMMENTS
74 year old man with newly recognized severe left ventricular dysfunction with severely reduced systolic function and no obstructive coronary artery disease. He is on anticoagulation with apixaban for prior pulmonary embolus. To have cardiac MRI. Instituting GDMT. He has been on chemotherapy for myelodysplastic/myeloproliferative neoplasm.    To contact call Cardiology Fellow or Attending as listed on [email or web address] password: [email or web address].

## 2024-08-21 ENCOUNTER — TRANSCRIPTION ENCOUNTER (OUTPATIENT)
Age: 74
End: 2024-08-21

## 2024-08-26 ENCOUNTER — APPOINTMENT (OUTPATIENT)
Dept: HEMATOLOGY ONCOLOGY | Facility: CLINIC | Age: 74
End: 2024-08-26

## 2024-08-27 NOTE — CHART NOTE - NSCHARTNOTEFT_GEN_A_CORE
Patient was outreached but did not answer. A voicemail was left for the patient to return our call.
Patient was outreached but did not answer. A voicemail was left for the patient to return our call.
RD CHF education chart note    Patient was visited by RD for CHF education. Heart failure education provided to the patient in detail. Discussed heart failure nutrition therapy, sodium and fluid intake, importance of diet adherence, daily weights monitoring with the patient. Reinforced importance of weight gain parameters and importance of contacting MD’s about weight changes. Provided handouts on heart failure nutrition therapy, reading heart healthy nutrition labels, heart healthy shopping tips and low sodium food list. Patient verbalized understanding demonstrated by teach back method. RD contact information left with patient for any future questioning.
HEMATOLOGY FELLOW NOTE     73M hx MDS/MPN/CML (on anagrelide, momelotinib, imatinib), T2DM, lyme dz, chronic formication, glaucoma, chronic venous insufficiency p/w CP and [place], admitted for heart failure workup. Discussed with on-service attending and patient's primary hematologist Dr. Mayers. Patient has been significantly non-adherent to his therapy for his MDS/MPN/CML.    He should be continued on Ojjaara (momelotinib) 200mg PO qd, imatinib 400mg PO qd, and anagrelide 0.5mg PO BID. He will have to bring in the Ojjaara from home.    Full consult to follow in AM.    Juan Baires MD  Hematology/Oncology Fellow PGY-6  Pager: [facility] [phone] / [phone]  After 5pm and on weekends please page on-call fellow
Patient seen this morning. He states he wants to leave today -- which would be against medical advice. He states tonight was the first night he was able to sleep w/o SOB and appears more able to lie flat. Lower extremity swelling improving, but still present. I explained to him that his TTE shows a new HFrEF (explained in laymen's terms that it's weak). He states he has things to do and that he has medications at home which are doing the same thing we are doing (though he only takes Eliquis at home). I stated if he leaves today he may find his symptoms return very quickly and he could be forced to come back to a hospital quite quickly. Patient states he doesn't think so. He has capacity. He has historically been poorly compliant (stopped all of his heme/onc medications).    I told him we will touch base later. If he decides to stay plan would be for further diuresis and cardiology evaluation.

## 2024-08-28 ENCOUNTER — APPOINTMENT (OUTPATIENT)
Dept: HEMATOLOGY ONCOLOGY | Facility: CLINIC | Age: 74
End: 2024-08-28

## 2024-08-28 ENCOUNTER — RESULT REVIEW (OUTPATIENT)
Age: 74
End: 2024-08-28

## 2024-08-28 ENCOUNTER — APPOINTMENT (OUTPATIENT)
Dept: INFUSION THERAPY | Facility: HOSPITAL | Age: 74
End: 2024-08-28

## 2024-08-28 ENCOUNTER — NON-APPOINTMENT (OUTPATIENT)
Age: 74
End: 2024-08-28

## 2024-08-28 ENCOUNTER — APPOINTMENT (OUTPATIENT)
Dept: HEMATOLOGY ONCOLOGY | Facility: CLINIC | Age: 74
End: 2024-08-28
Payer: MEDICARE

## 2024-08-28 VITALS
BODY MASS INDEX: 23.32 KG/M2 | WEIGHT: 156.75 LBS | SYSTOLIC BLOOD PRESSURE: 130 MMHG | HEART RATE: 80 BPM | OXYGEN SATURATION: 98 % | RESPIRATION RATE: 16 BRPM | TEMPERATURE: 97.5 F | DIASTOLIC BLOOD PRESSURE: 81 MMHG

## 2024-08-28 DIAGNOSIS — D75.81 MYELOFIBROSIS: ICD-10-CM

## 2024-08-28 DIAGNOSIS — I50.9 HEART FAILURE, UNSPECIFIED: ICD-10-CM

## 2024-08-28 DIAGNOSIS — C92.10 CHRONIC MYELOID LEUKEMIA, BCR/ABL-POSITIVE, NOT HAVING ACHIEVED REMISSION: ICD-10-CM

## 2024-08-28 DIAGNOSIS — C94.6 MYELODYSPLASTIC DISEASE, NOT CLASSIFIED: ICD-10-CM

## 2024-08-28 LAB
ANISOCYTOSIS BLD QL: SIGNIFICANT CHANGE UP
BASOPHILS # BLD AUTO: 0.08 K/UL — SIGNIFICANT CHANGE UP (ref 0–0.2)
BASOPHILS NFR BLD AUTO: 1 % — SIGNIFICANT CHANGE UP (ref 0–2)
BLASTS # FLD: 1 % — HIGH (ref 0–0)
DACRYOCYTES BLD QL SMEAR: SIGNIFICANT CHANGE UP
ELLIPTOCYTES BLD QL SMEAR: SLIGHT — SIGNIFICANT CHANGE UP
EOSINOPHIL # BLD AUTO: 0.41 K/UL — SIGNIFICANT CHANGE UP (ref 0–0.5)
EOSINOPHIL NFR BLD AUTO: 5 % — SIGNIFICANT CHANGE UP (ref 0–6)
GIANT PLATELETS BLD QL SMEAR: PRESENT — SIGNIFICANT CHANGE UP
HCT VFR BLD CALC: 36.5 % — LOW (ref 39–50)
HGB BLD-MCNC: 11.5 G/DL — LOW (ref 13–17)
HYPOCHROMIA BLD QL: SLIGHT — SIGNIFICANT CHANGE UP
LG PLATELETS BLD QL AUTO: SIGNIFICANT CHANGE UP
LYMPHOCYTES # BLD AUTO: 1.46 K/UL — SIGNIFICANT CHANGE UP (ref 1–3.3)
LYMPHOCYTES # BLD AUTO: 18 % — SIGNIFICANT CHANGE UP (ref 13–44)
MCHC RBC-ENTMCNC: 26.3 PG — LOW (ref 27–34)
MCHC RBC-ENTMCNC: 31.5 G/DL — LOW (ref 32–36)
MCV RBC AUTO: 83.3 FL — SIGNIFICANT CHANGE UP (ref 80–100)
METAMYELOCYTES # FLD: 3 % — HIGH (ref 0–0)
MONOCYTES # BLD AUTO: 0.49 K/UL — SIGNIFICANT CHANGE UP (ref 0–0.9)
MONOCYTES NFR BLD AUTO: 6 % — SIGNIFICANT CHANGE UP (ref 2–14)
MYELOCYTES NFR BLD: 5 % — HIGH (ref 0–0)
NEUTROPHILS # BLD AUTO: 4.94 K/UL — SIGNIFICANT CHANGE UP (ref 1.8–7.4)
NEUTROPHILS NFR BLD AUTO: 61 % — SIGNIFICANT CHANGE UP (ref 43–77)
NRBC # BLD: 2 /100 WBCS — HIGH (ref 0–0)
NRBC # BLD: SIGNIFICANT CHANGE UP /100 WBCS (ref 0–0)
PLAT MORPH BLD: ABNORMAL
PLATELET # BLD AUTO: 1013 K/UL — CRITICAL HIGH (ref 150–400)
POIKILOCYTOSIS BLD QL AUTO: SIGNIFICANT CHANGE UP
RBC # BLD: 4.38 M/UL — SIGNIFICANT CHANGE UP (ref 4.2–5.8)
RBC # FLD: 29.4 % — HIGH (ref 10.3–14.5)
RBC BLD AUTO: ABNORMAL
SCHISTOCYTES BLD QL AUTO: SIGNIFICANT CHANGE UP
TARGETS BLD QL SMEAR: SLIGHT — SIGNIFICANT CHANGE UP
WBC # BLD: 8.1 K/UL — SIGNIFICANT CHANGE UP (ref 3.8–10.5)
WBC # FLD AUTO: 8.1 K/UL — SIGNIFICANT CHANGE UP (ref 3.8–10.5)

## 2024-08-28 PROCEDURE — 99214 OFFICE O/P EST MOD 30 MIN: CPT

## 2024-08-28 RX ORDER — METOPROLOL TARTRATE 25 MG/1
25 TABLET, FILM COATED ORAL DAILY
Qty: 90 | Refills: 1 | Status: ACTIVE | COMMUNITY
Start: 2024-08-28

## 2024-08-28 RX ORDER — ATORVASTATIN CALCIUM 40 MG/1
40 TABLET, FILM COATED ORAL
Qty: 30 | Refills: 0 | Status: ACTIVE | COMMUNITY
Start: 2024-08-28

## 2024-08-28 RX ORDER — SACUBITRIL AND VALSARTAN 49; 51 MG/1; MG/1
49-51 TABLET, FILM COATED ORAL TWICE DAILY
Refills: 0 | Status: ACTIVE | COMMUNITY
Start: 2024-08-28

## 2024-08-29 LAB
HGB FLD-MCNC: 12.3 G/DL — LOW (ref 12.6–17.4)
OXYHGB MFR BLDMV: 62.8 % — LOW (ref 90–95)
SAO2 % BLD: 63.9 % — SIGNIFICANT CHANGE UP (ref 60–90)

## 2024-08-30 NOTE — PHYSICAL EXAM
[Fully active, able to carry on all pre-disease performance without restriction] : Status 0 - Fully active, able to carry on all pre-disease performance without restriction [Normal] : affect appropriate [de-identified] : no longer palpale spleen, palpable spleen 5-6 cm below the costal margin during January visit

## 2024-08-30 NOTE — ASSESSMENT
[FreeTextEntry1] : 74-year-old Mr. BAIN is seen in follow up for anemia, thrombocytosis and rule out myelofibrosis. Patient was initially seen more than a year ago (02/2022). He was and continues to be completely asymptomatic. His anemia was normocytic, he has had low folic acid that normalized after folate supplementation. Noted to have mild high ferritin and persistent moderately high LDH in the absence evidence of hemolysis. He did not have splenomegaly on physical exam (02/2022). Intramedullary hemolysis may have happened due to folate deficiency; However, a primary bone marrow process like PMF could not be ruled out. This year (10/2023) he returned for further w/u.  He underwent a bone marrow biopsy. Results are interesting:  Bone marrow biopsy and bone marrow aspirate - Myeloid neoplasm with concurrent BCR-ABL1 translocation, and JAK2 and SF3B1 co-mutations and severe fibrosis, dyserythropoiesis (ring sideroblasts), markedly increased in megakaryocytes, atypical megakaryocytes  (some small some enlarged with multi-lobated nuclei, some naked nuclei) no excess blasts.   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, BCR::ABL positive thrombocytosis)   Since his diagnosis, he has been having challenges with medication compliance. It has been difficult to communicate with the patient of blood work results to titrate medications as he works night time. Course c/b PE on Eliquis and heart failure with EF of 20-25%. Pending cards f/u.  [ ] MDS/MPN overlap syndrome - Dyserythropoiesis (ring sideroblast) myelofibrosis/thrombocytosis (SF3B1 and JAK2 positive)  - JAK2 assay from 39.7% (Nov 2023) >> 47% (April 2024) - Obtained an abdominal ultrasound - splenomegaly (19.3cm) - On Luspatercept 1 mg/kg Q3 weeks, titrated up to 1.33mg/kg due to poor response in February 2024 - Currently on Jakafi 20mg BID however platelets still harboring in 900k range - Despite high platelets, spleen no longer palpable - Titrate up on Jakafi 25mg BID for thrombocytosis (4/10/24) - Pt w/ symptomatic anemia w/ Hgb of 7.9 today - Discontinue Jakafi  - Start Anagrelide 0.5mg BID  - Continue ASA 81 mg daily - Inc Luspatercept to 1.75mg/kg q3 weeks  - Added Ojjaara 200mg daily in June 2024 - Self discontinued all medications - Admitted for heart failure EF of 25% - As per inpatient cards team, HF can be r/t Ojjaara, hold for now  - Should continue Imatinib, Anagrelide, Hydrea, Eliquis - Luspatercept if Hgb < 11 - Obtain spleen US ; scheduled for 9/4 - CBC today w/ platelet > 1 mil -> Resume Hydrea - Continue weekly CBCs  [ ] BCR::ABL1 positive CML  - p190 > 10% (11/2023) >>> 2.050% (4/2024) - Pt should continually be on Imatinib but w/ poor compliance.. reviewed again that he should be taking Jakafi, Imatinib and ASA  - BCR:ABL PCR pending today - BCR::ABL PCR Q3 months (In October) - Continue Imatinib 400mg daily  [ ] PE - PE diagnosed in May after experiencing SOB  - Discharged on Eliquis and ASA from hospital - Completed loading dose Eliquis then self discontinued due to oral bleed - Restart Eliquis maintenance dose, Rx sent - Hold ASA while on Eliquis - Pt to call and let us know if he experiences bleeds again  [ ] Leg swelling, RESOLVED - B/l lower ext swelling and weakness - No erythema, pain, warmth to touch - Notes improvement with elevation - Lasix 40mg daily x 7 days  - Compression stockings encouraged  ** Of side note, pt reports that he is 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 Mayers

## 2024-08-30 NOTE — RESULTS/DATA
[FreeTextEntry1] : 8/30/2024 WBC WNL Hgb 11.5 Platelet 1013k  (improved)  7/31/2024 WBC normal Hgb 11.6, platelets 577k UA 11.6 LDH 1220  5/20/2024 Hgb 7.9, thrombocytosis 795k Pending CMP, LDH, UA, cardiac enzymes, T&S EKG - NSR w/ t wave abnormality HR 91  4/10/24 Hgb 10.5 Platelet 925k  1/5/24 HGB improved Platelet count decreased 704K < 900K  12/13/23 WBC 5.96 Hgb 8.6 >> 9.5 >> 9.9 >> 10.4 Platelet 450k Pending CMP, LDH, Ferritin   11/15/2023 BCR Final Report  Accession Number: [id number] Date Collected: 11/08/2023 Date Received: 11/09/2023 Date Reported: 11/10/2023 15:40  Specimen: .Blood Indication:  CML/ALL  Test Performed: Quantitative BCR-ABL by real time RT-PCR ________________________________________________________________  RESULTS:   p210: Not Detected   p190: Positive  %BCR-ABL/ABL= >10.000 %   INTERPRETATION:  The specimen analyzed contains the translocation mBCR-ABL1 (p190) associated with Acute Lymphoblastic leukemia. Clinical correlation is recommended.   	 Patient:     BRUCE BAIN   Accession:                             [id number]  Collected Date/Time:                   10/16/2023 17:15 EDT Received Date/Time:                    10/16/2023 20:36 EDT  Hematopathology Report - Auth (Verified)  Specimen(s) Submitted 1  Left pic 2  Bone marrow aspirate  Final Diagnosis 1, 2. Bone marrow biopsy and bone marrow aspirate      - Myeloid neoplasm with concurrent BCR-ABL1 translocation, and  JAK2 and SF3B1 co-mutations and severe fibrosis  See note and description.  Diagnostic note: Per chart review, patient has normocytic anemia with folic acid deficiency which has been corrected now and thrombocytosis in the range of 470K to 600K since December 2021. His hemoglobin levels are trending down from 12.6g/dL to 8.1g/dL. This is the first  bone marrow biopsy and patient has never been treated. Biopsy shows overtly fibrotic marrow with maturing trilineage hematopoiesis, markedly increased megakaryopoiesis and dyserythropoiesis (ring sideroblasts). There is no increase in blast population. Karyotype analysis shows t(9;22)(q34;q11.2) in 9 out of 20 metaphases.  Molecular studies show  JAK2 p.[id number] and   SF3B1 p.Ehb822Esu mutation. Overall the    findings are consistent with myeloid neoplasm with concurrent BCR-ABL1 translocation, and  JAK2 and SF3B1 co-mutations and severe fibrosis. 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 However the detection of BCR/ABL1 translocation by chromosome analysis is a   diagnostic feature of chronic myeloid leukemia.  Co-occurance of  BCR-ABL1 and JAK2 V617F  mutations have been described in small case series. The frequency of co-occurrence, the temporal order of acquisition and clonal relationship of these alterations is poorly understood and it is also unclear for this case since there is no prior bone marrow  biopsy or diagnostic work up. Literature of these rare cases shows many patients progress  to myelofibrosis like our patient, raising the possibility that the two mutant tyrosine    kinases may accelerate disease progression.  Clinical correlation is recommended.  Case is discussed with Dr Jose Mayers via phone call on 10/27/2023 3:30 pm  Microscopic description: 1. Biopsy: Quality:  Small (0.9 cm) core biopsy Cellularity:  30% (patchy cellularity with hematopoiesis alternating with hypocellular   areas of fibrotic stroma)    Myeloid lineage:  Decreased in number, exhibits full    maturation    Erythroid lineage:  Decreased in number, exhibits full    maturation with increased pronormoblasts    Megakaryocytes:  Markedly increased in number, atypical    megakaryocytes  (some small some enlarged with    multilobated nuclei, some naked nuclei ) forming large    clusters in the marrow and also within dilated sinusoids    Blasts:  Not increased    Lymphocytes  Not increased in number, mostly small mature    looking lymphocytes are seen    Plasma cells:  Not increased    Stroma:   Marked fibrosis with dilated sinusoids and    intrasinusoidal hematopoiesis    Clot:   No marrow fragments     2. Aspirate:    Myeloid lineage:  Exhibits full maturation, no    significant dysplastic        changes seen     Erythroid lineage:  Exhibit full maturation with    increased pronormoblasts, no significant dysplastic    changes seen Megakaryocytes:  Rare Lymphocytes:  Not increased Plasma cells:   Not increased Blasts:    Not increased  Bone Marrow Asp Smear Diff Cell Count:  200 Cells. Type  Normal* % Blast  0-3 0% Promyelocyte  1-8 0% Myelocyte  10-15 3% Metamyelocyte  10-15 3% Band/Neutrophil  12-25  37% Eosinophil  1-5 1% Basophil  0-1 0% Pronormoblast  0-2 4% Normoblast  15-25 35% Monocyte  0-2 0% Lymphocyte  10-15 17% Plasma cell  0-1 0%   *Adult Range  Peripheral Blood Smear:   Not available  Ancillary studies Special stains on bone marrow aspirate: Iron stain:  No spicules are present to evaluate for iron stores. There is increase in ring sideroblasts (13%).  Special stains on core biopsy: Reticulin:  Diffuse and dense increase in reticulin with extensive intersections and coarse bundles of thick fibers consistent with collagen (MF=3) Trichrome:  Collagen fibrosis Iron:  Positive  Immunohistochemical stains:    CD34, , MPO, Factor VIII, CD71, E-cad, p53 stains are performed on block 1A  CD34:  Highlights vascular structures, no increase in blast population :  Highlights  scattered masts cells. No increase in blast population MPO:  Highlights myeloid series, decreased Factor VIII:  Highlights megakaryocytes, markedly increased, including occasional small forms CD71 : Highlights erythroid series, decreased E-cad:  Highlights early erythroid series, increased p53:  Normal staining pattern  Flow cytometry analysis (12-WX-):    CD45/side scatter shows myeloblast population (1.21% of total) with normal antigen maturation pattern.  There is no increase in CD34, CD14 or  positive cells. Myeloid antigen pattern is normal with CD13, CD16 and CD11b. Monocytes show normal antigen maturation pattern Lymphocytes (11% of cells): Heterogeneous population of T-cells (with a  CD4 to CD8 ratio 2.31) with no aberrancy or loss of pan-T cell antigen, immunophenotypically unremarkable natural killer cells, and polytypic B-cells  Cytogenetics: Karyotype ([id number]) :   46,XY,t(9;22)(q34;q11.2)[9]/46,XY[11]  Nine of the twenty metaphases examined revealed a translocation involving the long arms of chromosomes 9 and 22 giving rise to the classic West[place] chromosome.   The remaining metaphases had an apparently normal karyotype.  Please note that the Initial FISH studies showed normal results for BCR::ABL1 fusion however, subsequent g-banded karyotyping showed a translocation involving chromosomes 9 and 22, with a variant of the classic t(9;22)(q34;q11.2). Therefore, sequential FISH studies were performed using   A  dual-fusion three color probe for BCR and ABL1/ASS1, which showed an atypical signal pattern with A a single BCR::ABL1 fusion signal on alley(22) homologue,    one orange, one green and one aqua instead of two fusion, one orange, one green and two aqua.  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  Additionally, retrospective and repeat FISH studies using dual-fusion three color probe for BCR and ABL1/ASS1 in interphase cells still showed normal results due to the reason mentioned above.  Fluorescence in situ Hybridization ([id number], [id number], [id number]): NORMAL FISH - BCR::ABL1 NORMAL FISH - 5q NORMAL FISH - MPD PANEL  Probe(s) and Location(s): ASS1, ABL1 (9q34), BCR (22q11.2) ISCN Nomenclature: nuc zak(ASS1,ABL1,BCR)x2[194/200] Probe(s) and Location(s):   V6Z090/ D5S23 (5p15.2), EGR1 (5q31) ISCN Nomenclature:  nuc zak(L7W786/D5S23,EGR1)x2[198/200] Probe(s) and Location(s):   PDGFRA LSI 4q12 (4q12) ([employer]), PDGFRB (2e48-x51) ([employer]), FGFR1 (8p11.23-p11.2), CEP 8/D8Z2(8p11.1-q11.1) ([employer]) ISCN Nomenclature:  nuc zak(SCFD2,LNX,PDGFRA)x2[198/200],(PDGFRBx2)[197/200 ], (FGFR1,D8Z2)x2[198/200]  Molecular Studies: [facility] Advanced NGS Myeloid Panel  DETECTED GENOMIC ALTERATIONS:   Tier I: Variants of Strong Clinical Significance   JAK2 p.Fbw641Uwx  Chr: 9   Pos: [id number]   Ref: G   Alt: T    Coverage: 792    Allele Freq: 46.34   cDNA change: c.1849G>T   Exon: 14   ClinVar ID: -    SF3B1 p.Aym040Zpm   Chr: 2   Pos: [id number]   Ref: T   Alt: C   Coverage: 613   Allele Freq: 43.88   cDNA change: c.2098A>G   Exon: 15   ClinVar ID: -    Tier II: Variants of Potential Clinical Significance   ATRX p.[id number]   Chr: X   Pos: [id number]   Ref: CT   Alt: C   Coverage: 673   Allele Freq: 5.65   cDNA change: c.6848del   Exon: 31   ClinVar ID: -  PERTINENT NEGATIVE RESULTS:   The following genes are NEGATIVE for clinically relevant mutations.   Mutational hotspots and surrounding exonic regions were interrogated   for DNA level point mutations and indels (fusions not assayed).   ABL1, ANKRD26, ASXL1, BCOR, BCORL1, BRAF, CALR, CBL, CCND2, CDKN2A,   CEBPA, CSF3R, CUX1, DDX41, DNMT3A, ETNK1, ETV6, EZH2, FBXW7, FLT3,   GATA2, HRAS, IDH1, IDH2, KDM6A, KIT, KMT2A, KRAS, MAP2K1, MPL,   MYD88, NF1, NPM1, NRAS, PDGFRA, PHF6, PTEN, PTPN11, RUNX1, SETBP1,   SRSF2, STAG2, TET2, TP53, U2AF1, WT1, ZRSR2  JAK2  V617F Activating Mutation Assay by Real-Time PCR ([id number]): Positive JAK2 V617F %: 39.7 %  Comment: Iron stain (examined to evaluate for iron stores, see microscopic description) and Giemsa stain (shows appropriate staining pattern) are performed on blocks 1A, 1B  Verified by: Michael Herman MD (Electronic Signature) Reported on: 11/03/23 12:39 EDT, [facility] [place] [address], [place] [address] Phone: [phone]   Fax: [phone] _________________________________________________________________  Clinical Information *** The external document could not be loaded. ***   Gross Description 1. The specimen is received in Bouins fixative and the specimen container is labeled    "L Pic". It consists of one bone marrow core measuring 0.9 x 0.2 cm with a 0.8 x 0.8 x 0.1 cm aggregate of clotted blood. Entirely submitted in 2 cassettes:   1A: Bone marrow core   1B: Blood clot  2. Two Barba-Giemsa and one iron stained bone marrow aspirate smears are submitted.  Tia CANTU 10/17/2023 06:36 AM.  Disclaimer In addition to other data that may appear on the specimen containers, all labels have been inspected to confirm the presence of the patients name and date of birth.  Histological Processing Performed at [facility], Department of Pathology, [address] [place] [address].    10/11/2023 Normocytic anemia - Hgb 8.5 Thrombocytosis Plt 573 Manual differential reportedly showed 2-3% blasts    2/4/2022  Available labs reviewed, relevant data as below: Anemia, Hgb 8.6, normal MCV (84); no retic response, Normal iron studies, high ferritin (485),  Low Folic acid  (3.3), normal  B12 Thrombocytosis (506).   LDH high at ~750  Haptoglobin normal range.

## 2024-08-30 NOTE — HISTORY OF PRESENT ILLNESS
[de-identified] : 71 year-old Mr. BAIN is seen in consult for "anemia, thrombocytosis, r/o myelofibrosis". Patient states that since Sept 2021 he has been feeling like flies crawling inside his head when he is smelling food or eating; then the flies crawl to his legs. He went to his PCP for this symptom where after a lot of blood work he was found to be anemic, folate deficient. He has been on folic acid since mid January. Lab work also showed thrombocytosis (506). His Hgb was 8.6, MCV normal range, no  retic response, normal iron studies, high ferritin (485), normal  B12. Of note LDH was high at ~775, haptoglobin was normal range (~75). The patient denies any fatigue, weight loss, night sweats, fever. He has no complaints other than the "flies" which have long been abundant in his apartment and now they are crawling in his brain. Of note, patient has no other medical condition and he exercises regularly.   [de-identified] : 10/11/2023 Patient presents for a follow up. He was initially seen in 02/2022. He never returned for a follow up. He has done well in the interim. Denies any major change in his health status. He returned for a follow up as his PCP insisted him so. He admits to some fatigue. Denies any weight loss or early satiety. He denies any abnormal bleeding or thrombosis.   11/145/2023 Patient returns for a follow up and discuss the lab and biopsy results. He endorses no changes in his health status. He had a bone marrow biopsy done in the interim. most of the results are now available. Detailed results are copied in the Results section.   12/13/23 Patient seen in follow up. Since previous visit, he reported that he has not picked up Jakafi or Gleevec yet.. Re-iterated to the pt how important it is to start his medications ASAP to control his disease. He has started Luspatercept and responding well. Today his Hgb is 10.4! Denies fevers, night sweats, unintentional weight loss. No changes in health. Good appetite, stable weight   1/3/23 Patient seen in follow up. Started Jakafi and Gleevec, on Luspatercept plan for treatment today. Hgb today 10.2, platelets 704 (improvement from prior level of 900s). Patient denies any joint pains, no recent infections, denies fevers, night sweats, unintentional weight loss. No changes in health. Good appetite, stable weight.   4/10/2024 Patient seen in follow up. We spoke last on 2/2 in regards to his new POC which was to restart Imatinib 400mg (renewed as per his request), and continue Jakafi 20mg BID and ASA 81mg. HOLD Hydrea for now. We will titrate up on his Luspatercept to 1.33mg/kg as he was not responding well. There has been many challenges with communicating with the pt as he works night shifts. We have left multiple VMs to discuss BW results however was unable to reach him. Today his Hgb has improved but platelets remain high at 925k. He noticed that his spleen size has decreased. He notes some headaches, relieved with Tylenol  5/20/2024 Patient seen in urgent visit for SOB. As per pt he has been experiencing SOB with exertion since increasing Jakafi dose to 25mg BID. He has been experiencing SOB. + Fatigue. Some left sided chest pressure with exertion. He appears well, can carry a conversation without difficulty breathing. Denies dizziness, chest pain, BRBPR, melena, hematuria. No visible blood loss. His Hgb today is 7.9 - he has self discontinued Luspatercept and his last dose was 4/17. He will be receiving Luspatercept today at an increased titrated dose of 1.75mg/kg. His Jakafi has been decreased to 15mg BID- pt stopped since 5/14. Due to start Anagrelide 0.5mg BID today  7/31/2024 Patient seen in follow up. It has been very hard to reach him to follow up and share treatment plans. Interim, he has been hospitalized for PE and discharged with both ASA and Eliquis. As per pt, developed bleeding from his throat and discontinued both ASA and Eliquis without any consultation with the medical team. He has been having increased fatigue, sleeping for more than 12 hrs, continued headache, ankle swelling. He has self continued Imatinib, Jakafi, Anagrelide, ASA, Eliquis  8/28/2024 Patient seen in follow up. Interim, he was hospitalized from 8/14-8/19 for heart failure. TTE revealed EF of 20-25%. LHC on 8/18 showed non obstructive CAD, RHC showed evidence of volume down. Discharged with Entresto and pending f/u w/ Dr. Oneal. [facility]. He feels much better since being discharged - he is able to sleep, leg swelling improved. He is unsure which medications he is currently on. His platelet count was over a million again. We spent a great deal amount of time going over all the heme medications he should be on - Hydrea, Imatinib, Anagrelide, ASA, Eliquis. He was sent home with a written copy of the medication list he should be on. We will hold Luspatercept as his Hgb is > 11 today

## 2024-08-30 NOTE — HISTORY OF PRESENT ILLNESS
[de-identified] : 71 year-old Mr. BAIN is seen in consult for "anemia, thrombocytosis, r/o myelofibrosis". Patient states that since Sept 2021 he has been feeling like flies crawling inside his head when he is smelling food or eating; then the flies crawl to his legs. He went to his PCP for this symptom where after a lot of blood work he was found to be anemic, folate deficient. He has been on folic acid since mid January. Lab work also showed thrombocytosis (506). His Hgb was 8.6, MCV normal range, no  retic response, normal iron studies, high ferritin (485), normal  B12. Of note LDH was high at ~775, haptoglobin was normal range (~75). The patient denies any fatigue, weight loss, night sweats, fever. He has no complaints other than the "flies" which have long been abundant in his apartment and now they are crawling in his brain. Of note, patient has no other medical condition and he exercises regularly.   [de-identified] : 10/11/2023 Patient presents for a follow up. He was initially seen in 02/2022. He never returned for a follow up. He has done well in the interim. Denies any major change in his health status. He returned for a follow up as his PCP insisted him so. He admits to some fatigue. Denies any weight loss or early satiety. He denies any abnormal bleeding or thrombosis.   11/145/2023 Patient returns for a follow up and discuss the lab and biopsy results. He endorses no changes in his health status. He had a bone marrow biopsy done in the interim. most of the results are now available. Detailed results are copied in the Results section.   12/13/23 Patient seen in follow up. Since previous visit, he reported that he has not picked up Jakafi or Gleevec yet.. Re-iterated to the pt how important it is to start his medications ASAP to control his disease. He has started Luspatercept and responding well. Today his Hgb is 10.4! Denies fevers, night sweats, unintentional weight loss. No changes in health. Good appetite, stable weight   1/3/23 Patient seen in follow up. Started Jakafi and Gleevec, on Luspatercept plan for treatment today. Hgb today 10.2, platelets 704 (improvement from prior level of 900s). Patient denies any joint pains, no recent infections, denies fevers, night sweats, unintentional weight loss. No changes in health. Good appetite, stable weight.   4/10/2024 Patient seen in follow up. We spoke last on 2/2 in regards to his new POC which was to restart Imatinib 400mg (renewed as per his request), and continue Jakafi 20mg BID and ASA 81mg. HOLD Hydrea for now. We will titrate up on his Luspatercept to 1.33mg/kg as he was not responding well. There has been many challenges with communicating with the pt as he works night shifts. We have left multiple VMs to discuss BW results however was unable to reach him. Today his Hgb has improved but platelets remain high at 925k. He noticed that his spleen size has decreased. He notes some headaches, relieved with Tylenol  5/20/2024 Patient seen in urgent visit for SOB. As per pt he has been experiencing SOB with exertion since increasing Jakafi dose to 25mg BID. He has been experiencing SOB. + Fatigue. Some left sided chest pressure with exertion. He appears well, can carry a conversation without difficulty breathing. Denies dizziness, chest pain, BRBPR, melena, hematuria. No visible blood loss. His Hgb today is 7.9 - he has self discontinued Luspatercept and his last dose was 4/17. He will be receiving Luspatercept today at an increased titrated dose of 1.75mg/kg. His Jakafi has been decreased to 15mg BID- pt stopped since 5/14. Due to start Anagrelide 0.5mg BID today  7/31/2024 Patient seen in follow up. It has been very hard to reach him to follow up and share treatment plans. Interim, he has been hospitalized for PE and discharged with both ASA and Eliquis. As per pt, developed bleeding from his throat and discontinued both ASA and Eliquis without any consultation with the medical team. He has been having increased fatigue, sleeping for more than 12 hrs, continued headache, ankle swelling. He has self continued Imatinib, Jakafi, Anagrelide, ASA, Eliquis  8/28/2024 Patient seen in follow up. Interim, he was hospitalized from 8/14-8/19 for heart failure. TTE revealed EF of 20-25%. LHC on 8/18 showed non obstructive CAD, RHC showed evidence of volume down. Discharged with Entresto and pending f/u w/ Dr. Oneal. [facility]. He feels much better since being discharged - he is able to sleep, leg swelling improved. He is unsure which medications he is currently on. His platelet count was over a million again. We spent a great deal amount of time going over all the heme medications he should be on - Hydrea, Imatinib, Anagrelide, ASA, Eliquis. He was sent home with a written copy of the medication list he should be on. We will hold Luspatercept as his Hgb is > 11 today

## 2024-08-30 NOTE — CONSULT LETTER
[Dear  ___] : Dear  [unfilled], [Consult Letter:] : I had the pleasure of evaluating your patient, [unfilled]. [Please see my note below.] : Please see my note below. [Consult Closing:] : Thank you very much for allowing me to participate in the care of this patient.  If you have any questions, please do not hesitate to contact me. [Sincerely,] : Sincerely, [FreeTextEntry3] : Jose Mayers MD, PhD, MS \par  Attending Physician \par  Hematology-Oncology \par  [facility]\par

## 2024-08-30 NOTE — RESULTS/DATA
[FreeTextEntry1] : 8/30/2024 WBC WNL Hgb 11.5 Platelet 1013k  (improved)  7/31/2024 WBC normal Hgb 11.6, platelets 577k UA 11.6 LDH 1220  5/20/2024 Hgb 7.9, thrombocytosis 795k Pending CMP, LDH, UA, cardiac enzymes, T&S EKG - NSR w/ t wave abnormality HR 91  4/10/24 Hgb 10.5 Platelet 925k  1/5/24 HGB improved Platelet count decreased 704K < 900K  12/13/23 WBC 5.96 Hgb 8.6 >> 9.5 >> 9.9 >> 10.4 Platelet 450k Pending CMP, LDH, Ferritin   11/15/2023 BCR Final Report  Accession Number: [id number] Date Collected: 11/08/2023 Date Received: 11/09/2023 Date Reported: 11/10/2023 15:40  Specimen: .Blood Indication:  CML/ALL  Test Performed: Quantitative BCR-ABL by real time RT-PCR ________________________________________________________________  RESULTS:   p210: Not Detected   p190: Positive  %BCR-ABL/ABL= >10.000 %   INTERPRETATION:  The specimen analyzed contains the translocation mBCR-ABL1 (p190) associated with Acute Lymphoblastic leukemia. Clinical correlation is recommended.   	 Patient:     BRUCE BAIN   Accession:                             [id number]  Collected Date/Time:                   10/16/2023 17:15 EDT Received Date/Time:                    10/16/2023 20:36 EDT  Hematopathology Report - Auth (Verified)  Specimen(s) Submitted 1  Left pic 2  Bone marrow aspirate  Final Diagnosis 1, 2. Bone marrow biopsy and bone marrow aspirate      - Myeloid neoplasm with concurrent BCR-ABL1 translocation, and  JAK2 and SF3B1 co-mutations and severe fibrosis  See note and description.  Diagnostic note: Per chart review, patient has normocytic anemia with folic acid deficiency which has been corrected now and thrombocytosis in the range of 470K to 600K since December 2021. His hemoglobin levels are trending down from 12.6g/dL to 8.1g/dL. This is the first  bone marrow biopsy and patient has never been treated. Biopsy shows overtly fibrotic marrow with maturing trilineage hematopoiesis, markedly increased megakaryopoiesis and dyserythropoiesis (ring sideroblasts). There is no increase in blast population. Karyotype analysis shows t(9;22)(q34;q11.2) in 9 out of 20 metaphases.  Molecular studies show  JAK2 p.Jad875Tyc and   SF3B1 p.Pks060Bgn mutation. Overall the    findings are consistent with myeloid neoplasm with concurrent BCR-ABL1 translocation, and  JAK2 and SF3B1 co-mutations and severe fibrosis. 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 However the detection of BCR/ABL1 translocation by chromosome analysis is a   diagnostic feature of chronic myeloid leukemia.  Co-occurance of  BCR-ABL1 and JAK2 V617F  mutations have been described in small case series. The frequency of co-occurrence, the temporal order of acquisition and clonal relationship of these alterations is poorly understood and it is also unclear for this case since there is no prior bone marrow  biopsy or diagnostic work up. Literature of these rare cases shows many patients progress  to myelofibrosis like our patient, raising the possibility that the two mutant tyrosine    kinases may accelerate disease progression.  Clinical correlation is recommended.  Case is discussed with Dr Jose Mayers via phone call on 10/27/2023 3:30 pm  Microscopic description: 1. Biopsy: Quality:  Small (0.9 cm) core biopsy Cellularity:  30% (patchy cellularity with hematopoiesis alternating with hypocellular   areas of fibrotic stroma)    Myeloid lineage:  Decreased in number, exhibits full    maturation    Erythroid lineage:  Decreased in number, exhibits full    maturation with increased pronormoblasts    Megakaryocytes:  Markedly increased in number, atypical    megakaryocytes  (some small some enlarged with    multilobated nuclei, some naked nuclei ) forming large    clusters in the marrow and also within dilated sinusoids    Blasts:  Not increased    Lymphocytes  Not increased in number, mostly small mature    looking lymphocytes are seen    Plasma cells:  Not increased    Stroma:   Marked fibrosis with dilated sinusoids and    intrasinusoidal hematopoiesis    Clot:   No marrow fragments     2. Aspirate:    Myeloid lineage:  Exhibits full maturation, no    significant dysplastic        changes seen     Erythroid lineage:  Exhibit full maturation with    increased pronormoblasts, no significant dysplastic    changes seen Megakaryocytes:  Rare Lymphocytes:  Not increased Plasma cells:   Not increased Blasts:    Not increased  Bone Marrow Asp Smear Diff Cell Count:  200 Cells. Type  Normal* % Blast  0-3 0% Promyelocyte  1-8 0% Myelocyte  10-15 3% Metamyelocyte  10-15 3% Band/Neutrophil  12-25  37% Eosinophil  1-5 1% Basophil  0-1 0% Pronormoblast  0-2 4% Normoblast  15-25 35% Monocyte  0-2 0% Lymphocyte  10-15 17% Plasma cell  0-1 0%   *Adult Range  Peripheral Blood Smear:   Not available  Ancillary studies Special stains on bone marrow aspirate: Iron stain:  No spicules are present to evaluate for iron stores. There is increase in ring sideroblasts (13%).  Special stains on core biopsy: Reticulin:  Diffuse and dense increase in reticulin with extensive intersections and coarse bundles of thick fibers consistent with collagen (MF=3) Trichrome:  Collagen fibrosis Iron:  Positive  Immunohistochemical stains:    CD34, , MPO, Factor VIII, CD71, E-cad, p53 stains are performed on block 1A  CD34:  Highlights vascular structures, no increase in blast population :  Highlights  scattered masts cells. No increase in blast population MPO:  Highlights myeloid series, decreased Factor VIII:  Highlights megakaryocytes, markedly increased, including occasional small forms CD71 : Highlights erythroid series, decreased E-cad:  Highlights early erythroid series, increased p53:  Normal staining pattern  Flow cytometry analysis (07-PY-):    CD45/side scatter shows myeloblast population (1.21% of total) with normal antigen maturation pattern.  There is no increase in CD34, CD14 or  positive cells. Myeloid antigen pattern is normal with CD13, CD16 and CD11b. Monocytes show normal antigen maturation pattern Lymphocytes (11% of cells): Heterogeneous population of T-cells (with a  CD4 to CD8 ratio 2.31) with no aberrancy or loss of pan-T cell antigen, immunophenotypically unremarkable natural killer cells, and polytypic B-cells  Cytogenetics: Karyotype ([id number]) :   46,XY,t(9;22)(q34;q11.2)[9]/46,XY[11]  Nine of the twenty metaphases examined revealed a translocation involving the long arms of chromosomes 9 and 22 giving rise to the classic [place] chromosome.   The remaining metaphases had an apparently normal karyotype.  Please note that the Initial FISH studies showed normal results for BCR::ABL1 fusion however, subsequent g-banded karyotyping showed a translocation involving chromosomes 9 and 22, with a variant of the classic t(9;22)(q34;q11.2). Therefore, sequential FISH studies were performed using   A  dual-fusion three color probe for BCR and ABL1/ASS1, which showed an atypical signal pattern with A a single BCR::ABL1 fusion signal on alley(22) homologue,    one orange, one green and one aqua instead of two fusion, one orange, one green and two aqua.  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  Additionally, retrospective and repeat FISH studies using dual-fusion three color probe for BCR and ABL1/ASS1 in interphase cells still showed normal results due to the reason mentioned above.  Fluorescence in situ Hybridization ([id number], [id number], [id number]): NORMAL FISH - BCR::ABL1 NORMAL FISH - 5q NORMAL FISH - MPD PANEL  Probe(s) and Location(s): ASS1, ABL1 (9q34), BCR (22q11.2) ISCN Nomenclature: nuc zak(ASS1,ABL1,BCR)x2[194/200] Probe(s) and Location(s):   S9C086/ D5S23 (5p15.2), EGR1 (5q31) ISCN Nomenclature:  nuc zak(Z2W046/D5S23,EGR1)x2[198/200] Probe(s) and Location(s):   PDGFRA LSI 4q12 (4q12) ([employer]), PDGFRB (8x78-g58) ([employer]), FGFR1 (8p11.23-p11.2), CEP 8/D8Z2(8p11.1-q11.1) ([employer]) ISCN Nomenclature:  nuc zak(SCFD2,LNX,PDGFRA)x2[198/200],(PDGFRBx2)[197/200 ], (FGFR1,D8Z2)x2[198/200]  Molecular Studies: [facility] Advanced NGS Myeloid Panel  DETECTED GENOMIC ALTERATIONS:   Tier I: Variants of Strong Clinical Significance   JAK2 p.Xku046Jlq  Chr: 9   Pos: [id number]   Ref: G   Alt: T    Coverage: 792    Allele Freq: 46.34   cDNA change: c.1849G>T   Exon: 14   ClinVar ID: -    SF3B1 p.Uem311Lvv   Chr: 2   Pos: [id number]   Ref: T   Alt: C   Coverage: 613   Allele Freq: 43.88   cDNA change: c.2098A>G   Exon: 15   ClinVar ID: -    Tier II: Variants of Potential Clinical Significance   ATRX p.[id number]   Chr: X   Pos: [id number]   Ref: CT   Alt: C   Coverage: 673   Allele Freq: 5.65   cDNA change: c.6848del   Exon: 31   ClinVar ID: -  PERTINENT NEGATIVE RESULTS:   The following genes are NEGATIVE for clinically relevant mutations.   Mutational hotspots and surrounding exonic regions were interrogated   for DNA level point mutations and indels (fusions not assayed).   ABL1, ANKRD26, ASXL1, BCOR, BCORL1, BRAF, CALR, CBL, CCND2, CDKN2A,   CEBPA, CSF3R, CUX1, DDX41, DNMT3A, ETNK1, ETV6, EZH2, FBXW7, FLT3,   GATA2, HRAS, IDH1, IDH2, KDM6A, KIT, KMT2A, KRAS, MAP2K1, MPL,   MYD88, NF1, NPM1, NRAS, PDGFRA, PHF6, PTEN, PTPN11, RUNX1, SETBP1,   SRSF2, STAG2, TET2, TP53, U2AF1, WT1, ZRSR2  JAK2  V617F Activating Mutation Assay by Real-Time PCR ([id number]): Positive JAK2 V617F %: 39.7 %  Comment: Iron stain (examined to evaluate for iron stores, see microscopic description) and Giemsa stain (shows appropriate staining pattern) are performed on blocks 1A, 1B  Verified by: Michael Herman MD (Electronic Signature) Reported on: 11/03/23 12:39 EDT, [facility] [place] [address], [place] [address] Phone: [phone]   Fax: [phone] _________________________________________________________________  Clinical Information *** The external document could not be loaded. ***   Gross Description 1. The specimen is received in Bouins fixative and the specimen container is labeled    "L Pic". It consists of one bone marrow core measuring 0.9 x 0.2 cm with a 0.8 x 0.8 x 0.1 cm aggregate of clotted blood. Entirely submitted in 2 cassettes:   1A: Bone marrow core   1B: Blood clot  2. Two Barba-Giemsa and one iron stained bone marrow aspirate smears are submitted.  Tia CANTU 10/17/2023 06:36 AM.  Disclaimer In addition to other data that may appear on the specimen containers, all labels have been inspected to confirm the presence of the patients name and date of birth.  Histological Processing Performed at [facility], Department of Pathology, [address] [place] [address].    10/11/2023 Normocytic anemia - Hgb 8.5 Thrombocytosis Plt 573 Manual differential reportedly showed 2-3% blasts    2/4/2022  Available labs reviewed, relevant data as below: Anemia, Hgb 8.6, normal MCV (84); no retic response, Normal iron studies, high ferritin (485),  Low Folic acid  (3.3), normal  B12 Thrombocytosis (506).   LDH high at ~750  Haptoglobin normal range.

## 2024-08-30 NOTE — END OF VISIT
[] : Fellow [Time Spent: ___ minutes] : I have spent [unfilled] minutes of time on the encounter which excludes teaching and separately reported services. [FreeTextEntry3] : Patient seen with fellow who documented the visit. I reviewed and edited the note as needed.

## 2024-08-30 NOTE — PHYSICAL EXAM
[Fully active, able to carry on all pre-disease performance without restriction] : Status 0 - Fully active, able to carry on all pre-disease performance without restriction [Normal] : affect appropriate [de-identified] : no longer palpale spleen, palpable spleen 5-6 cm below the costal margin during January visit

## 2024-08-30 NOTE — REVIEW OF SYSTEMS
[Fatigue] : fatigue [Lower Ext Edema] : lower extremity edema [Negative] : Allergic/Immunologic [Fever] : no fever [Chills] : no chills [Night Sweats] : no night sweats [Recent Change In Weight] : ~T no recent weight change [Chest Pain] : no chest pain [Palpitations] : no palpitations [Leg Claudication] : no intermittent leg claudication

## 2024-09-03 ENCOUNTER — APPOINTMENT (OUTPATIENT)
Dept: HEMATOLOGY ONCOLOGY | Facility: CLINIC | Age: 74
End: 2024-09-03

## 2024-09-04 ENCOUNTER — APPOINTMENT (OUTPATIENT)
Dept: OPHTHALMOLOGY | Facility: CLINIC | Age: 74
End: 2024-09-04

## 2024-09-04 ENCOUNTER — RESULT REVIEW (OUTPATIENT)
Age: 74
End: 2024-09-04

## 2024-09-04 ENCOUNTER — APPOINTMENT (OUTPATIENT)
Dept: HEMATOLOGY ONCOLOGY | Facility: CLINIC | Age: 74
End: 2024-09-04

## 2024-09-04 LAB
ACANTHOCYTES BLD QL SMEAR: SLIGHT — SIGNIFICANT CHANGE UP
ANISOCYTOSIS BLD QL: SIGNIFICANT CHANGE UP
BASOPHILS # BLD AUTO: 0 K/UL — SIGNIFICANT CHANGE UP (ref 0–0.2)
BASOPHILS NFR BLD AUTO: 0 % — SIGNIFICANT CHANGE UP (ref 0–2)
BLASTS # FLD: 1 % — HIGH (ref 0–0)
DACRYOCYTES BLD QL SMEAR: SLIGHT — SIGNIFICANT CHANGE UP
ELLIPTOCYTES BLD QL SMEAR: SLIGHT — SIGNIFICANT CHANGE UP
EOSINOPHIL # BLD AUTO: 0.24 K/UL — SIGNIFICANT CHANGE UP (ref 0–0.5)
EOSINOPHIL NFR BLD AUTO: 3 % — SIGNIFICANT CHANGE UP (ref 0–6)
GIANT PLATELETS BLD QL SMEAR: PRESENT — SIGNIFICANT CHANGE UP
HCT VFR BLD CALC: 38.1 % — LOW (ref 39–50)
HGB BLD-MCNC: 12.4 G/DL — LOW (ref 13–17)
LG PLATELETS BLD QL AUTO: SIGNIFICANT CHANGE UP
LYMPHOCYTES # BLD AUTO: 2.24 K/UL — SIGNIFICANT CHANGE UP (ref 1–3.3)
LYMPHOCYTES # BLD AUTO: 28 % — SIGNIFICANT CHANGE UP (ref 13–44)
MCHC RBC-ENTMCNC: 26.3 PG — LOW (ref 27–34)
MCHC RBC-ENTMCNC: 32.5 G/DL — SIGNIFICANT CHANGE UP (ref 32–36)
MCV RBC AUTO: 80.9 FL — SIGNIFICANT CHANGE UP (ref 80–100)
METAMYELOCYTES # FLD: 2 % — HIGH (ref 0–0)
MONOCYTES # BLD AUTO: 0.64 K/UL — SIGNIFICANT CHANGE UP (ref 0–0.9)
MONOCYTES NFR BLD AUTO: 8 % — SIGNIFICANT CHANGE UP (ref 2–14)
MYELOCYTES NFR BLD: 4 % — HIGH (ref 0–0)
NEUTROPHILS # BLD AUTO: 4.33 K/UL — SIGNIFICANT CHANGE UP (ref 1.8–7.4)
NEUTROPHILS NFR BLD AUTO: 54 % — SIGNIFICANT CHANGE UP (ref 43–77)
NRBC # BLD: 11 /100 WBCS — HIGH (ref 0–0)
NRBC # BLD: SIGNIFICANT CHANGE UP /100 WBCS (ref 0–0)
PLAT MORPH BLD: ABNORMAL
PLATELET # BLD AUTO: 786 K/UL — HIGH (ref 150–400)
POIKILOCYTOSIS BLD QL AUTO: SIGNIFICANT CHANGE UP
POLYCHROMASIA BLD QL SMEAR: SLIGHT — SIGNIFICANT CHANGE UP
RBC # BLD: 4.71 M/UL — SIGNIFICANT CHANGE UP (ref 4.2–5.8)
RBC # FLD: 29.2 % — HIGH (ref 10.3–14.5)
RBC BLD AUTO: ABNORMAL
SCHISTOCYTES BLD QL AUTO: SIGNIFICANT CHANGE UP
TARGETS BLD QL SMEAR: SIGNIFICANT CHANGE UP
WBC # BLD: 8.01 K/UL — SIGNIFICANT CHANGE UP (ref 3.8–10.5)
WBC # FLD AUTO: 8.01 K/UL — SIGNIFICANT CHANGE UP (ref 3.8–10.5)

## 2024-09-11 ENCOUNTER — APPOINTMENT (OUTPATIENT)
Dept: OPHTHALMOLOGY | Facility: CLINIC | Age: 74
End: 2024-09-11
Payer: MEDICARE

## 2024-09-11 ENCOUNTER — APPOINTMENT (OUTPATIENT)
Dept: HEMATOLOGY ONCOLOGY | Facility: CLINIC | Age: 74
End: 2024-09-11

## 2024-09-11 ENCOUNTER — NON-APPOINTMENT (OUTPATIENT)
Age: 74
End: 2024-09-11

## 2024-09-11 ENCOUNTER — RESULT REVIEW (OUTPATIENT)
Age: 74
End: 2024-09-11

## 2024-09-11 LAB
ACANTHOCYTES BLD QL SMEAR: SLIGHT — SIGNIFICANT CHANGE UP
ANISOCYTOSIS BLD QL: SIGNIFICANT CHANGE UP
BASOPHILS # BLD AUTO: 0 K/UL — SIGNIFICANT CHANGE UP (ref 0–0.2)
BASOPHILS NFR BLD AUTO: 0 % — SIGNIFICANT CHANGE UP (ref 0–2)
BLASTS # FLD: 2 % — HIGH (ref 0–0)
DACRYOCYTES BLD QL SMEAR: SLIGHT — SIGNIFICANT CHANGE UP
ELLIPTOCYTES BLD QL SMEAR: SLIGHT — SIGNIFICANT CHANGE UP
EOSINOPHIL # BLD AUTO: 0.13 K/UL — SIGNIFICANT CHANGE UP (ref 0–0.5)
EOSINOPHIL NFR BLD AUTO: 2 % — SIGNIFICANT CHANGE UP (ref 0–6)
GIANT PLATELETS BLD QL SMEAR: PRESENT — SIGNIFICANT CHANGE UP
HCT VFR BLD CALC: 35.4 % — LOW (ref 39–50)
HGB BLD-MCNC: 11.5 G/DL — LOW (ref 13–17)
HYPOCHROMIA BLD QL: SLIGHT — SIGNIFICANT CHANGE UP
LG PLATELETS BLD QL AUTO: SIGNIFICANT CHANGE UP
LYMPHOCYTES # BLD AUTO: 1.58 K/UL — SIGNIFICANT CHANGE UP (ref 1–3.3)
LYMPHOCYTES # BLD AUTO: 25 % — SIGNIFICANT CHANGE UP (ref 13–44)
MCHC RBC-ENTMCNC: 26 PG — LOW (ref 27–34)
MCHC RBC-ENTMCNC: 32.5 G/DL — SIGNIFICANT CHANGE UP (ref 32–36)
MCV RBC AUTO: 79.9 FL — LOW (ref 80–100)
MONOCYTES # BLD AUTO: 0.19 K/UL — SIGNIFICANT CHANGE UP (ref 0–0.9)
MONOCYTES NFR BLD AUTO: 3 % — SIGNIFICANT CHANGE UP (ref 2–14)
MYELOCYTES NFR BLD: 1 % — HIGH (ref 0–0)
NEUTROPHILS # BLD AUTO: 4.24 K/UL — SIGNIFICANT CHANGE UP (ref 1.8–7.4)
NEUTROPHILS NFR BLD AUTO: 67 % — SIGNIFICANT CHANGE UP (ref 43–77)
NRBC # BLD: 5 /100 WBCS — HIGH (ref 0–0)
NRBC # BLD: SIGNIFICANT CHANGE UP /100 WBCS (ref 0–0)
PLAT MORPH BLD: ABNORMAL
PLATELET # BLD AUTO: 343 K/UL — SIGNIFICANT CHANGE UP (ref 150–400)
POIKILOCYTOSIS BLD QL AUTO: SIGNIFICANT CHANGE UP
POLYCHROMASIA BLD QL SMEAR: SLIGHT — SIGNIFICANT CHANGE UP
RBC # BLD: 4.43 M/UL — SIGNIFICANT CHANGE UP (ref 4.2–5.8)
RBC # FLD: 29.3 % — HIGH (ref 10.3–14.5)
RBC BLD AUTO: ABNORMAL
SCHISTOCYTES BLD QL AUTO: SIGNIFICANT CHANGE UP
TARGETS BLD QL SMEAR: SIGNIFICANT CHANGE UP
WBC # BLD: 6.33 K/UL — SIGNIFICANT CHANGE UP (ref 3.8–10.5)
WBC # FLD AUTO: 6.33 K/UL — SIGNIFICANT CHANGE UP (ref 3.8–10.5)

## 2024-09-11 PROCEDURE — 92083 EXTENDED VISUAL FIELD XM: CPT

## 2024-09-11 PROCEDURE — 92012 INTRM OPH EXAM EST PATIENT: CPT

## 2024-09-11 PROCEDURE — 92133 CPTRZD OPH DX IMG PST SGM ON: CPT

## 2024-09-12 ENCOUNTER — OUTPATIENT (OUTPATIENT)
Dept: OUTPATIENT SERVICES | Facility: HOSPITAL | Age: 74
LOS: 1 days | Discharge: HOME | End: 2024-09-12

## 2024-09-12 DIAGNOSIS — D69.6 THROMBOCYTOPENIA, UNSPECIFIED: ICD-10-CM

## 2024-09-16 ENCOUNTER — APPOINTMENT (OUTPATIENT)
Dept: INTERNAL MEDICINE | Facility: CLINIC | Age: 74
End: 2024-09-16
Payer: MEDICARE

## 2024-09-16 ENCOUNTER — OUTPATIENT (OUTPATIENT)
Dept: OUTPATIENT SERVICES | Facility: HOSPITAL | Age: 74
LOS: 1 days | End: 2024-09-16
Payer: MEDICARE

## 2024-09-16 ENCOUNTER — APPOINTMENT (OUTPATIENT)
Dept: HEMATOLOGY ONCOLOGY | Facility: CLINIC | Age: 74
End: 2024-09-16

## 2024-09-16 VITALS
HEART RATE: 85 BPM | BODY MASS INDEX: 22.07 KG/M2 | SYSTOLIC BLOOD PRESSURE: 118 MMHG | DIASTOLIC BLOOD PRESSURE: 64 MMHG | OXYGEN SATURATION: 97 % | HEIGHT: 68.74 IN | WEIGHT: 149 LBS

## 2024-09-16 DIAGNOSIS — I50.9 HEART FAILURE, UNSPECIFIED: ICD-10-CM

## 2024-09-16 DIAGNOSIS — I10 ESSENTIAL (PRIMARY) HYPERTENSION: ICD-10-CM

## 2024-09-16 PROCEDURE — 90662 IIV NO PRSV INCREASED AG IM: CPT

## 2024-09-16 PROCEDURE — G0008: CPT

## 2024-09-16 PROCEDURE — G2211 COMPLEX E/M VISIT ADD ON: CPT

## 2024-09-16 PROCEDURE — G0463: CPT | Mod: 25

## 2024-09-16 PROCEDURE — 99213 OFFICE O/P EST LOW 20 MIN: CPT | Mod: GE

## 2024-09-16 RX ORDER — FUROSEMIDE 40 MG/1
40 TABLET ORAL TWICE DAILY
Qty: 60 | Refills: 0 | Status: ACTIVE | COMMUNITY
Start: 2024-09-16 | End: 1900-01-01

## 2024-09-18 ENCOUNTER — APPOINTMENT (OUTPATIENT)
Dept: INFUSION THERAPY | Facility: HOSPITAL | Age: 74
End: 2024-09-18

## 2024-09-18 ENCOUNTER — APPOINTMENT (OUTPATIENT)
Dept: HEMATOLOGY ONCOLOGY | Facility: CLINIC | Age: 74
End: 2024-09-18

## 2024-09-18 ENCOUNTER — RESULT REVIEW (OUTPATIENT)
Age: 74
End: 2024-09-18

## 2024-09-18 LAB
ANISOCYTOSIS BLD QL: SIGNIFICANT CHANGE UP
BASOPHILS # BLD AUTO: 0 K/UL — SIGNIFICANT CHANGE UP (ref 0–0.2)
BASOPHILS NFR BLD AUTO: 0 % — SIGNIFICANT CHANGE UP (ref 0–2)
BLASTS # FLD: 3 % — HIGH (ref 0–0)
DACRYOCYTES BLD QL SMEAR: SLIGHT — SIGNIFICANT CHANGE UP
ELLIPTOCYTES BLD QL SMEAR: SLIGHT — SIGNIFICANT CHANGE UP
EOSINOPHIL # BLD AUTO: 0.2 K/UL — SIGNIFICANT CHANGE UP (ref 0–0.5)
EOSINOPHIL NFR BLD AUTO: 3 % — SIGNIFICANT CHANGE UP (ref 0–6)
GIANT PLATELETS BLD QL SMEAR: PRESENT — SIGNIFICANT CHANGE UP
HCT VFR BLD CALC: 34.8 % — LOW (ref 39–50)
HGB BLD-MCNC: 11.4 G/DL — LOW (ref 13–17)
HYPOCHROMIA BLD QL: SLIGHT — SIGNIFICANT CHANGE UP
LG PLATELETS BLD QL AUTO: SIGNIFICANT CHANGE UP
LYMPHOCYTES # BLD AUTO: 0.92 K/UL — LOW (ref 1–3.3)
LYMPHOCYTES # BLD AUTO: 14 % — SIGNIFICANT CHANGE UP (ref 13–44)
MCHC RBC-ENTMCNC: 26.3 PG — LOW (ref 27–34)
MCHC RBC-ENTMCNC: 32.8 G/DL — SIGNIFICANT CHANGE UP (ref 32–36)
MCV RBC AUTO: 80.4 FL — SIGNIFICANT CHANGE UP (ref 80–100)
METAMYELOCYTES # FLD: 6 % — HIGH (ref 0–0)
MICROCYTES BLD QL: SLIGHT — SIGNIFICANT CHANGE UP
MONOCYTES # BLD AUTO: 0.2 K/UL — SIGNIFICANT CHANGE UP (ref 0–0.9)
MONOCYTES NFR BLD AUTO: 3 % — SIGNIFICANT CHANGE UP (ref 2–14)
MYELOCYTES NFR BLD: 6 % — HIGH (ref 0–0)
NEUTROPHILS # BLD AUTO: 4.26 K/UL — SIGNIFICANT CHANGE UP (ref 1.8–7.4)
NEUTROPHILS NFR BLD AUTO: 65 % — SIGNIFICANT CHANGE UP (ref 43–77)
NRBC # BLD: 5 /100 WBCS — HIGH (ref 0–0)
NRBC # BLD: SIGNIFICANT CHANGE UP /100 WBCS (ref 0–0)
PLAT MORPH BLD: ABNORMAL
PLATELET # BLD AUTO: 389 K/UL — SIGNIFICANT CHANGE UP (ref 150–400)
POIKILOCYTOSIS BLD QL AUTO: SIGNIFICANT CHANGE UP
POLYCHROMASIA BLD QL SMEAR: SLIGHT — SIGNIFICANT CHANGE UP
RBC # BLD: 4.33 M/UL — SIGNIFICANT CHANGE UP (ref 4.2–5.8)
RBC # FLD: 29.3 % — HIGH (ref 10.3–14.5)
RBC BLD AUTO: ABNORMAL
SCHISTOCYTES BLD QL AUTO: SIGNIFICANT CHANGE UP
TARGETS BLD QL SMEAR: SLIGHT — SIGNIFICANT CHANGE UP
WBC # BLD: 6.56 K/UL — SIGNIFICANT CHANGE UP (ref 3.8–10.5)
WBC # FLD AUTO: 6.56 K/UL — SIGNIFICANT CHANGE UP (ref 3.8–10.5)

## 2024-09-18 RX ORDER — METOPROLOL SUCCINATE 25 MG/1
25 TABLET, EXTENDED RELEASE ORAL
Qty: 90 | Refills: 3 | Status: ACTIVE | COMMUNITY
Start: 2024-09-18 | End: 1900-01-01

## 2024-09-18 NOTE — HISTORY OF PRESENT ILLNESS
[Post-hospitalization from ___ Hospital] : Post-hospitalization from [unfilled] Hospital [Admitted on: ___] : The patient was admitted on [unfilled] [Discharged on ___] : discharged on [unfilled] [Discharge Med List] : discharge medication list [Med Reconciliation] : medication reconciliation has been completed [FreeTextEntry2] : 74M T2DM, MDS/CML, PE on eliquis 5/24 p/f HFU.  Patient admitted for chest pressure/SOB, b/l edema - found to have acute decompensated HF, EF 20% from 49% in May. LHC revealing nonobstructive CAD. Was discharged on following meds: Lasix 40 BID, entresto BID, metop 25qd. Dry weight 69.7 on discharge. Today 67.5 kg.  Patient states he feels 80-90% better, when lifting things still SOB, but can lay flat in bed and wear his shoes normally again. He has appointments scheduled w/ heme/onc - holding ojjara, lusatercept, c/w imatinib, anagrelide.

## 2024-09-18 NOTE — PHYSICAL EXAM
[Normal] : normal rate, regular rhythm, normal S1 and S2 and no murmur heard [de-identified] : 1+ ptting edema LE  [de-identified] : hepatojugular reflex present

## 2024-09-18 NOTE — PHYSICAL EXAM
[Normal] : normal rate, regular rhythm, normal S1 and S2 and no murmur heard [de-identified] : 1+ ptting edema LE  [de-identified] : hepatojugular reflex present

## 2024-09-18 NOTE — ASSESSMENT
[FreeTextEntry1] : 74M T2DM, MDS/CML, PE on eliquis 5/24 p/f HFU for HF exacerbation   #HFrEF c/w GDMT: entresto BID, metop 25qd -c/w lasix 40 BID -cards ref given for f/u w/ Dr. Jm Castaneda  -med renewals sent   #PE -c/w eliquis 5 BID  #HCM -flu shot given today   RTC in 2 mos for CPE Case dw Dr Beckford

## 2024-09-23 DIAGNOSIS — Z23 ENCOUNTER FOR IMMUNIZATION: ICD-10-CM

## 2024-09-23 DIAGNOSIS — I50.9 HEART FAILURE, UNSPECIFIED: ICD-10-CM

## 2024-09-25 ENCOUNTER — RESULT REVIEW (OUTPATIENT)
Age: 74
End: 2024-09-25

## 2024-09-25 ENCOUNTER — APPOINTMENT (OUTPATIENT)
Dept: HEMATOLOGY ONCOLOGY | Facility: CLINIC | Age: 74
End: 2024-09-25

## 2024-09-25 LAB
ANISOCYTOSIS BLD QL: SIGNIFICANT CHANGE UP
BASOPHILS # BLD AUTO: 0.07 K/UL — SIGNIFICANT CHANGE UP (ref 0–0.2)
BASOPHILS NFR BLD AUTO: 1 % — SIGNIFICANT CHANGE UP (ref 0–2)
BLASTS # FLD: 1 % — HIGH (ref 0–0)
DACRYOCYTES BLD QL SMEAR: SLIGHT — SIGNIFICANT CHANGE UP
ELLIPTOCYTES BLD QL SMEAR: SIGNIFICANT CHANGE UP
EOSINOPHIL # BLD AUTO: 0.07 K/UL — SIGNIFICANT CHANGE UP (ref 0–0.5)
EOSINOPHIL NFR BLD AUTO: 1 % — SIGNIFICANT CHANGE UP (ref 0–6)
HCT VFR BLD CALC: 33.9 % — LOW (ref 39–50)
HGB BLD-MCNC: 11 G/DL — LOW (ref 13–17)
HYPOCHROMIA BLD QL: SLIGHT — SIGNIFICANT CHANGE UP
LG PLATELETS BLD QL AUTO: SIGNIFICANT CHANGE UP
LYMPHOCYTES # BLD AUTO: 1.78 K/UL — SIGNIFICANT CHANGE UP (ref 1–3.3)
LYMPHOCYTES # BLD AUTO: 25 % — SIGNIFICANT CHANGE UP (ref 13–44)
MCHC RBC-ENTMCNC: 25.8 PG — LOW (ref 27–34)
MCHC RBC-ENTMCNC: 32.4 G/DL — SIGNIFICANT CHANGE UP (ref 32–36)
MCV RBC AUTO: 79.6 FL — LOW (ref 80–100)
METAMYELOCYTES # FLD: 2 % — HIGH (ref 0–0)
MICROCYTES BLD QL: SLIGHT — SIGNIFICANT CHANGE UP
MONOCYTES # BLD AUTO: 0.21 K/UL — SIGNIFICANT CHANGE UP (ref 0–0.9)
MONOCYTES NFR BLD AUTO: 3 % — SIGNIFICANT CHANGE UP (ref 2–14)
MYELOCYTES NFR BLD: 6 % — HIGH (ref 0–0)
NEUTROPHILS # BLD AUTO: 4.34 K/UL — SIGNIFICANT CHANGE UP (ref 1.8–7.4)
NEUTROPHILS NFR BLD AUTO: 60 % — SIGNIFICANT CHANGE UP (ref 43–77)
NEUTS BAND # BLD: 1 % — SIGNIFICANT CHANGE UP (ref 0–8)
NRBC # BLD: 7 /100 WBCS — HIGH (ref 0–0)
NRBC # BLD: SIGNIFICANT CHANGE UP /100 WBCS (ref 0–0)
PLAT MORPH BLD: ABNORMAL
PLATELET # BLD AUTO: 622 K/UL — HIGH (ref 150–400)
POIKILOCYTOSIS BLD QL AUTO: SIGNIFICANT CHANGE UP
POLYCHROMASIA BLD QL SMEAR: SLIGHT — SIGNIFICANT CHANGE UP
RBC # BLD: 4.26 M/UL — SIGNIFICANT CHANGE UP (ref 4.2–5.8)
RBC # FLD: 29.2 % — HIGH (ref 10.3–14.5)
RBC BLD AUTO: ABNORMAL
SCHISTOCYTES BLD QL AUTO: SIGNIFICANT CHANGE UP
TARGETS BLD QL SMEAR: SIGNIFICANT CHANGE UP
WBC # BLD: 7.11 K/UL — SIGNIFICANT CHANGE UP (ref 3.8–10.5)
WBC # FLD AUTO: 7.11 K/UL — SIGNIFICANT CHANGE UP (ref 3.8–10.5)

## 2024-09-30 ENCOUNTER — APPOINTMENT (OUTPATIENT)
Dept: HEMATOLOGY ONCOLOGY | Facility: CLINIC | Age: 74
End: 2024-09-30

## 2024-09-30 ENCOUNTER — RESULT REVIEW (OUTPATIENT)
Age: 74
End: 2024-09-30

## 2024-09-30 ENCOUNTER — APPOINTMENT (OUTPATIENT)
Dept: ULTRASOUND IMAGING | Facility: IMAGING CENTER | Age: 74
End: 2024-09-30
Payer: MEDICARE

## 2024-09-30 ENCOUNTER — OUTPATIENT (OUTPATIENT)
Dept: OUTPATIENT SERVICES | Facility: HOSPITAL | Age: 74
LOS: 1 days | End: 2024-09-30
Payer: MEDICARE

## 2024-09-30 DIAGNOSIS — D75.81 MYELOFIBROSIS: ICD-10-CM

## 2024-09-30 LAB
ANISOCYTOSIS BLD QL: SIGNIFICANT CHANGE UP
BASO STIPL BLD QL SMEAR: PRESENT — SIGNIFICANT CHANGE UP
BASOPHILS # BLD AUTO: 0 K/UL — SIGNIFICANT CHANGE UP (ref 0–0.2)
BASOPHILS NFR BLD AUTO: 0 % — SIGNIFICANT CHANGE UP (ref 0–2)
BLASTS # FLD: 5 % — HIGH (ref 0–0)
DACRYOCYTES BLD QL SMEAR: SLIGHT — SIGNIFICANT CHANGE UP
ELLIPTOCYTES BLD QL SMEAR: SLIGHT — SIGNIFICANT CHANGE UP
EOSINOPHIL # BLD AUTO: 0.21 K/UL — SIGNIFICANT CHANGE UP (ref 0–0.5)
EOSINOPHIL NFR BLD AUTO: 4 % — SIGNIFICANT CHANGE UP (ref 0–6)
HCT VFR BLD CALC: 31.2 % — LOW (ref 39–50)
HGB BLD-MCNC: 10 G/DL — LOW (ref 13–17)
HYPOCHROMIA BLD QL: SLIGHT — SIGNIFICANT CHANGE UP
LG PLATELETS BLD QL AUTO: SIGNIFICANT CHANGE UP
LYMPHOCYTES # BLD AUTO: 0.73 K/UL — LOW (ref 1–3.3)
LYMPHOCYTES # BLD AUTO: 14 % — SIGNIFICANT CHANGE UP (ref 13–44)
MCHC RBC-ENTMCNC: 26.2 PG — LOW (ref 27–34)
MCHC RBC-ENTMCNC: 32.1 G/DL — SIGNIFICANT CHANGE UP (ref 32–36)
MCV RBC AUTO: 81.9 FL — SIGNIFICANT CHANGE UP (ref 80–100)
METAMYELOCYTES # FLD: 3 % — HIGH (ref 0–0)
MONOCYTES # BLD AUTO: 0.21 K/UL — SIGNIFICANT CHANGE UP (ref 0–0.9)
MONOCYTES NFR BLD AUTO: 4 % — SIGNIFICANT CHANGE UP (ref 2–14)
MYELOCYTES NFR BLD: 12 % — HIGH (ref 0–0)
NEUTROPHILS # BLD AUTO: 2.92 K/UL — SIGNIFICANT CHANGE UP (ref 1.8–7.4)
NEUTROPHILS NFR BLD AUTO: 56 % — SIGNIFICANT CHANGE UP (ref 43–77)
NRBC # BLD: 8 /100 WBCS — HIGH (ref 0–0)
NRBC # BLD: SIGNIFICANT CHANGE UP /100 WBCS (ref 0–0)
PLAT MORPH BLD: ABNORMAL
PLATELET # BLD AUTO: 610 K/UL — HIGH (ref 150–400)
POIKILOCYTOSIS BLD QL AUTO: SIGNIFICANT CHANGE UP
POLYCHROMASIA BLD QL SMEAR: SLIGHT — SIGNIFICANT CHANGE UP
PROMYELOCYTES # FLD: 2 % — HIGH (ref 0–0)
RBC # BLD: 3.81 M/UL — LOW (ref 4.2–5.8)
RBC # FLD: 30.2 % — HIGH (ref 10.3–14.5)
RBC BLD AUTO: ABNORMAL
SCHISTOCYTES BLD QL AUTO: SIGNIFICANT CHANGE UP
TARGETS BLD QL SMEAR: SIGNIFICANT CHANGE UP
WBC # BLD: 5.22 K/UL — SIGNIFICANT CHANGE UP (ref 3.8–10.5)
WBC # FLD AUTO: 5.22 K/UL — SIGNIFICANT CHANGE UP (ref 3.8–10.5)

## 2024-09-30 PROCEDURE — 76700 US EXAM ABDOM COMPLETE: CPT | Mod: 26

## 2024-09-30 PROCEDURE — 76700 US EXAM ABDOM COMPLETE: CPT

## 2024-10-02 ENCOUNTER — APPOINTMENT (OUTPATIENT)
Dept: HEMATOLOGY ONCOLOGY | Facility: CLINIC | Age: 74
End: 2024-10-02

## 2024-10-07 ENCOUNTER — APPOINTMENT (OUTPATIENT)
Dept: HEMATOLOGY ONCOLOGY | Facility: CLINIC | Age: 74
End: 2024-10-07

## 2024-10-09 ENCOUNTER — RESULT REVIEW (OUTPATIENT)
Age: 74
End: 2024-10-09

## 2024-10-09 ENCOUNTER — APPOINTMENT (OUTPATIENT)
Dept: INFUSION THERAPY | Facility: HOSPITAL | Age: 74
End: 2024-10-09

## 2024-10-09 ENCOUNTER — APPOINTMENT (OUTPATIENT)
Dept: HEMATOLOGY ONCOLOGY | Facility: CLINIC | Age: 74
End: 2024-10-09

## 2024-10-09 DIAGNOSIS — D75.81 MYELOFIBROSIS: ICD-10-CM

## 2024-10-09 DIAGNOSIS — C94.6 MYELODYSPLASTIC DISEASE, NOT CLASSIFIED: ICD-10-CM

## 2024-10-09 LAB
ANISOCYTOSIS BLD QL: SIGNIFICANT CHANGE UP
BASO STIPL BLD QL SMEAR: PRESENT — SIGNIFICANT CHANGE UP
BASOPHILS # BLD AUTO: 0.07 K/UL — SIGNIFICANT CHANGE UP (ref 0–0.2)
BASOPHILS NFR BLD AUTO: 1 % — SIGNIFICANT CHANGE UP (ref 0–2)
BLASTS # FLD: 7 % — HIGH (ref 0–0)
DACRYOCYTES BLD QL SMEAR: SIGNIFICANT CHANGE UP
ELLIPTOCYTES BLD QL SMEAR: SIGNIFICANT CHANGE UP
EOSINOPHIL # BLD AUTO: 0.28 K/UL — SIGNIFICANT CHANGE UP (ref 0–0.5)
EOSINOPHIL NFR BLD AUTO: 4 % — SIGNIFICANT CHANGE UP (ref 0–6)
HCT VFR BLD CALC: 30.2 % — LOW (ref 39–50)
HGB BLD-MCNC: 9.6 G/DL — LOW (ref 13–17)
LG PLATELETS BLD QL AUTO: SIGNIFICANT CHANGE UP
LYMPHOCYTES # BLD AUTO: 1.35 K/UL — SIGNIFICANT CHANGE UP (ref 1–3.3)
LYMPHOCYTES # BLD AUTO: 19 % — SIGNIFICANT CHANGE UP (ref 13–44)
MCHC RBC-ENTMCNC: 26.1 PG — LOW (ref 27–34)
MCHC RBC-ENTMCNC: 31.8 G/DL — LOW (ref 32–36)
MCV RBC AUTO: 82.1 FL — SIGNIFICANT CHANGE UP (ref 80–100)
METAMYELOCYTES # FLD: 2 % — HIGH (ref 0–0)
MONOCYTES # BLD AUTO: 0.28 K/UL — SIGNIFICANT CHANGE UP (ref 0–0.9)
MONOCYTES NFR BLD AUTO: 4 % — SIGNIFICANT CHANGE UP (ref 2–14)
MYELOCYTES NFR BLD: 9 % — HIGH (ref 0–0)
NEUTROPHILS # BLD AUTO: 3.83 K/UL — SIGNIFICANT CHANGE UP (ref 1.8–7.4)
NEUTROPHILS NFR BLD AUTO: 54 % — SIGNIFICANT CHANGE UP (ref 43–77)
NRBC # BLD: 1 /100 WBCS — HIGH (ref 0–0)
NRBC # BLD: SIGNIFICANT CHANGE UP /100 WBCS (ref 0–0)
PLAT MORPH BLD: ABNORMAL
PLATELET # BLD AUTO: 623 K/UL — HIGH (ref 150–400)
POIKILOCYTOSIS BLD QL AUTO: SIGNIFICANT CHANGE UP
RBC # BLD: 3.68 M/UL — LOW (ref 4.2–5.8)
RBC # FLD: 30.2 % — HIGH (ref 10.3–14.5)
RBC BLD AUTO: ABNORMAL
SCHISTOCYTES BLD QL AUTO: SLIGHT — SIGNIFICANT CHANGE UP
TARGETS BLD QL SMEAR: SIGNIFICANT CHANGE UP
WBC # BLD: 7.1 K/UL — SIGNIFICANT CHANGE UP (ref 3.8–10.5)
WBC # FLD AUTO: 7.1 K/UL — SIGNIFICANT CHANGE UP (ref 3.8–10.5)

## 2024-10-10 DIAGNOSIS — C92.10 CHRONIC MYELOID LEUKEMIA, BCR/ABL-POSITIVE, NOT HAVING ACHIEVED REMISSION: ICD-10-CM

## 2024-10-11 LAB
ALBUMIN SERPL ELPH-MCNC: 4.5 G/DL
ALP BLD-CCNC: 139 U/L
ALT SERPL-CCNC: 17 U/L
ANION GAP SERPL CALC-SCNC: 11 MMOL/L
AST SERPL-CCNC: 16 U/L
BILIRUB SERPL-MCNC: 0.6 MG/DL
BUN SERPL-MCNC: 19 MG/DL
CALCIUM SERPL-MCNC: 8.5 MG/DL
CHLORIDE SERPL-SCNC: 106 MMOL/L
CO2 SERPL-SCNC: 25 MMOL/L
CREAT SERPL-MCNC: 1.04 MG/DL
EGFR: 75 ML/MIN/1.73M2
GLUCOSE SERPL-MCNC: 149 MG/DL
LDH SERPL-CCNC: 731 U/L
POTASSIUM SERPL-SCNC: 5.5 MMOL/L
PROT SERPL-MCNC: 7 G/DL
SODIUM SERPL-SCNC: 143 MMOL/L
URATE SERPL-MCNC: 8.6 MG/DL

## 2024-10-14 ENCOUNTER — APPOINTMENT (OUTPATIENT)
Dept: HEMATOLOGY ONCOLOGY | Facility: CLINIC | Age: 74
End: 2024-10-14

## 2024-10-16 ENCOUNTER — RESULT REVIEW (OUTPATIENT)
Age: 74
End: 2024-10-16

## 2024-10-16 ENCOUNTER — APPOINTMENT (OUTPATIENT)
Dept: HEMATOLOGY ONCOLOGY | Facility: CLINIC | Age: 74
End: 2024-10-16

## 2024-10-16 LAB
ACANTHOCYTES BLD QL SMEAR: SLIGHT — SIGNIFICANT CHANGE UP
ANISOCYTOSIS BLD QL: SIGNIFICANT CHANGE UP
BASO STIPL BLD QL SMEAR: PRESENT — SIGNIFICANT CHANGE UP
BASOPHILS # BLD AUTO: 0.15 K/UL — SIGNIFICANT CHANGE UP (ref 0–0.2)
BASOPHILS NFR BLD AUTO: 2 % — SIGNIFICANT CHANGE UP (ref 0–2)
BLASTS # FLD: 2 % — HIGH (ref 0–0)
DACRYOCYTES BLD QL SMEAR: SIGNIFICANT CHANGE UP
ELLIPTOCYTES BLD QL SMEAR: SIGNIFICANT CHANGE UP
EOSINOPHIL # BLD AUTO: 0.3 K/UL — SIGNIFICANT CHANGE UP (ref 0–0.5)
EOSINOPHIL NFR BLD AUTO: 4 % — SIGNIFICANT CHANGE UP (ref 0–6)
GIANT PLATELETS BLD QL SMEAR: PRESENT — SIGNIFICANT CHANGE UP
HCT VFR BLD CALC: 30.5 % — LOW (ref 39–50)
HGB BLD-MCNC: 10.1 G/DL — LOW (ref 13–17)
HYPOCHROMIA BLD QL: SLIGHT — SIGNIFICANT CHANGE UP
LG PLATELETS BLD QL AUTO: SIGNIFICANT CHANGE UP
LYMPHOCYTES # BLD AUTO: 1.52 K/UL — SIGNIFICANT CHANGE UP (ref 1–3.3)
LYMPHOCYTES # BLD AUTO: 20 % — SIGNIFICANT CHANGE UP (ref 13–44)
MCHC RBC-ENTMCNC: 26.1 PG — LOW (ref 27–34)
MCHC RBC-ENTMCNC: 33.1 G/DL — SIGNIFICANT CHANGE UP (ref 32–36)
MCV RBC AUTO: 78.8 FL — LOW (ref 80–100)
METAMYELOCYTES # FLD: 3 % — HIGH (ref 0–0)
MICROCYTES BLD QL: SLIGHT — SIGNIFICANT CHANGE UP
MONOCYTES # BLD AUTO: 0.23 K/UL — SIGNIFICANT CHANGE UP (ref 0–0.9)
MONOCYTES NFR BLD AUTO: 3 % — SIGNIFICANT CHANGE UP (ref 2–14)
MYELOCYTES NFR BLD: 4 % — HIGH (ref 0–0)
NEUTROPHILS # BLD AUTO: 4.71 K/UL — SIGNIFICANT CHANGE UP (ref 1.8–7.4)
NEUTROPHILS NFR BLD AUTO: 62 % — SIGNIFICANT CHANGE UP (ref 43–77)
NRBC # BLD: 33 /100 WBCS — HIGH (ref 0–0)
NRBC # BLD: SIGNIFICANT CHANGE UP /100 WBCS (ref 0–0)
PLAT MORPH BLD: ABNORMAL
PLATELET # BLD AUTO: 512 K/UL — HIGH (ref 150–400)
POIKILOCYTOSIS BLD QL AUTO: SIGNIFICANT CHANGE UP
POLYCHROMASIA BLD QL SMEAR: SIGNIFICANT CHANGE UP
RBC # BLD: 3.87 M/UL — LOW (ref 4.2–5.8)
RBC # FLD: 30.5 % — HIGH (ref 10.3–14.5)
RBC BLD AUTO: ABNORMAL
SCHISTOCYTES BLD QL AUTO: SLIGHT — SIGNIFICANT CHANGE UP
TARGETS BLD QL SMEAR: SLIGHT — SIGNIFICANT CHANGE UP
WBC # BLD: 7.59 K/UL — SIGNIFICANT CHANGE UP (ref 3.8–10.5)
WBC # FLD AUTO: 7.59 K/UL — SIGNIFICANT CHANGE UP (ref 3.8–10.5)

## 2024-10-21 ENCOUNTER — APPOINTMENT (OUTPATIENT)
Dept: INFUSION THERAPY | Facility: HOSPITAL | Age: 74
End: 2024-10-21

## 2024-10-21 ENCOUNTER — APPOINTMENT (OUTPATIENT)
Dept: HEMATOLOGY ONCOLOGY | Facility: CLINIC | Age: 74
End: 2024-10-21

## 2024-10-23 ENCOUNTER — APPOINTMENT (OUTPATIENT)
Dept: HEMATOLOGY ONCOLOGY | Facility: CLINIC | Age: 74
End: 2024-10-23
Payer: MEDICARE

## 2024-10-23 ENCOUNTER — RESULT REVIEW (OUTPATIENT)
Age: 74
End: 2024-10-23

## 2024-10-23 ENCOUNTER — LABORATORY RESULT (OUTPATIENT)
Age: 74
End: 2024-10-23

## 2024-10-23 VITALS
RESPIRATION RATE: 16 BRPM | TEMPERATURE: 97.3 F | DIASTOLIC BLOOD PRESSURE: 78 MMHG | WEIGHT: 158.07 LBS | HEART RATE: 80 BPM | BODY MASS INDEX: 23.52 KG/M2 | SYSTOLIC BLOOD PRESSURE: 147 MMHG | OXYGEN SATURATION: 97 %

## 2024-10-23 DIAGNOSIS — C92.10 CHRONIC MYELOID LEUKEMIA, BCR/ABL-POSITIVE, NOT HAVING ACHIEVED REMISSION: ICD-10-CM

## 2024-10-23 DIAGNOSIS — C94.6 MYELODYSPLASTIC DISEASE, NOT CLASSIFIED: ICD-10-CM

## 2024-10-23 LAB
ACANTHOCYTES BLD QL SMEAR: SIGNIFICANT CHANGE UP
ANISOCYTOSIS BLD QL: SIGNIFICANT CHANGE UP
BASO STIPL BLD QL SMEAR: PRESENT — SIGNIFICANT CHANGE UP
BASOPHILS # BLD AUTO: 0 K/UL — SIGNIFICANT CHANGE UP (ref 0–0.2)
BASOPHILS NFR BLD AUTO: 0 % — SIGNIFICANT CHANGE UP (ref 0–2)
BLASTS # FLD: 2 % — HIGH (ref 0–0)
DACRYOCYTES BLD QL SMEAR: SIGNIFICANT CHANGE UP
ELLIPTOCYTES BLD QL SMEAR: SIGNIFICANT CHANGE UP
EOSINOPHIL # BLD AUTO: 0.34 K/UL — SIGNIFICANT CHANGE UP (ref 0–0.5)
EOSINOPHIL NFR BLD AUTO: 5 % — SIGNIFICANT CHANGE UP (ref 0–6)
HCT VFR BLD CALC: 36.5 % — LOW (ref 39–50)
HGB BLD-MCNC: 11.5 G/DL — LOW (ref 13–17)
HYPOCHROMIA BLD QL: SLIGHT — SIGNIFICANT CHANGE UP
LG PLATELETS BLD QL AUTO: SIGNIFICANT CHANGE UP
LYMPHOCYTES # BLD AUTO: 1.41 K/UL — SIGNIFICANT CHANGE UP (ref 1–3.3)
LYMPHOCYTES # BLD AUTO: 21 % — SIGNIFICANT CHANGE UP (ref 13–44)
MCHC RBC-ENTMCNC: 26.3 PG — LOW (ref 27–34)
MCHC RBC-ENTMCNC: 31.5 G/DL — LOW (ref 32–36)
MCV RBC AUTO: 83.3 FL — SIGNIFICANT CHANGE UP (ref 80–100)
MICROCYTES BLD QL: SLIGHT — SIGNIFICANT CHANGE UP
MONOCYTES # BLD AUTO: 0.27 K/UL — SIGNIFICANT CHANGE UP (ref 0–0.9)
MONOCYTES NFR BLD AUTO: 4 % — SIGNIFICANT CHANGE UP (ref 2–14)
MYELOCYTES NFR BLD: 4 % — HIGH (ref 0–0)
NEUTROPHILS # BLD AUTO: 4.29 K/UL — SIGNIFICANT CHANGE UP (ref 1.8–7.4)
NEUTROPHILS NFR BLD AUTO: 64 % — SIGNIFICANT CHANGE UP (ref 43–77)
NRBC # BLD: 92 /100 WBCS — HIGH (ref 0–0)
NRBC # BLD: SIGNIFICANT CHANGE UP /100 WBCS (ref 0–0)
PLAT MORPH BLD: ABNORMAL
PLATELET # BLD AUTO: 211 K/UL — SIGNIFICANT CHANGE UP (ref 150–400)
POIKILOCYTOSIS BLD QL AUTO: SIGNIFICANT CHANGE UP
POLYCHROMASIA BLD QL SMEAR: SIGNIFICANT CHANGE UP
RBC # BLD: 4.38 M/UL — SIGNIFICANT CHANGE UP (ref 4.2–5.8)
RBC # FLD: 32.5 % — HIGH (ref 10.3–14.5)
RBC BLD AUTO: ABNORMAL
SCHISTOCYTES BLD QL AUTO: SLIGHT — SIGNIFICANT CHANGE UP
TARGETS BLD QL SMEAR: SLIGHT — SIGNIFICANT CHANGE UP
WBC # BLD: 6.71 K/UL — SIGNIFICANT CHANGE UP (ref 3.8–10.5)
WBC # FLD AUTO: 6.71 K/UL — SIGNIFICANT CHANGE UP (ref 3.8–10.5)

## 2024-10-23 PROCEDURE — 99214 OFFICE O/P EST MOD 30 MIN: CPT

## 2024-10-23 RX ORDER — LATANOPROST/PF 0.005 %
0.01 DROPS OPHTHALMIC (EYE) DAILY
Qty: 1 | Refills: 0 | Status: ACTIVE | COMMUNITY
Start: 2024-10-23 | End: 1900-01-01

## 2024-10-30 ENCOUNTER — APPOINTMENT (OUTPATIENT)
Dept: HEMATOLOGY ONCOLOGY | Facility: CLINIC | Age: 74
End: 2024-10-30

## 2024-10-30 ENCOUNTER — APPOINTMENT (OUTPATIENT)
Dept: INFUSION THERAPY | Facility: HOSPITAL | Age: 74
End: 2024-10-30

## 2024-10-30 ENCOUNTER — RESULT REVIEW (OUTPATIENT)
Age: 74
End: 2024-10-30

## 2024-10-30 LAB
ANISOCYTOSIS BLD QL: SIGNIFICANT CHANGE UP
BASO STIPL BLD QL SMEAR: PRESENT — SIGNIFICANT CHANGE UP
BASOPHILS # BLD AUTO: 0.6 K/UL — HIGH (ref 0–0.2)
BASOPHILS NFR BLD AUTO: 7 % — HIGH (ref 0–2)
BLASTS # FLD: 5 % — HIGH (ref 0–0)
DACRYOCYTES BLD QL SMEAR: SIGNIFICANT CHANGE UP
ELLIPTOCYTES BLD QL SMEAR: SLIGHT — SIGNIFICANT CHANGE UP
EOSINOPHIL # BLD AUTO: 0.34 K/UL — SIGNIFICANT CHANGE UP (ref 0–0.5)
EOSINOPHIL NFR BLD AUTO: 4 % — SIGNIFICANT CHANGE UP (ref 0–6)
GIANT PLATELETS BLD QL SMEAR: PRESENT — SIGNIFICANT CHANGE UP
HCT VFR BLD CALC: 35.8 % — LOW (ref 39–50)
HGB BLD-MCNC: 11.8 G/DL — LOW (ref 13–17)
LG PLATELETS BLD QL AUTO: SIGNIFICANT CHANGE UP
LYMPHOCYTES # BLD AUTO: 1.28 K/UL — SIGNIFICANT CHANGE UP (ref 1–3.3)
LYMPHOCYTES # BLD AUTO: 15 % — SIGNIFICANT CHANGE UP (ref 13–44)
MCHC RBC-ENTMCNC: 26.9 PG — LOW (ref 27–34)
MCHC RBC-ENTMCNC: 33 G/DL — SIGNIFICANT CHANGE UP (ref 32–36)
MCV RBC AUTO: 81.7 FL — SIGNIFICANT CHANGE UP (ref 80–100)
MICROCYTES BLD QL: SLIGHT — SIGNIFICANT CHANGE UP
MONOCYTES # BLD AUTO: 0.34 K/UL — SIGNIFICANT CHANGE UP (ref 0–0.9)
MONOCYTES NFR BLD AUTO: 4 % — SIGNIFICANT CHANGE UP (ref 2–14)
MYELOCYTES NFR BLD: 7 % — HIGH (ref 0–0)
NEUTROPHILS # BLD AUTO: 4.94 K/UL — SIGNIFICANT CHANGE UP (ref 1.8–7.4)
NEUTROPHILS NFR BLD AUTO: 58 % — SIGNIFICANT CHANGE UP (ref 43–77)
NRBC # BLD: 25 /100 WBCS — HIGH (ref 0–0)
NRBC # BLD: SIGNIFICANT CHANGE UP /100 WBCS (ref 0–0)
PLAT MORPH BLD: ABNORMAL
PLATELET # BLD AUTO: 171 K/UL — SIGNIFICANT CHANGE UP (ref 150–400)
POIKILOCYTOSIS BLD QL AUTO: SIGNIFICANT CHANGE UP
POLYCHROMASIA BLD QL SMEAR: SLIGHT — SIGNIFICANT CHANGE UP
RBC # BLD: 4.38 M/UL — SIGNIFICANT CHANGE UP (ref 4.2–5.8)
RBC # FLD: 32 % — HIGH (ref 10.3–14.5)
RBC BLD AUTO: ABNORMAL
SCHISTOCYTES BLD QL AUTO: SIGNIFICANT CHANGE UP
SMUDGE CELLS # BLD: PRESENT — SIGNIFICANT CHANGE UP
TARGETS BLD QL SMEAR: SLIGHT — SIGNIFICANT CHANGE UP
WBC # BLD: 8.51 K/UL — SIGNIFICANT CHANGE UP (ref 3.8–10.5)
WBC # FLD AUTO: 8.51 K/UL — SIGNIFICANT CHANGE UP (ref 3.8–10.5)

## 2024-11-06 ENCOUNTER — RESULT REVIEW (OUTPATIENT)
Age: 74
End: 2024-11-06

## 2024-11-06 ENCOUNTER — APPOINTMENT (OUTPATIENT)
Dept: HEMATOLOGY ONCOLOGY | Facility: CLINIC | Age: 74
End: 2024-11-06

## 2024-11-06 LAB
ANISOCYTOSIS BLD QL: SIGNIFICANT CHANGE UP
BASO STIPL BLD QL SMEAR: PRESENT — SIGNIFICANT CHANGE UP
BASOPHILS # BLD AUTO: 0.19 K/UL — SIGNIFICANT CHANGE UP (ref 0–0.2)
BASOPHILS NFR BLD AUTO: 2 % — SIGNIFICANT CHANGE UP (ref 0–2)
BLASTS # FLD: 5 % — HIGH (ref 0–0)
DACRYOCYTES BLD QL SMEAR: SIGNIFICANT CHANGE UP
ELLIPTOCYTES BLD QL SMEAR: SLIGHT — SIGNIFICANT CHANGE UP
EOSINOPHIL # BLD AUTO: 0.1 K/UL — SIGNIFICANT CHANGE UP (ref 0–0.5)
EOSINOPHIL NFR BLD AUTO: 1 % — SIGNIFICANT CHANGE UP (ref 0–6)
HCT VFR BLD CALC: 34.6 % — LOW (ref 39–50)
HGB BLD-MCNC: 11.4 G/DL — LOW (ref 13–17)
LG PLATELETS BLD QL AUTO: SIGNIFICANT CHANGE UP
LYMPHOCYTES # BLD AUTO: 2.19 K/UL — SIGNIFICANT CHANGE UP (ref 1–3.3)
LYMPHOCYTES # BLD AUTO: 23 % — SIGNIFICANT CHANGE UP (ref 13–44)
MCHC RBC-ENTMCNC: 27.3 PG — SIGNIFICANT CHANGE UP (ref 27–34)
MCHC RBC-ENTMCNC: 32.9 G/DL — SIGNIFICANT CHANGE UP (ref 32–36)
MCV RBC AUTO: 82.8 FL — SIGNIFICANT CHANGE UP (ref 80–100)
METAMYELOCYTES # FLD: 4 % — HIGH (ref 0–0)
MICROCYTES BLD QL: SLIGHT — SIGNIFICANT CHANGE UP
MONOCYTES # BLD AUTO: 0.38 K/UL — SIGNIFICANT CHANGE UP (ref 0–0.9)
MONOCYTES NFR BLD AUTO: 4 % — SIGNIFICANT CHANGE UP (ref 2–14)
MYELOCYTES NFR BLD: 10 % — HIGH (ref 0–0)
NEUTROPHILS # BLD AUTO: 4.76 K/UL — SIGNIFICANT CHANGE UP (ref 1.8–7.4)
NEUTROPHILS NFR BLD AUTO: 50 % — SIGNIFICANT CHANGE UP (ref 43–77)
NRBC # BLD: 12 /100 WBCS — HIGH (ref 0–0)
NRBC # BLD: SIGNIFICANT CHANGE UP /100 WBCS (ref 0–0)
PLAT MORPH BLD: ABNORMAL
PLATELET # BLD AUTO: 569 K/UL — HIGH (ref 150–400)
POIKILOCYTOSIS BLD QL AUTO: SIGNIFICANT CHANGE UP
POLYCHROMASIA BLD QL SMEAR: SLIGHT — SIGNIFICANT CHANGE UP
PROMYELOCYTES # FLD: 1 % — HIGH (ref 0–0)
RBC # BLD: 4.18 M/UL — LOW (ref 4.2–5.8)
RBC # FLD: 32.3 % — HIGH (ref 10.3–14.5)
RBC BLD AUTO: ABNORMAL
SCHISTOCYTES BLD QL AUTO: SIGNIFICANT CHANGE UP
TARGETS BLD QL SMEAR: SLIGHT — SIGNIFICANT CHANGE UP
WBC # BLD: 9.51 K/UL — SIGNIFICANT CHANGE UP (ref 3.8–10.5)
WBC # FLD AUTO: 9.51 K/UL — SIGNIFICANT CHANGE UP (ref 3.8–10.5)

## 2024-11-13 ENCOUNTER — APPOINTMENT (OUTPATIENT)
Dept: HEMATOLOGY ONCOLOGY | Facility: CLINIC | Age: 74
End: 2024-11-13

## 2024-11-13 ENCOUNTER — RESULT REVIEW (OUTPATIENT)
Age: 74
End: 2024-11-13

## 2024-11-13 LAB
ANISOCYTOSIS BLD QL: SIGNIFICANT CHANGE UP
BASO STIPL BLD QL SMEAR: PRESENT — SIGNIFICANT CHANGE UP
BASOPHILS # BLD AUTO: 0.2 K/UL — SIGNIFICANT CHANGE UP (ref 0–0.2)
BASOPHILS NFR BLD AUTO: 2.5 % — HIGH (ref 0–2)
BLASTS # FLD: 4.5 % — HIGH (ref 0–0)
DACRYOCYTES BLD QL SMEAR: SIGNIFICANT CHANGE UP
ELLIPTOCYTES BLD QL SMEAR: SLIGHT — SIGNIFICANT CHANGE UP
EOSINOPHIL # BLD AUTO: 0.12 K/UL — SIGNIFICANT CHANGE UP (ref 0–0.5)
EOSINOPHIL NFR BLD AUTO: 1.5 % — SIGNIFICANT CHANGE UP (ref 0–6)
GIANT PLATELETS BLD QL SMEAR: PRESENT — SIGNIFICANT CHANGE UP
HCT VFR BLD CALC: 32.3 % — LOW (ref 39–50)
HGB BLD-MCNC: 10.6 G/DL — LOW (ref 13–17)
LG PLATELETS BLD QL AUTO: SIGNIFICANT CHANGE UP
LYMPHOCYTES # BLD AUTO: 1.31 K/UL — SIGNIFICANT CHANGE UP (ref 1–3.3)
LYMPHOCYTES # BLD AUTO: 16 % — SIGNIFICANT CHANGE UP (ref 13–44)
MCHC RBC-ENTMCNC: 27.4 PG — SIGNIFICANT CHANGE UP (ref 27–34)
MCHC RBC-ENTMCNC: 32.8 G/DL — SIGNIFICANT CHANGE UP (ref 32–36)
MCV RBC AUTO: 83.5 FL — SIGNIFICANT CHANGE UP (ref 80–100)
METAMYELOCYTES # FLD: 3.5 % — HIGH (ref 0–0)
MICROCYTES BLD QL: SLIGHT — SIGNIFICANT CHANGE UP
MONOCYTES # BLD AUTO: 0.45 K/UL — SIGNIFICANT CHANGE UP (ref 0–0.9)
MONOCYTES NFR BLD AUTO: 5.5 % — SIGNIFICANT CHANGE UP (ref 2–14)
MYELOCYTES NFR BLD: 8 % — HIGH (ref 0–0)
NEUTROPHILS # BLD AUTO: 4.79 K/UL — SIGNIFICANT CHANGE UP (ref 1.8–7.4)
NEUTROPHILS NFR BLD AUTO: 58.5 % — SIGNIFICANT CHANGE UP (ref 43–77)
NRBC # BLD: 32 /100 WBCS — HIGH (ref 0–0)
NRBC # BLD: SIGNIFICANT CHANGE UP /100 WBCS (ref 0–0)
PLAT MORPH BLD: ABNORMAL
PLATELET # BLD AUTO: 769 K/UL — HIGH (ref 150–400)
POIKILOCYTOSIS BLD QL AUTO: SIGNIFICANT CHANGE UP
POLYCHROMASIA BLD QL SMEAR: SLIGHT — SIGNIFICANT CHANGE UP
RBC # BLD: 3.87 M/UL — LOW (ref 4.2–5.8)
RBC # FLD: 32.2 % — HIGH (ref 10.3–14.5)
RBC BLD AUTO: ABNORMAL
SCHISTOCYTES BLD QL AUTO: SIGNIFICANT CHANGE UP
TARGETS BLD QL SMEAR: SLIGHT — SIGNIFICANT CHANGE UP
WBC # BLD: 8.18 K/UL — SIGNIFICANT CHANGE UP (ref 3.8–10.5)
WBC # FLD AUTO: 8.18 K/UL — SIGNIFICANT CHANGE UP (ref 3.8–10.5)

## 2024-11-20 ENCOUNTER — RESULT REVIEW (OUTPATIENT)
Age: 74
End: 2024-11-20

## 2024-11-20 ENCOUNTER — APPOINTMENT (OUTPATIENT)
Dept: INFUSION THERAPY | Facility: HOSPITAL | Age: 74
End: 2024-11-20

## 2024-11-20 ENCOUNTER — APPOINTMENT (OUTPATIENT)
Dept: HEMATOLOGY ONCOLOGY | Facility: CLINIC | Age: 74
End: 2024-11-20

## 2024-11-25 ENCOUNTER — OUTPATIENT (OUTPATIENT)
Dept: OUTPATIENT SERVICES | Facility: HOSPITAL | Age: 74
LOS: 1 days | End: 2024-11-25
Payer: MEDICARE

## 2024-11-25 ENCOUNTER — APPOINTMENT (OUTPATIENT)
Dept: INTERNAL MEDICINE | Facility: CLINIC | Age: 74
End: 2024-11-25
Payer: MEDICARE

## 2024-11-25 VITALS
SYSTOLIC BLOOD PRESSURE: 142 MMHG | OXYGEN SATURATION: 98 % | WEIGHT: 163 LBS | DIASTOLIC BLOOD PRESSURE: 74 MMHG | HEART RATE: 84 BPM | BODY MASS INDEX: 24.71 KG/M2 | HEIGHT: 68 IN

## 2024-11-25 DIAGNOSIS — R60.9 EDEMA, UNSPECIFIED: ICD-10-CM

## 2024-11-25 DIAGNOSIS — I10 ESSENTIAL (PRIMARY) HYPERTENSION: ICD-10-CM

## 2024-11-25 DIAGNOSIS — E11.9 TYPE 2 DIABETES MELLITUS W/OUT COMPLICATIONS: ICD-10-CM

## 2024-11-25 DIAGNOSIS — I50.9 HEART FAILURE, UNSPECIFIED: ICD-10-CM

## 2024-11-25 DIAGNOSIS — Z00.00 ENCOUNTER FOR GENERAL ADULT MEDICAL EXAMINATION W/OUT ABNORMAL FINDINGS: ICD-10-CM

## 2024-11-25 PROCEDURE — 83036 HEMOGLOBIN GLYCOSYLATED A1C: CPT

## 2024-11-25 PROCEDURE — 99213 OFFICE O/P EST LOW 20 MIN: CPT | Mod: GE

## 2024-11-25 PROCEDURE — G0463: CPT

## 2024-11-25 PROCEDURE — 80061 LIPID PANEL: CPT

## 2024-11-25 PROCEDURE — 82043 UR ALBUMIN QUANTITATIVE: CPT

## 2024-11-26 LAB
CHOLEST SERPL-MCNC: 84 MG/DL
CREAT SPEC-SCNC: 43 MG/DL
ESTIMATED AVERAGE GLUCOSE: 137 MG/DL
HBA1C MFR BLD HPLC: 6.4 %
HDLC SERPL-MCNC: 32 MG/DL
LDLC SERPL CALC-MCNC: 37 MG/DL
MICROALBUMIN 24H UR DL<=1MG/L-MCNC: 20.2 MG/DL
MICROALBUMIN/CREAT 24H UR-RTO: 471 MG/G
NONHDLC SERPL-MCNC: 52 MG/DL
TRIGL SERPL-MCNC: 66 MG/DL

## 2024-11-27 PROBLEM — R60.9 WATER RETENTION: Status: RESOLVED | Noted: 2024-07-31 | Resolved: 2024-11-27

## 2024-12-03 DIAGNOSIS — E11.9 TYPE 2 DIABETES MELLITUS WITHOUT COMPLICATIONS: ICD-10-CM

## 2024-12-03 DIAGNOSIS — I50.9 HEART FAILURE, UNSPECIFIED: ICD-10-CM

## 2024-12-11 ENCOUNTER — NON-APPOINTMENT (OUTPATIENT)
Age: 74
End: 2024-12-11

## 2024-12-11 ENCOUNTER — APPOINTMENT (OUTPATIENT)
Dept: OPHTHALMOLOGY | Facility: CLINIC | Age: 74
End: 2024-12-11
Payer: MEDICARE

## 2024-12-11 PROCEDURE — 92012 INTRM OPH EXAM EST PATIENT: CPT

## 2024-12-18 ENCOUNTER — APPOINTMENT (OUTPATIENT)
Dept: HEMATOLOGY ONCOLOGY | Facility: CLINIC | Age: 74
End: 2024-12-18

## 2024-12-18 ENCOUNTER — APPOINTMENT (OUTPATIENT)
Dept: INFUSION THERAPY | Facility: HOSPITAL | Age: 74
End: 2024-12-18